# Patient Record
Sex: MALE | Race: WHITE | NOT HISPANIC OR LATINO
[De-identification: names, ages, dates, MRNs, and addresses within clinical notes are randomized per-mention and may not be internally consistent; named-entity substitution may affect disease eponyms.]

---

## 2017-06-13 ENCOUNTER — TRANSCRIPTION ENCOUNTER (OUTPATIENT)
Age: 62
End: 2017-06-13

## 2017-08-19 ENCOUNTER — TRANSCRIPTION ENCOUNTER (OUTPATIENT)
Age: 62
End: 2017-08-19

## 2017-12-27 ENCOUNTER — TRANSCRIPTION ENCOUNTER (OUTPATIENT)
Age: 62
End: 2017-12-27

## 2018-09-03 ENCOUNTER — TRANSCRIPTION ENCOUNTER (OUTPATIENT)
Age: 63
End: 2018-09-03

## 2018-10-21 ENCOUNTER — INPATIENT (INPATIENT)
Facility: HOSPITAL | Age: 63
LOS: 3 days | Discharge: HOME | End: 2018-10-25
Attending: FAMILY MEDICINE | Admitting: FAMILY MEDICINE
Payer: MEDICARE

## 2018-10-21 VITALS
HEIGHT: 66 IN | DIASTOLIC BLOOD PRESSURE: 78 MMHG | WEIGHT: 175.05 LBS | SYSTOLIC BLOOD PRESSURE: 160 MMHG | HEART RATE: 101 BPM | TEMPERATURE: 98 F

## 2018-10-21 DIAGNOSIS — Z90.49 ACQUIRED ABSENCE OF OTHER SPECIFIED PARTS OF DIGESTIVE TRACT: Chronic | ICD-10-CM

## 2018-10-21 DIAGNOSIS — J44.9 CHRONIC OBSTRUCTIVE PULMONARY DISEASE, UNSPECIFIED: ICD-10-CM

## 2018-10-21 DIAGNOSIS — N04.9 NEPHROTIC SYNDROME WITH UNSPECIFIED MORPHOLOGIC CHANGES: ICD-10-CM

## 2018-10-21 DIAGNOSIS — M89.9 DISORDER OF BONE, UNSPECIFIED: ICD-10-CM

## 2018-10-21 DIAGNOSIS — Z98.1 ARTHRODESIS STATUS: Chronic | ICD-10-CM

## 2018-10-21 LAB
ALBUMIN SERPL ELPH-MCNC: 1.5 G/DL — LOW (ref 3.3–5)
ALP SERPL-CCNC: 127 U/L — HIGH (ref 40–120)
ALT FLD-CCNC: 35 U/L — SIGNIFICANT CHANGE UP (ref 12–78)
ANION GAP SERPL CALC-SCNC: 8 MMOL/L — SIGNIFICANT CHANGE UP (ref 5–17)
APTT BLD: 24.7 SEC — LOW (ref 27.5–37.4)
AST SERPL-CCNC: 30 U/L — SIGNIFICANT CHANGE UP (ref 15–37)
BASOPHILS # BLD AUTO: 0.05 K/UL — SIGNIFICANT CHANGE UP (ref 0–0.2)
BASOPHILS NFR BLD AUTO: 0.2 % — SIGNIFICANT CHANGE UP (ref 0–2)
BILIRUB SERPL-MCNC: 0.3 MG/DL — SIGNIFICANT CHANGE UP (ref 0.2–1.2)
BUN SERPL-MCNC: 26 MG/DL — HIGH (ref 7–23)
CALCIUM SERPL-MCNC: 8 MG/DL — LOW (ref 8.5–10.1)
CHLORIDE SERPL-SCNC: 98 MMOL/L — SIGNIFICANT CHANGE UP (ref 96–108)
CO2 SERPL-SCNC: 33 MMOL/L — HIGH (ref 22–31)
CREAT SERPL-MCNC: 1.1 MG/DL — SIGNIFICANT CHANGE UP (ref 0.5–1.3)
EOSINOPHIL # BLD AUTO: 0.01 K/UL — SIGNIFICANT CHANGE UP (ref 0–0.5)
EOSINOPHIL NFR BLD AUTO: 0 % — SIGNIFICANT CHANGE UP (ref 0–6)
ERYTHROCYTE [SEDIMENTATION RATE] IN BLOOD: 14 MM/HR — SIGNIFICANT CHANGE UP (ref 0–20)
GLUCOSE SERPL-MCNC: 257 MG/DL — HIGH (ref 70–99)
HCT VFR BLD CALC: 42.9 % — SIGNIFICANT CHANGE UP (ref 39–50)
HGB BLD-MCNC: 14.1 G/DL — SIGNIFICANT CHANGE UP (ref 13–17)
IMM GRANULOCYTES NFR BLD AUTO: 2 % — HIGH (ref 0–1.5)
INR BLD: 0.8 RATIO — LOW (ref 0.88–1.16)
LYMPHOCYTES # BLD AUTO: 1.29 K/UL — SIGNIFICANT CHANGE UP (ref 1–3.3)
LYMPHOCYTES # BLD AUTO: 6.1 % — LOW (ref 13–44)
MCHC RBC-ENTMCNC: 29 PG — SIGNIFICANT CHANGE UP (ref 27–34)
MCHC RBC-ENTMCNC: 32.9 GM/DL — SIGNIFICANT CHANGE UP (ref 32–36)
MCV RBC AUTO: 88.1 FL — SIGNIFICANT CHANGE UP (ref 80–100)
MONOCYTES # BLD AUTO: 0.38 K/UL — SIGNIFICANT CHANGE UP (ref 0–0.9)
MONOCYTES NFR BLD AUTO: 1.8 % — LOW (ref 2–14)
NEUTROPHILS # BLD AUTO: 19 K/UL — HIGH (ref 1.8–7.4)
NEUTROPHILS NFR BLD AUTO: 89.9 % — HIGH (ref 43–77)
NRBC # BLD: 0 /100 WBCS — SIGNIFICANT CHANGE UP (ref 0–0)
PLATELET # BLD AUTO: 207 K/UL — SIGNIFICANT CHANGE UP (ref 150–400)
POTASSIUM SERPL-MCNC: 4.8 MMOL/L — SIGNIFICANT CHANGE UP (ref 3.5–5.3)
POTASSIUM SERPL-SCNC: 4.8 MMOL/L — SIGNIFICANT CHANGE UP (ref 3.5–5.3)
PROT SERPL-MCNC: 5.3 GM/DL — LOW (ref 6–8.3)
PROTHROM AB SERPL-ACNC: 8.7 SEC — LOW (ref 9.8–12.7)
RBC # BLD: 4.87 M/UL — SIGNIFICANT CHANGE UP (ref 4.2–5.8)
RBC # FLD: 16.4 % — HIGH (ref 10.3–14.5)
SODIUM SERPL-SCNC: 139 MMOL/L — SIGNIFICANT CHANGE UP (ref 135–145)
WBC # BLD: 21.15 K/UL — HIGH (ref 3.8–10.5)
WBC # FLD AUTO: 21.15 K/UL — HIGH (ref 3.8–10.5)

## 2018-10-21 PROCEDURE — 99223 1ST HOSP IP/OBS HIGH 75: CPT

## 2018-10-21 PROCEDURE — 93010 ELECTROCARDIOGRAM REPORT: CPT

## 2018-10-21 PROCEDURE — 74177 CT ABD & PELVIS W/CONTRAST: CPT | Mod: 26

## 2018-10-21 PROCEDURE — 71260 CT THORAX DX C+: CPT | Mod: 26

## 2018-10-21 PROCEDURE — 99283 EMERGENCY DEPT VISIT LOW MDM: CPT

## 2018-10-21 RX ORDER — GLUCAGON INJECTION, SOLUTION 0.5 MG/.1ML
1 INJECTION, SOLUTION SUBCUTANEOUS ONCE
Qty: 0 | Refills: 0 | Status: DISCONTINUED | OUTPATIENT
Start: 2018-10-21 | End: 2018-10-22

## 2018-10-21 RX ORDER — ATORVASTATIN CALCIUM 80 MG/1
40 TABLET, FILM COATED ORAL DAILY
Qty: 0 | Refills: 0 | Status: DISCONTINUED | OUTPATIENT
Start: 2018-10-21 | End: 2018-10-21

## 2018-10-21 RX ORDER — DEXTROSE 50 % IN WATER 50 %
12.5 SYRINGE (ML) INTRAVENOUS ONCE
Qty: 0 | Refills: 0 | Status: DISCONTINUED | OUTPATIENT
Start: 2018-10-21 | End: 2018-10-22

## 2018-10-21 RX ORDER — ZOLPIDEM TARTRATE 10 MG/1
10 TABLET ORAL AT BEDTIME
Qty: 0 | Refills: 0 | Status: DISCONTINUED | OUTPATIENT
Start: 2018-10-21 | End: 2018-10-24

## 2018-10-21 RX ORDER — DEXTROSE 50 % IN WATER 50 %
25 SYRINGE (ML) INTRAVENOUS ONCE
Qty: 0 | Refills: 0 | Status: DISCONTINUED | OUTPATIENT
Start: 2018-10-21 | End: 2018-10-22

## 2018-10-21 RX ORDER — ATORVASTATIN CALCIUM 80 MG/1
40 TABLET, FILM COATED ORAL DAILY
Qty: 0 | Refills: 0 | Status: DISCONTINUED | OUTPATIENT
Start: 2018-10-21 | End: 2018-10-25

## 2018-10-21 RX ORDER — IPRATROPIUM/ALBUTEROL SULFATE 18-103MCG
3 AEROSOL WITH ADAPTER (GRAM) INHALATION EVERY 6 HOURS
Qty: 0 | Refills: 0 | Status: DISCONTINUED | OUTPATIENT
Start: 2018-10-21 | End: 2018-10-25

## 2018-10-21 RX ORDER — LOSARTAN POTASSIUM 100 MG/1
25 TABLET, FILM COATED ORAL DAILY
Qty: 0 | Refills: 0 | Status: DISCONTINUED | OUTPATIENT
Start: 2018-10-21 | End: 2018-10-22

## 2018-10-21 RX ORDER — DEXTROSE 50 % IN WATER 50 %
15 SYRINGE (ML) INTRAVENOUS ONCE
Qty: 0 | Refills: 0 | Status: DISCONTINUED | OUTPATIENT
Start: 2018-10-21 | End: 2018-10-22

## 2018-10-21 RX ORDER — SODIUM CHLORIDE 9 MG/ML
1000 INJECTION, SOLUTION INTRAVENOUS
Qty: 0 | Refills: 0 | Status: DISCONTINUED | OUTPATIENT
Start: 2018-10-21 | End: 2018-10-22

## 2018-10-21 RX ORDER — INSULIN LISPRO 100/ML
VIAL (ML) SUBCUTANEOUS
Qty: 0 | Refills: 0 | Status: DISCONTINUED | OUTPATIENT
Start: 2018-10-21 | End: 2018-10-22

## 2018-10-21 RX ORDER — MAGNESIUM OXIDE 400 MG ORAL TABLET 241.3 MG
400 TABLET ORAL DAILY
Qty: 0 | Refills: 0 | Status: DISCONTINUED | OUTPATIENT
Start: 2018-10-21 | End: 2018-10-25

## 2018-10-21 RX ORDER — SODIUM CHLORIDE 9 MG/ML
1000 INJECTION INTRAMUSCULAR; INTRAVENOUS; SUBCUTANEOUS ONCE
Qty: 0 | Refills: 0 | Status: COMPLETED | OUTPATIENT
Start: 2018-10-21 | End: 2018-10-21

## 2018-10-21 RX ORDER — OXYCODONE HYDROCHLORIDE 5 MG/1
10 TABLET ORAL EVERY 6 HOURS
Qty: 0 | Refills: 0 | Status: DISCONTINUED | OUTPATIENT
Start: 2018-10-21 | End: 2018-10-22

## 2018-10-21 RX ORDER — TAMSULOSIN HYDROCHLORIDE 0.4 MG/1
0.4 CAPSULE ORAL AT BEDTIME
Qty: 0 | Refills: 0 | Status: DISCONTINUED | OUTPATIENT
Start: 2018-10-21 | End: 2018-10-25

## 2018-10-21 RX ORDER — MONTELUKAST 4 MG/1
10 TABLET, CHEWABLE ORAL DAILY
Qty: 0 | Refills: 0 | Status: DISCONTINUED | OUTPATIENT
Start: 2018-10-21 | End: 2018-10-25

## 2018-10-21 RX ORDER — LAMOTRIGINE 25 MG/1
100 TABLET, ORALLY DISINTEGRATING ORAL
Qty: 0 | Refills: 0 | Status: DISCONTINUED | OUTPATIENT
Start: 2018-10-21 | End: 2018-10-25

## 2018-10-21 RX ADMIN — SODIUM CHLORIDE 1000 MILLILITER(S): 9 INJECTION INTRAMUSCULAR; INTRAVENOUS; SUBCUTANEOUS at 18:51

## 2018-10-21 RX ADMIN — OXYCODONE HYDROCHLORIDE 10 MILLIGRAM(S): 5 TABLET ORAL at 18:59

## 2018-10-21 RX ADMIN — LAMOTRIGINE 100 MILLIGRAM(S): 25 TABLET, ORALLY DISINTEGRATING ORAL at 20:18

## 2018-10-21 RX ADMIN — TAMSULOSIN HYDROCHLORIDE 0.4 MILLIGRAM(S): 0.4 CAPSULE ORAL at 22:41

## 2018-10-21 RX ADMIN — OXYCODONE HYDROCHLORIDE 10 MILLIGRAM(S): 5 TABLET ORAL at 20:04

## 2018-10-21 RX ADMIN — ZOLPIDEM TARTRATE 10 MILLIGRAM(S): 10 TABLET ORAL at 23:34

## 2018-10-21 NOTE — ED ADULT NURSE NOTE - OBJECTIVE STATEMENT
patient received, was sent by pmd for a ct scan of his hips to r/o mass vs infection. patient stated had an mri done last week and his md stated unsure if it shows mass or infection to go to the er to do a ct scan with iv contrast, patient was diagnosed with nephrotic syndrome last month, wanted to get blood test done for renal function before ct scan, denies fever/chills, denies sob.

## 2018-10-21 NOTE — H&P ADULT - ASSESSMENT
63M h/o prediabetes, HTN, COPD, nephrotic syndrome on prednisone, a/w suspicious lower back lesion on MRI, r/o infectious process vs malignancy

## 2018-10-21 NOTE — H&P ADULT - PROBLEM SELECTOR PLAN 2
Nephrology consult called; d/w MD who recommended 1L NS prior to IV contrast  Continue home dose of prednisone  Monitor renal function  F/u further recs

## 2018-10-21 NOTE — CONSULT NOTE ADULT - SUBJECTIVE AND OBJECTIVE BOX
HPI:  Patient is a 63 year old male with history of prediabetes, HTN, COPD, nephrotic syndrome on prednisone, chronic back pain requiring spinal fusions x 2 who was sent in by Dr. Rajan to get CT w/ contrast to further workup a suspicious iliac lesion found on MRI. Patient reports, but states that he's had new, worsening right lower back pain that radiates down his right leg for several weeks. Takes oxy 10 at home with temporary relief of pain. Ambulates without difficulty.   Denies fever, chills, chest pain, shortness of breath, abdominal pain. (21 Oct 2018 17:04)      PAST MEDICAL & SURGICAL HISTORY:  COPD (chronic obstructive pulmonary disease)  Nephrotic syndrome  History of cholecystectomy  H/O spinal fusion      REVIEW OF SYSTEMS:    CONSTITUTIONAL: No fever, weight loss, or fatigue  EYES: No eye pain, visual disturbances, or discharge  ENMT:  No difficulty hearing, tinnitus, vertigo; No sinus or throat pain  NECK: No pain or stiffness  BREASTS: No pain, masses, or nipple discharge  RESPIRATORY: No cough, wheezing, chills or hemoptysis; No shortness of breath  CARDIOVASCULAR: No chest pain, palpitations, dizziness, or leg swelling  GASTROINTESTINAL: No abdominal or epigastric pain. No nausea, vomiting, or hematemesis; No diarrhea or constipation. No melena or hematochezia.  GENITOURINARY: No dysuria, frequency, hematuria, or incontinence  NEUROLOGICAL: No headaches, memory loss, loss of strength, numbness, or tremors  SKIN: No itching, burning, rashes, or lesions   LYMPH NODES: No enlarged glands  ENDOCRINE: No heat or cold intolerance; No hair loss  MUSCULOSKELETAL: No joint pain or swelling; No muscle, back, or extremity pain  PSYCHIATRIC: No depression, anxiety, mood swings, or difficulty sleeping  HEME/LYMPH: No easy bruising, or bleeding gums  ALLERGY AND IMMUNOLOGIC: No hives or eczema      MEDICATIONS  (STANDING):  atorvastatin 40 milliGRAM(s) Oral daily  lamoTRIgine 100 milliGRAM(s) Oral two times a day  losartan 25 milliGRAM(s) Oral daily  magnesium oxide 400 milliGRAM(s) Oral daily  montelukast 10 milliGRAM(s) Oral daily  tamsulosin 0.4 milliGRAM(s) Oral at bedtime  torsemide 20 milliGRAM(s) Oral daily    MEDICATIONS  (PRN):  oxyCODONE    IR 10 milliGRAM(s) Oral every 6 hours PRN Severe Pain (7 - 10)      Allergies    penicillin (Unknown)    Intolerances        SOCIAL HISTORY:    FAMILY HISTORY:  No pertinent family history in first degree relatives      Vital Signs Last 24 Hrs  T(C): 36.2 (21 Oct 2018 20:40), Max: 37.1 (21 Oct 2018 13:23)  T(F): 97.1 (21 Oct 2018 20:40), Max: 98.8 (21 Oct 2018 13:23)  HR: 93 (21 Oct 2018 20:40) (70 - 103)  BP: 146/85 (21 Oct 2018 20:40) (120/79 - 160/78)  BP(mean): --  RR: 18 (21 Oct 2018 20:40) (18 - 18)  SpO2: 96% (21 Oct 2018 20:40) (96% - 98%)    PHYSICAL EXAM:    GENERAL: NAD, well-groomed, well-developed  HEAD:  Atraumatic, Normocephalic  EYES: EOMI, PERRLA, conjunctiva and sclera clear  ENMT: No tonsillar erythema, exudates, or enlargement; Moist mucous membranes, Good dentition, No lesions  NECK: Supple, No JVD, Normal thyroid  NERVOUS SYSTEM:  Alert & Oriented X3, Good concentration; Motor Strength 5/5 B/L upper and lower extremities; DTRs 2+ intact and symmetric  CHEST/LUNG: Clear to percussion bilaterally; No rales, rhonchi, wheezing, or rubs  HEART: Regular rate and rhythm; No murmurs, rubs, or gallops  ABDOMEN: Soft, Nontender, Nondistended; Bowel sounds present  EXTREMITIES:  2+ Peripheral Pulses, No clubbing, cyanosis, or edema  LYMPH: No lymphadenopathy notedRECTAL: No masses, prostate normal size and smooth, Guiac negative   BREAST: No palpatble masses, skin no lesions no retractions, no discharages. adenexa no palpable masses noted   GYN: uterus normal size, adenexa no palpable masses, no CMT, no uterine discharge   SKIN: No rashes or lesions      LABS:                        14.1   21.15 )-----------( 207      ( 21 Oct 2018 14:43 )             42.9     10-21    139  |  98  |  26<H>  ----------------------------<  257<H>  4.8   |  33<H>  |  1.10    Ca    8.0<L>      21 Oct 2018 14:43    TPro  5.3<L>  /  Alb  1.5<L>  /  TBili  0.3  /  DBili  x   /  AST  30  /  ALT  35  /  AlkPhos  127<H>  10-21    PT/INR - ( 21 Oct 2018 14:42 )   PT: 8.7 sec;   INR: 0.80 ratio         PTT - ( 21 Oct 2018 14:42 )  PTT:24.7 sec      RADIOLOGY & ADDITIONAL STUDIES: HPI:  Patient is a 63 year old male with history of prediabetes, HTN, COPD, nephrotic syndrome on prednisone 60mg x 1 month, chronic back pain requiring spinal fusions x 2 who was sent in by Dr. Rajan to get CT w/ contrast to further workup a suspicious iliac lesion found on MRI. Patient reports that he's had severe back pain w/ numbness radiating down his right leg x 2months. A week ago he went to Dr. Rajan, who ordered the MRI. When he wanted to follow it up w/ a CT w/ contrast, the pt's nephrologist asked that it be done as inpt under the supervision of a nephrologist. Pt denies fever, chills, chest pain, shortness of breath, abdominal pain.       PAST MEDICAL & SURGICAL HISTORY:  COPD (chronic obstructive pulmonary disease)  Nephrotic syndrome  History of cholecystectomy  H/O spinal fusion      REVIEW OF SYSTEMS:    CONSTITUTIONAL: No fever, weight loss, or fatigue  EYES: No eye pain, visual disturbances, or discharge  ENMT:  No difficulty hearing, tinnitus, vertigo; No sinus or throat pain  NECK: No pain or stiffness  BREASTS: No pain, masses, or nipple discharge  RESPIRATORY: No cough, wheezing, chills or hemoptysis; No shortness of breath  CARDIOVASCULAR: No chest pain, palpitations, dizziness, or leg swelling  GASTROINTESTINAL: No abdominal or epigastric pain. No nausea, vomiting, or hematemesis; No diarrhea or constipation. No melena or hematochezia.  GENITOURINARY: No dysuria, frequency, hematuria, or incontinence  NEUROLOGICAL: No headaches, memory loss, loss of strength, numbness, or tremors  SKIN: No itching, burning, rashes, or lesions   LYMPH NODES: No enlarged glands  ENDOCRINE: No heat or cold intolerance; No hair loss  MUSCULOSKELETAL: lower back pain  PSYCHIATRIC: No depression, anxiety, mood swings, or difficulty sleeping  HEME/LYMPH: No easy bruising, or bleeding gums  ALLERGY AND IMMUNOLOGIC: No hives or eczema      MEDICATIONS  (STANDING):  atorvastatin 40 milliGRAM(s) Oral daily  lamoTRIgine 100 milliGRAM(s) Oral two times a day  losartan 25 milliGRAM(s) Oral daily  magnesium oxide 400 milliGRAM(s) Oral daily  montelukast 10 milliGRAM(s) Oral daily  tamsulosin 0.4 milliGRAM(s) Oral at bedtime  torsemide 20 milliGRAM(s) Oral daily    MEDICATIONS  (PRN):  oxyCODONE    IR 10 milliGRAM(s) Oral every 6 hours PRN Severe Pain (7 - 10)      Allergies    penicillin (Unknown)    Intolerances      SOCIAL HISTORY: smoked for 40 years, but quit    FAMILY HISTORY:  Father had prostate cancer, mother had breast cancer       Vital Signs Last 24 Hrs  T(C): 36.2 (21 Oct 2018 20:40), Max: 37.1 (21 Oct 2018 13:23)  T(F): 97.1 (21 Oct 2018 20:40), Max: 98.8 (21 Oct 2018 13:23)  HR: 93 (21 Oct 2018 20:40) (70 - 103)  BP: 146/85 (21 Oct 2018 20:40) (120/79 - 160/78)  BP(mean): --  RR: 18 (21 Oct 2018 20:40) (18 - 18)  SpO2: 96% (21 Oct 2018 20:40) (96% - 98%)    PHYSICAL EXAM:    GENERAL: NAD, well-groomed, obese  HEAD:  Atraumatic, Normocephalic  EYES: EOMI, PERRLA   NECK: Supple   NERVOUS SYSTEM:  Alert & Oriented X3, Good concentration   CHEST/LUNG: Clear to percussion bilaterally; No rales, rhonchi, wheezing, or rubs  HEART: Regular rate and rhythm; No murmurs, rubs, or gallops  ABDOMEN: Soft, Nontender, Nondistended; Bowel sounds present  EXTREMITIES: B/L LE edema       LABS:                        14.1   21.15 )-----------( 207      ( 21 Oct 2018 14:43 )             42.9     10-21    139  |  98  |  26<H>  ----------------------------<  257<H>  4.8   |  33<H>  |  1.10    Ca    8.0<L>      21 Oct 2018 14:43    TPro  5.3<L>  /  Alb  1.5<L>  /  TBili  0.3  /  DBili  x   /  AST  30  /  ALT  35  /  AlkPhos  127<H>  10-21    PT/INR - ( 21 Oct 2018 14:42 )   PT: 8.7 sec;   INR: 0.80 ratio         PTT - ( 21 Oct 2018 14:42 )  PTT:24.7 sec      RADIOLOGY & ADDITIONAL STUDIES:

## 2018-10-21 NOTE — ED PROVIDER NOTE - CONSTITUTIONAL, MLM
normal... Well appearing, well nourished, awake, alert, oriented to person, place, time/situation and in no apparent distress. Speaking in clear full sentences smiling pleasant appears very comfortable sitting up in the stretcher in a bright light room.

## 2018-10-21 NOTE — ED ADULT NURSE NOTE - NSIMPLEMENTINTERV_GEN_ALL_ED
Implemented All Universal Safety Interventions:  [place] to call system. Call bell, personal items and telephone within reach. Instruct patient to call for assistance. Room bathroom lighting operational. Non-slip footwear when patient is off stretcher. Physically safe environment: no spills, clutter or unnecessary equipment. Stretcher in lowest position, wheels locked, appropriate side rails in place.

## 2018-10-21 NOTE — ED ADULT NURSE REASSESSMENT NOTE - NS ED NURSE REASSESS COMMENT FT1
no change pt waiting for placement, aware due to high acuity placement is delayed Alexandra aware of delays

## 2018-10-21 NOTE — ED PROVIDER NOTE - PROGRESS NOTE DETAILS
Dr. Benitez 's associated called here and sts get ct iv chest/abd/pelvis and admit pt to Dr. Benitez service.

## 2018-10-21 NOTE — CONSULT NOTE ADULT - ASSESSMENT
63 year old male with history of prediabetes, HTN, COPD, nephrotic syndrome on prednisone 60mg x 1 month, chronic back pain requiring spinal fusions x 2 who presented for work up of an iliac lesion.   Iliac lesion  - CT showed a gastric antrum thickening reflect gastritis or gastric neoplasm w/ a lytic lesion of the left iliac wing that is a metastatic deposit  - recommend onc & GI consults to evaluate for gastric malignancy  - checking CRP, ESR & procalcitonin but infectious cause of lesion is unlikely   - recommend Lovenox prophylaxis for DVT in this patient w/ likely malignancy     Nephrotic syndrome  - patient was on Prednisone 60mg x 1 month, but now is adamant that he wants to be weaned off  - patient will need to be weaned off very slowly  - follow up w/ renal about starting new meds to replace prednisone  - c/w torsemide for LE edema that's likely due to Prednisone   - stop diuretics if patient develops ROSI     Prediabetes  - check Hgb A1C  - add sliding scale insulin    HLD  - c/w Atorvastatin    BPH  - c/w Flomax    HTN  - c/w losartan    COPD  - c/w Singulair  - add PRN DuoNeb    Insomnia  - c/w Ambien

## 2018-10-21 NOTE — H&P ADULT - PROBLEM SELECTOR PLAN 1
Admit  STAT CT c IV contrast  Medical consult called  ID consult called; awaiting abx recs  F/u labs; ESR, CRP, blood cultures  Pain management PRN  Ambulate as tolerated  DVT ppx c SCDs for now  Discussed with Dr. Rajan

## 2018-10-21 NOTE — ED PROVIDER NOTE - OBJECTIVE STATEMENT
63 years old male walked in with daughter sts he was sent here by Dr. Emmanuel avelar of ct of pelvic because suspicious of mass. Pt also sts he has a hx of copd 63 years old male walked in with daughter sts he was sent here by Dr. Emmanuel avelar of ct of pelvic because suspicious of mass. Pt also sts he has a hx of copd has been taking prednisone daily. Pt denies headache, recent trauma, dizziness, blurred visions, light sensitivities, focal/distal weakness or numbness, cough, sob, chest pain, nausea, vomiting, fever, chills, abd pain, back pain, dysuria or irregular bowel movements.

## 2018-10-21 NOTE — H&P ADULT - HISTORY OF PRESENT ILLNESS
Patient is a 63 year old male with PMH prediabetes, HTN, COPD, nephrotic syndrome on prednisone, who was sent in by Dr. Rajan to further workup a suspicious lesion found on MRI. Patient reports chronic back pain requiring spinal fusions x 2, but states that he's had worsening right lower back pain that radiates down his right leg for several weeks. Takes oxy 10 at home with temporary relief of pain. Ambulates without difficulty.   Denies fever, chills, chest pain, shortness of breath, abdominal pain. Patient is a 63 year old male with PMH prediabetes, HTN, COPD, nephrotic syndrome on prednisone, who was sent in by Dr. Rajan to further workup a suspicious iliac lesion found on MRI. Patient reports chronic back pain requiring spinal fusions x 2, but states that he's had new, worsening right lower back pain that radiates down his right leg for several weeks. Takes oxy 10 at home with temporary relief of pain. Ambulates without difficulty.   Denies fever, chills, chest pain, shortness of breath, abdominal pain. Patient is a 63 year old male with PMH prediabetes, HTN, COPD, nephrotic syndrome on prednisone, who was sent in by Dr. Rajan to further workup a suspicious iliac lesion found on MRI. Patient reports chronic back pain requiring spinal fusions x 2, but states that he's had new, worsening left lower back pain that radiates down his leg for several weeks. Takes oxy 10 at home with temporary relief of pain. Ambulates without difficulty.   Denies fever, chills, chest pain, shortness of breath, abdominal pain. Patient is a 63 year old male with PMH prediabetes, HTN, COPD, nephrotic syndrome on prednisone, who was sent in by Dr. Rajan to further workup a suspicious iliac lesion found on MRI. Patient reports chronic back pain requiring spinal fusions x 2, but states that he's had new, worsening right lower back pain that radiates down his leg for several weeks. Takes oxy 10 at home with temporary relief of pain. Ambulates without difficulty.   Denies fever, chills, chest pain, shortness of breath, abdominal pain.

## 2018-10-22 DIAGNOSIS — J43.2 CENTRILOBULAR EMPHYSEMA: ICD-10-CM

## 2018-10-22 DIAGNOSIS — M54.5 LOW BACK PAIN: ICD-10-CM

## 2018-10-22 DIAGNOSIS — N28.0 ISCHEMIA AND INFARCTION OF KIDNEY: ICD-10-CM

## 2018-10-22 DIAGNOSIS — K29.70 GASTRITIS, UNSPECIFIED, WITHOUT BLEEDING: ICD-10-CM

## 2018-10-22 DIAGNOSIS — D49.0 NEOPLASM OF UNSPECIFIED BEHAVIOR OF DIGESTIVE SYSTEM: ICD-10-CM

## 2018-10-22 DIAGNOSIS — I26.99 OTHER PULMONARY EMBOLISM WITHOUT ACUTE COR PULMONALE: ICD-10-CM

## 2018-10-22 LAB
ALBUMIN SERPL ELPH-MCNC: 1.2 G/DL — LOW (ref 3.3–5)
ALP SERPL-CCNC: 105 U/L — SIGNIFICANT CHANGE UP (ref 40–120)
ALT FLD-CCNC: 30 U/L — SIGNIFICANT CHANGE UP (ref 12–78)
ANION GAP SERPL CALC-SCNC: 8 MMOL/L — SIGNIFICANT CHANGE UP (ref 5–17)
APPEARANCE UR: CLEAR — SIGNIFICANT CHANGE UP
AST SERPL-CCNC: 28 U/L — SIGNIFICANT CHANGE UP (ref 15–37)
BACTERIA # UR AUTO: ABNORMAL
BILIRUB SERPL-MCNC: 0.2 MG/DL — SIGNIFICANT CHANGE UP (ref 0.2–1.2)
BILIRUB UR-MCNC: NEGATIVE — SIGNIFICANT CHANGE UP
BUN SERPL-MCNC: 29 MG/DL — HIGH (ref 7–23)
CALCIUM SERPL-MCNC: 8 MG/DL — LOW (ref 8.5–10.1)
CEA SERPL-MCNC: 398.1 NG/ML — HIGH (ref 0–3.8)
CHLORIDE SERPL-SCNC: 100 MMOL/L — SIGNIFICANT CHANGE UP (ref 96–108)
CO2 SERPL-SCNC: 31 MMOL/L — SIGNIFICANT CHANGE UP (ref 22–31)
COLOR SPEC: YELLOW — SIGNIFICANT CHANGE UP
CREAT SERPL-MCNC: 1.09 MG/DL — SIGNIFICANT CHANGE UP (ref 0.5–1.3)
CRP SERPL-MCNC: 1.33 MG/DL — HIGH (ref 0–0.4)
DIFF PNL FLD: ABNORMAL
EPI CELLS # UR: SIGNIFICANT CHANGE UP
GLUCOSE SERPL-MCNC: 148 MG/DL — HIGH (ref 70–99)
GLUCOSE UR QL: 250 MG/DL
HBA1C BLD-MCNC: 7.3 % — HIGH (ref 4–5.6)
HCT VFR BLD CALC: 39.6 % — SIGNIFICANT CHANGE UP (ref 39–50)
HGB BLD-MCNC: 12.8 G/DL — LOW (ref 13–17)
HYALINE CASTS # UR AUTO: ABNORMAL /LPF
KETONES UR-MCNC: NEGATIVE — SIGNIFICANT CHANGE UP
LEUKOCYTE ESTERASE UR-ACNC: NEGATIVE — SIGNIFICANT CHANGE UP
MAGNESIUM SERPL-MCNC: 2.3 MG/DL — SIGNIFICANT CHANGE UP (ref 1.6–2.6)
MCHC RBC-ENTMCNC: 28.5 PG — SIGNIFICANT CHANGE UP (ref 27–34)
MCHC RBC-ENTMCNC: 32.3 GM/DL — SIGNIFICANT CHANGE UP (ref 32–36)
MCV RBC AUTO: 88.2 FL — SIGNIFICANT CHANGE UP (ref 80–100)
NITRITE UR-MCNC: NEGATIVE — SIGNIFICANT CHANGE UP
NRBC # BLD: 0 /100 WBCS — SIGNIFICANT CHANGE UP (ref 0–0)
PH UR: 8 — SIGNIFICANT CHANGE UP (ref 5–8)
PHOSPHATE SERPL-MCNC: 3.3 MG/DL — SIGNIFICANT CHANGE UP (ref 2.5–4.5)
PLATELET # BLD AUTO: 172 K/UL — SIGNIFICANT CHANGE UP (ref 150–400)
POTASSIUM SERPL-MCNC: 4.2 MMOL/L — SIGNIFICANT CHANGE UP (ref 3.5–5.3)
POTASSIUM SERPL-SCNC: 4.2 MMOL/L — SIGNIFICANT CHANGE UP (ref 3.5–5.3)
PROCALCITONIN SERPL-MCNC: 0.12 NG/ML — HIGH (ref 0.02–0.1)
PROT SERPL-MCNC: 4.3 GM/DL — LOW (ref 6–8.3)
PROT UR-MCNC: 500 MG/DL
RBC # BLD: 4.49 M/UL — SIGNIFICANT CHANGE UP (ref 4.2–5.8)
RBC # FLD: 16.5 % — HIGH (ref 10.3–14.5)
RBC CASTS # UR COMP ASSIST: SIGNIFICANT CHANGE UP /HPF (ref 0–4)
SODIUM SERPL-SCNC: 139 MMOL/L — SIGNIFICANT CHANGE UP (ref 135–145)
SP GR SPEC: 1.01 — SIGNIFICANT CHANGE UP (ref 1.01–1.02)
UROBILINOGEN FLD QL: NEGATIVE MG/DL — SIGNIFICANT CHANGE UP
WBC # BLD: 15.84 K/UL — HIGH (ref 3.8–10.5)
WBC # FLD AUTO: 15.84 K/UL — HIGH (ref 3.8–10.5)
WBC UR QL: SIGNIFICANT CHANGE UP

## 2018-10-22 PROCEDURE — 93970 EXTREMITY STUDY: CPT | Mod: 26

## 2018-10-22 PROCEDURE — 99233 SBSQ HOSP IP/OBS HIGH 50: CPT

## 2018-10-22 RX ORDER — DEXTROSE 50 % IN WATER 50 %
15 SYRINGE (ML) INTRAVENOUS ONCE
Qty: 0 | Refills: 0 | Status: DISCONTINUED | OUTPATIENT
Start: 2018-10-22 | End: 2018-10-25

## 2018-10-22 RX ORDER — INSULIN LISPRO 100/ML
VIAL (ML) SUBCUTANEOUS AT BEDTIME
Qty: 0 | Refills: 0 | Status: DISCONTINUED | OUTPATIENT
Start: 2018-10-22 | End: 2018-10-25

## 2018-10-22 RX ORDER — ENOXAPARIN SODIUM 100 MG/ML
80 INJECTION SUBCUTANEOUS EVERY 24 HOURS
Qty: 0 | Refills: 0 | Status: DISCONTINUED | OUTPATIENT
Start: 2018-10-22 | End: 2018-10-22

## 2018-10-22 RX ORDER — INSULIN LISPRO 100/ML
VIAL (ML) SUBCUTANEOUS
Qty: 0 | Refills: 0 | Status: DISCONTINUED | OUTPATIENT
Start: 2018-10-22 | End: 2018-10-25

## 2018-10-22 RX ORDER — DEXTROSE 50 % IN WATER 50 %
25 SYRINGE (ML) INTRAVENOUS ONCE
Qty: 0 | Refills: 0 | Status: DISCONTINUED | OUTPATIENT
Start: 2018-10-22 | End: 2018-10-25

## 2018-10-22 RX ORDER — SODIUM CHLORIDE 9 MG/ML
1000 INJECTION, SOLUTION INTRAVENOUS
Qty: 0 | Refills: 0 | Status: DISCONTINUED | OUTPATIENT
Start: 2018-10-22 | End: 2018-10-25

## 2018-10-22 RX ORDER — HEPARIN SODIUM 5000 [USP'U]/ML
3000 INJECTION INTRAVENOUS; SUBCUTANEOUS EVERY 6 HOURS
Qty: 0 | Refills: 0 | Status: DISCONTINUED | OUTPATIENT
Start: 2018-10-22 | End: 2018-10-24

## 2018-10-22 RX ORDER — GLUCAGON INJECTION, SOLUTION 0.5 MG/.1ML
1 INJECTION, SOLUTION SUBCUTANEOUS ONCE
Qty: 0 | Refills: 0 | Status: DISCONTINUED | OUTPATIENT
Start: 2018-10-22 | End: 2018-10-25

## 2018-10-22 RX ORDER — HEPARIN SODIUM 5000 [USP'U]/ML
INJECTION INTRAVENOUS; SUBCUTANEOUS
Qty: 25000 | Refills: 0 | Status: DISCONTINUED | OUTPATIENT
Start: 2018-10-22 | End: 2018-10-24

## 2018-10-22 RX ORDER — DEXTROSE 50 % IN WATER 50 %
12.5 SYRINGE (ML) INTRAVENOUS ONCE
Qty: 0 | Refills: 0 | Status: DISCONTINUED | OUTPATIENT
Start: 2018-10-22 | End: 2018-10-25

## 2018-10-22 RX ORDER — DIAZEPAM 5 MG
5 TABLET ORAL EVERY 8 HOURS
Qty: 0 | Refills: 0 | Status: DISCONTINUED | OUTPATIENT
Start: 2018-10-22 | End: 2018-10-23

## 2018-10-22 RX ORDER — HEPARIN SODIUM 5000 [USP'U]/ML
6500 INJECTION INTRAVENOUS; SUBCUTANEOUS EVERY 6 HOURS
Qty: 0 | Refills: 0 | Status: DISCONTINUED | OUTPATIENT
Start: 2018-10-22 | End: 2018-10-24

## 2018-10-22 RX ORDER — OXYCODONE HYDROCHLORIDE 5 MG/1
10 TABLET ORAL EVERY 4 HOURS
Qty: 0 | Refills: 0 | Status: DISCONTINUED | OUTPATIENT
Start: 2018-10-22 | End: 2018-10-25

## 2018-10-22 RX ORDER — ENOXAPARIN SODIUM 100 MG/ML
80 INJECTION SUBCUTANEOUS EVERY 12 HOURS
Qty: 0 | Refills: 0 | Status: DISCONTINUED | OUTPATIENT
Start: 2018-10-22 | End: 2018-10-22

## 2018-10-22 RX ORDER — LOSARTAN POTASSIUM 100 MG/1
50 TABLET, FILM COATED ORAL DAILY
Qty: 0 | Refills: 0 | Status: DISCONTINUED | OUTPATIENT
Start: 2018-10-22 | End: 2018-10-25

## 2018-10-22 RX ADMIN — LAMOTRIGINE 100 MILLIGRAM(S): 25 TABLET, ORALLY DISINTEGRATING ORAL at 17:25

## 2018-10-22 RX ADMIN — TAMSULOSIN HYDROCHLORIDE 0.4 MILLIGRAM(S): 0.4 CAPSULE ORAL at 22:17

## 2018-10-22 RX ADMIN — ENOXAPARIN SODIUM 80 MILLIGRAM(S): 100 INJECTION SUBCUTANEOUS at 11:41

## 2018-10-22 RX ADMIN — LAMOTRIGINE 100 MILLIGRAM(S): 25 TABLET, ORALLY DISINTEGRATING ORAL at 06:15

## 2018-10-22 RX ADMIN — OXYCODONE HYDROCHLORIDE 10 MILLIGRAM(S): 5 TABLET ORAL at 08:01

## 2018-10-22 RX ADMIN — OXYCODONE HYDROCHLORIDE 10 MILLIGRAM(S): 5 TABLET ORAL at 07:05

## 2018-10-22 RX ADMIN — LOSARTAN POTASSIUM 25 MILLIGRAM(S): 100 TABLET, FILM COATED ORAL at 06:16

## 2018-10-22 RX ADMIN — MAGNESIUM OXIDE 400 MG ORAL TABLET 400 MILLIGRAM(S): 241.3 TABLET ORAL at 11:42

## 2018-10-22 RX ADMIN — OXYCODONE HYDROCHLORIDE 10 MILLIGRAM(S): 5 TABLET ORAL at 01:50

## 2018-10-22 RX ADMIN — Medication 5 MILLIGRAM(S): at 09:52

## 2018-10-22 RX ADMIN — HEPARIN SODIUM 1500 UNIT(S)/HR: 5000 INJECTION INTRAVENOUS; SUBCUTANEOUS at 21:17

## 2018-10-22 RX ADMIN — Medication 4: at 17:02

## 2018-10-22 RX ADMIN — OXYCODONE HYDROCHLORIDE 10 MILLIGRAM(S): 5 TABLET ORAL at 00:54

## 2018-10-22 RX ADMIN — OXYCODONE HYDROCHLORIDE 10 MILLIGRAM(S): 5 TABLET ORAL at 18:05

## 2018-10-22 RX ADMIN — OXYCODONE HYDROCHLORIDE 10 MILLIGRAM(S): 5 TABLET ORAL at 17:25

## 2018-10-22 RX ADMIN — Medication 20 MILLIGRAM(S): at 06:16

## 2018-10-22 RX ADMIN — OXYCODONE HYDROCHLORIDE 10 MILLIGRAM(S): 5 TABLET ORAL at 22:16

## 2018-10-22 RX ADMIN — Medication 2: at 11:41

## 2018-10-22 RX ADMIN — MONTELUKAST 10 MILLIGRAM(S): 4 TABLET, CHEWABLE ORAL at 11:42

## 2018-10-22 RX ADMIN — Medication 50 MILLIGRAM(S): at 06:16

## 2018-10-22 RX ADMIN — ATORVASTATIN CALCIUM 40 MILLIGRAM(S): 80 TABLET, FILM COATED ORAL at 22:17

## 2018-10-22 RX ADMIN — ZOLPIDEM TARTRATE 10 MILLIGRAM(S): 10 TABLET ORAL at 23:47

## 2018-10-22 RX ADMIN — OXYCODONE HYDROCHLORIDE 10 MILLIGRAM(S): 5 TABLET ORAL at 23:10

## 2018-10-22 NOTE — PROGRESS NOTE ADULT - SUBJECTIVE AND OBJECTIVE BOX
Pt complaining of back pain. Anxious. Spoke with dr rajan earlier this am and has many questions about diagnosis and prognosis.     Reviewed CT scan results and plan as follows:  Oncology consult pending Dr. Klaudia Tolbert. Office called for stat consult today for newly diagnosed metastatic disease.  GI consult: Dr. Bragg, already seen. Reviewed CT scan results. All findings on CT benign processes at present and no suspicion of GI involvement of disease at this time.  Nephrology: pt with nephrotic syndrome. Sees Dr. Marcelo Senior [phone]. This provider does not have privileges here and was called this am. This is her day off and she will be back in the office tomorrow. Called to Dr. Galvan for consult today to coordinate care with all the providers.  Pulmonary: CT scan with left pleural effusion. Per pt he has had pleural effusions in the past which were going to be drained. The procedure was never done because the fluid resolved on follow up testing. He sees Dr. Wiggins [phone] for COPD and pleural effusion. He was scheduled for lung cancer screening but had deferred testing. He had reported a 20lb weight loss but this was intentional through diet and exercise as per discussion today with Dr. Wiggins. Asked for consult here from Dr. Antione Romero as pt with hilar adenopathy, lytic lesion on right 9th rib and distal pulmonary emboli.    Medical follow up pending from hospitalist.   Pt with anxiety: Valium 5mg ordered  Incentive spirometer reviewed and encouraged  Dr. Rajan spoke with pt earlier via telephone and is closely involved in his care. Pt complaining of back pain. Anxious. Spoke with dr rajan earlier this am and has many questions about diagnosis and prognosis.     Reviewed CT scan results and plan as follows:  Oncology consult pending Dr. Klaudia Tolbert. Office called for stat consult today for newly diagnosed metastatic disease.  GI consult: Dr. Bragg, already seen. Reviewed CT scan results. All findings on CT benign processes at present and no suspicion of GI involvement of disease at this time.  Nephrology: pt with nephrotic syndrome. Sees Dr. Marcelo Senior [phone]. This provider does not have privileges here and was called this am. This is her day off and she will be back in the office tomorrow. Called to Dr. Galvan for consult today to coordinate care with all the providers.  Pulmonary: CT scan with left pleural effusion. Per pt he has had pleural effusions in the past which were going to be drained. The procedure was never done because the fluid resolved on follow up testing. He sees Dr. Wiggins [phone] for COPD and pleural effusion. He was scheduled for lung cancer screening but had deferred testing. He had reported a 20lb weight loss but this was intentional through diet and exercise as per discussion today with Dr. Wiggins. Asked for consult here from Dr. Antione Romero as pt with hilar adenopathy, lytic lesion on right 9th rib and distal pulmonary emboli.    Medical follow up pending from hospitalist.   Pt with anxiety: Valium 5mg ordered  Incentive spirometer reviewed and encouraged  Dr. Rajan spoke with pt earlier via telephone and is closely involved in his care.   Also spoke with pt brother-in-law per pt request Dr. Delacruz (Ob/GYn [phone]

## 2018-10-22 NOTE — PROGRESS NOTE ADULT - ASSESSMENT
Patient is a 63 year old male with PMH prediabetes, HTN, COPD, nephrotic syndrome on prednisone that is admitted for abnormal MRI

## 2018-10-22 NOTE — CONSULT NOTE ADULT - SUBJECTIVE AND OBJECTIVE BOX
Information from chart:  "Patient is a 63y old  Male who presents with a chief complaint of Suspicious pelvic lesion on outpatient MRI (22 Oct 2018 10:16)    HPI:  Patient is a 63 year old male with PMH prediabetes, HTN, COPD, nephrotic syndrome on prednisone, who was sent in by Dr. Rajan to further workup a suspicious iliac lesion found on MRI. Patient reports chronic back pain requiring spinal fusions x 2, but states that he's had new, worsening right lower back pain that radiates down his leg for several weeks. Takes oxy 10 at home with temporary relief of pain. Ambulates without difficulty.   Denies fever, chills, chest pain, shortness of breath, abdominal pain. (21 Oct 2018 17:04)   "  Hx FSGS dx via renal bx in 2018, Dr. Senior; initiated Prednisone 60 mg daily.  Now with suspicious lesion on iliac hip.  Patient with increasing edema and anasarca;       PAST MEDICAL & SURGICAL HISTORY:  COPD (chronic obstructive pulmonary disease)  Nephrotic syndrome  History of cholecystectomy  H/O spinal fusion    FAMILY HISTORY:  No pertinent family history in first degree relatives    Allergies    penicillin (Unknown)    Intolerances      Home Medications:  Ambien 10 mg oral tablet: 1 tab(s) orally once a day (at bedtime), As Needed for insomnia (21 Oct 2018 22:01)  Cozaar 25 mg oral tablet: 1 tab(s) orally once a day (21 Oct 2018 15:12)  Demadex 20 mg oral tablet: 1 tab(s) orally once a day (21 Oct 2018 15:12)  Flomax 0.4 mg oral capsule: 1 cap(s) orally once a day (21 Oct 2018 15:12)  LaMICtal 100 mg oral tablet: 1 tab(s) orally 2 times a day (21 Oct 2018 15:12)  Lipitor 40 mg oral tablet: 1 tab(s) orally once a day (21 Oct 2018 15:12)  magnesium oxide 400 mg (241.3 mg elemental magnesium) oral tablet: 1 tab(s) orally once a day (21 Oct 2018 15:12)  predniSONE: 60 milligram(s) orally once a day (21 Oct 2018 15:12)  Singulair 10 mg oral tablet: 1 tab(s) orally once a day (21 Oct 2018 15:12)    MEDICATIONS  (STANDING):  atorvastatin 40 milliGRAM(s) Oral daily  dextrose 5%. 1000 milliLiter(s) (50 mL/Hr) IV Continuous <Continuous>  dextrose 50% Injectable 12.5 Gram(s) IV Push once  dextrose 50% Injectable 25 Gram(s) IV Push once  dextrose 50% Injectable 25 Gram(s) IV Push once  enoxaparin Injectable 80 milliGRAM(s) SubCutaneous every 12 hours  insulin lispro (HumaLOG) corrective regimen sliding scale   SubCutaneous three times a day before meals  insulin lispro (HumaLOG) corrective regimen sliding scale   SubCutaneous at bedtime  lamoTRIgine 100 milliGRAM(s) Oral two times a day  losartan 25 milliGRAM(s) Oral daily  magnesium oxide 400 milliGRAM(s) Oral daily  montelukast 10 milliGRAM(s) Oral daily  predniSONE   Tablet 50 milliGRAM(s) Oral daily  tamsulosin 0.4 milliGRAM(s) Oral at bedtime  torsemide 20 milliGRAM(s) Oral daily    MEDICATIONS  (PRN):  ALBUTerol/ipratropium for Nebulization 3 milliLiter(s) Nebulizer every 6 hours PRN Shortness of Breath and/or Wheezing  dextrose 40% Gel 15 Gram(s) Oral once PRN Blood Glucose LESS THAN 70 milliGRAM(s)/deciliter  diazepam    Tablet 5 milliGRAM(s) Oral every 8 hours PRN muscle spasm/anxiety  glucagon  Injectable 1 milliGRAM(s) IntraMuscular once PRN Glucose LESS THAN 70 milligrams/deciliter  oxyCODONE    IR 10 milliGRAM(s) Oral every 4 hours PRN Severe Pain (7 - 10)  zolpidem 10 milliGRAM(s) Oral at bedtime PRN Insomnia    Vital Signs Last 24 Hrs  T(C): 36.8 (22 Oct 2018 11:23), Max: 36.8 (21 Oct 2018 19:06)  T(F): 98.2 (22 Oct 2018 11:23), Max: 98.3 (21 Oct 2018 19:06)  HR: 94 (22 Oct 2018 11:23) (89 - 103)  BP: 118/78 (22 Oct 2018 11:23) (118/78 - 146/85)  BP(mean): --  RR: 17 (22 Oct 2018 11:23) (17 - 18)  SpO2: 98% (22 Oct 2018 11:23) (96% - 98%)    Daily Height in cm: 167.64 (21 Oct 2018 20:40)    Daily Weight in k (22 Oct 2018 07:50)    10-22-18 @ 07:01  -  10-22-18 @ 15:57  --------------------------------------------------------  IN: 240 mL / OUT: 0 mL / NET: 240 mL      CAPILLARY BLOOD GLUCOSE      POCT Blood Glucose.: 239 mg/dL (22 Oct 2018 11:31)  POCT Blood Glucose.: 144 mg/dL (22 Oct 2018 07:44)  POCT Blood Glucose.: 250 mg/dL (21 Oct 2018 22:46)    PHYSICAL EXAM:      T(C): 36.8 (10-22-18 @ 11:23), Max: 36.8 (10-21-18 @ 19:06)  HR: 94 (10-22-18 @ 11:23) (89 - 103)  BP: 118/78 (10-22-18 @ 11:23) (118/78 - 146/85)  RR: 17 (10-22-18 @ 11:23) (17 - 18)  SpO2: 98% (10-22-18 @ 11:23) (96% - 98%)  Wt(kg): --  Respiratory: clear anteriorly, decreased BS at bases  Cardiovascular: S1 S2  Gastrointestinal: soft NT ND +BS  Extremities: 2-3  edema              10-22    139  |  100  |  29<H>  ----------------------------<  148<H>  4.2   |  31  |  1.09    Ca    8.0<L>      22 Oct 2018 06:21  Phos  3.3     10-22  Mg     2.3     10-22    TPro  4.3<L>  /  Alb  1.2<L>  /  TBili  0.2  /  DBili  x   /  AST  28  /  ALT  30  /  AlkPhos  105  10-22                          12.8   15.84 )-----------( 172      ( 22 Oct 2018 06:20 )             39.6       Assessment and Plan    Focal segmental nephrosclerosis, TIP variant, 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  Will follow.

## 2018-10-22 NOTE — CHART NOTE - NSCHARTNOTEFT_GEN_A_CORE
spoke to CO[place] downs  appears more tumour than infection   she is going to cancel the consult   please call if any further issues   thx

## 2018-10-22 NOTE — PROGRESS NOTE ADULT - PROBLEM/PLAN-7
May 9, 2018     Valentino Kessler, DO  [address] [place] [address]    Patient: Zaynab Hutchinson   YOB: 1957   Date of Visit: 5/9/2018       Dear Dr Edmundo Lopez: Thank you for referring Zaynab Hutchinson to me for evaluation  Below are my notes for this consultation  If you have questions, please do not hesitate to call me  I look forward to following your patient along with you           Sincerely,        Floyd Das MD        CC: No Recipients DISPLAY PLAN FREE TEXT

## 2018-10-22 NOTE — PROGRESS NOTE ADULT - SUBJECTIVE AND OBJECTIVE BOX
Patient is a 63y old  Male who presents with a chief complaint of Suspicious pelvic lesion on outpatient MRI (22 Oct 2018 16:25)        HPI:  Patient is a 63 year old male with PMH prediabetes, HTN, COPD, nephrotic syndrome on prednisone, who was sent in by Dr. Rjaan to further workup a suspicious iliac lesion found on MRI. Patient reports chronic back pain requiring spinal fusions x 2, but states that he's had new, worsening right lower back pain that radiates down his leg for several weeks. Takes oxy 10 at home with temporary relief of pain. Ambulates without difficulty.   Denies fever, chills, chest pain, shortness of breath, abdominal pain. (21 Oct 2018 17:04)      SUBJECTIVE & OBJECTIVE: Pt seen and examined at bedside. He has some mild complaint of lower back pain at the site of his previous spinal surgery.  No complaints of shortness of breath, cough, dizziness or other symptom.      PHYSICAL EXAM:  T(C): 36.8 (10-22-18 @ 11:23), Max: 36.8 (10-21-18 @ 19:06)  HR: 94 (10-22-18 @ 11:23) (89 - 103)  BP: 118/78 (10-22-18 @ 11:23) (118/78 - 146/85)  RR: 17 (10-22-18 @ 11:23) (17 - 18)  SpO2: 98% (10-22-18 @ 11:23) (96% - 98%)  Wt(kg): -- Height (cm): 167.64 (10-21 @ 20:40)  Weight (kg): 83.4 (10-21 @ 20:40)  BMI (kg/m2): 29.7 (10-21 @ 20:40)  BSA (m2): 1.93 (10-21 @ 20:40)  I&O's Detail    22 Oct 2018 07:01  -  22 Oct 2018 17:52  --------------------------------------------------------  IN:    Oral Fluid: 240 mL  Total IN: 240 mL    OUT:  Total OUT: 0 mL  Total NET: 240 mL  GENERAL: NAD, well-groomed, well-developed  HEAD:  Atraumatic, Normocephalic  EYES: EOMI, PERRLA, conjunctiva and sclera clear  ENMT: Moist mucous membranes  NECK: Supple, No JVD  NERVOUS SYSTEM:  Alert & Oriented X3, Motor Strength 5/5 B/L upper and lower extremities; DTRs 2+ intact and symmetric  CHEST/LUNG: Clear to auscultation bilaterally; No rales, rhonchi, wheezing, or rubs  HEART: Regular rate and rhythm; No murmurs, rubs, or gallops  ABDOMEN: Soft, Nontender, Nondistended; Bowel sounds present  EXTREMITIES:  +3 bilateral soft pitting edema to the bilateral knees.     MEDICATIONS  (STANDING):  atorvastatin 40 milliGRAM(s) Oral daily  dextrose 5%. 1000 milliLiter(s) (50 mL/Hr) IV Continuous <Continuous>  dextrose 50% Injectable 12.5 Gram(s) IV Push once  dextrose 50% Injectable 25 Gram(s) IV Push once  dextrose 50% Injectable 25 Gram(s) IV Push once  heparin  Infusion.  Unit(s)/Hr (15 mL/Hr) IV Continuous <Continuous>  insulin lispro (HumaLOG) corrective regimen sliding scale   SubCutaneous three times a day before meals  insulin lispro (HumaLOG) corrective regimen sliding scale   SubCutaneous at bedtime  lamoTRIgine 100 milliGRAM(s) Oral two times a day  losartan 25 milliGRAM(s) Oral daily  magnesium oxide 400 milliGRAM(s) Oral daily  montelukast 10 milliGRAM(s) Oral daily  predniSONE   Tablet 50 milliGRAM(s) Oral daily  tamsulosin 0.4 milliGRAM(s) Oral at bedtime    MEDICATIONS  (PRN):  ALBUTerol/ipratropium for Nebulization 3 milliLiter(s) Nebulizer every 6 hours PRN Shortness of Breath and/or Wheezing  dextrose 40% Gel 15 Gram(s) Oral once PRN Blood Glucose LESS THAN 70 milliGRAM(s)/deciliter  diazepam    Tablet 5 milliGRAM(s) Oral every 8 hours PRN muscle spasm/anxiety  glucagon  Injectable 1 milliGRAM(s) IntraMuscular once PRN Glucose LESS THAN 70 milligrams/deciliter  heparin  Injectable 6500 Unit(s) IV Push every 6 hours PRN For aPTT less than 40  heparin  Injectable 3000 Unit(s) IV Push every 6 hours PRN For aPTT between 40 - 57  oxyCODONE    IR 10 milliGRAM(s) Oral every 4 hours PRN Severe Pain (7 - 10)  zolpidem 10 milliGRAM(s) Oral at bedtime PRN Insomnia      LABS:                        12.8   15.84 )-----------( 172      ( 22 Oct 2018 06:20 )             39.6     10-22    139  |  100  |  29<H>  ----------------------------<  148<H>  4.2   |  31  |  1.09    Ca    8.0<L>      22 Oct 2018 06:21  Phos  3.3     10-22  Mg     2.3     10-22  TPro  4.3<L>  /  Alb  1.2<L>  /  TBili  0.2  /  DBili  x   /  AST  28  /  ALT  30  /  AlkPhos  105  10-22  PT/INR - ( 21 Oct 2018 14:42 )   PT: 8.7 sec;   INR: 0.80 ratio    PTT - ( 21 Oct 2018 14:42 )  PTT:24.7 sec    CAPILLARY BLOOD GLUCOSE  POCT Blood Glucose.: 224 mg/dL (22 Oct 2018 16:47)  POCT Blood Glucose.: 239 mg/dL (22 Oct 2018 11:31)  POCT Blood Glucose.: 144 mg/dL (22 Oct 2018 07:44)  POCT Blood Glucose.: 250 mg/dL (21 Oct 2018 22:46)    RADIOLOGY & ADDITIONAL TESTS:  < from: CT Abdomen and Pelvis w/ IV Cont (10.21.18 @ 18:52) >  1. Bilateral pulmonary emboli.  2. Small left pleural effusion.  3. Right ninth rib lytic lesion with suspected pathologic fracture.  4. Mediastinal and hilar adenopathy.  5. Gastric wall thickening, question gastritis.  6. Cystic pancreatic neck lesion (6 mm). A one-year follow-up MRI is   recommended to assess stability  7. Bilateral renal vein filling defects, suspicious for renal vein   thrombosis, or less likely mixing artifact. The right renal vein filling   defect partially extends into the inferior vena cava.  8. Left iliac wing lytic lesion, suspicious for metastasis.  9. Indeterminate nodular adrenal thickening.    < end of copied text >      DVT/GI ppx  Discussed with pt @ bedside

## 2018-10-22 NOTE — PROGRESS NOTE ADULT - PROBLEM SELECTOR PLAN 4
- planning to taper steriods slowly, will decrease prednisone by 5 mg every 3 days.    - Nephrology eval pending

## 2018-10-22 NOTE — CONSULT NOTE ADULT - SUBJECTIVE AND OBJECTIVE BOX
Chief Complaint:  Patient is a 63y old  Male who presents with a chief complaint of Suspicious pelvic lesion on outpatient MRI (21 Oct 2018 21:33)      HPI:  Patient is a 63 year old male with PMH prediabetes, HTN, COPD, nephrotic syndrome on prednisone, who was sent in by Dr. Rajan to further workup a suspicious iliac lesion found on MRI. Patient reports chronic back pain requiring spinal fusions x 2, but states that he's had new, worsening right lower back pain that radiates down his leg for several weeks. Takes oxy 10 at home with temporary relief of pain. Ambulates without difficulty.   Denies fever, chills, chest pain, shortness of breath, abdominal pain. (21 Oct 2018 17:04) We were asked to evaluate patient for abnormal CT scan      PMH/PSH:PAST MEDICAL & SURGICAL HISTORY:  COPD (chronic obstructive pulmonary disease)  Nephrotic syndrome  History of cholecystectomy  H/O spinal fusion  Colonoscopy  5 years ago with polyps in [facility]  GERD  Allergies:  penicillin (Unknown)      Medications:  ALBUTerol/ipratropium for Nebulization 3 milliLiter(s) Nebulizer every 6 hours PRN  atorvastatin 40 milliGRAM(s) Oral daily  dextrose 40% Gel 15 Gram(s) Oral once PRN  dextrose 5%. 1000 milliLiter(s) IV Continuous <Continuous>  dextrose 50% Injectable 12.5 Gram(s) IV Push once  dextrose 50% Injectable 25 Gram(s) IV Push once  dextrose 50% Injectable 25 Gram(s) IV Push once  diazepam    Tablet 5 milliGRAM(s) Oral every 8 hours PRN  glucagon  Injectable 1 milliGRAM(s) IntraMuscular once PRN  insulin lispro (HumaLOG) corrective regimen sliding scale   SubCutaneous three times a day before meals  lamoTRIgine 100 milliGRAM(s) Oral two times a day  losartan 25 milliGRAM(s) Oral daily  magnesium oxide 400 milliGRAM(s) Oral daily  montelukast 10 milliGRAM(s) Oral daily  oxyCODONE    IR 10 milliGRAM(s) Oral every 4 hours PRN  predniSONE   Tablet 50 milliGRAM(s) Oral daily  tamsulosin 0.4 milliGRAM(s) Oral at bedtime  torsemide 20 milliGRAM(s) Oral daily  zolpidem 10 milliGRAM(s) Oral at bedtime PRN      Review of Systems:    General:  No weight loss, fevers, chills, night sweats, fatigue,   Eyes:  Good vision, no reported pain  ENT:  No sore throat, pain, runny nose, dysphagia  CV:  No pain, palpitations, hypo/hypertension  Resp:  No dyspnea, cough, tachypnea, wheezing  GI:  No pain, No nausea, No vomiting, No diarrhea, No constipation, No pruritis, No rectal bleeding, No tarry stools, No dysphagia,  :  No pain, bleeding, incontinence, nocturia  Muscle:  No pain, weakness  Breast:  No pain, abscess, mass, discharge  Neuro:  No weakness, tingling, memory problems  Psych:  No fatigue, insomnia, mood problems, depression  Endocrine:  No polyuria, polydipsia, cold/heat intolerance  Heme:  No petechiae, ecchymosis, easy bruisability  Skin:  No rash, tattoos, scars, edema    Relevant Family History:   FAMILY HISTORY:  No pertinent family history in first degree relatives      Relevant Social History: Alcohol ( -) , Tobacco ( -) , Illicit drugs (- )     Physical Exam:    Vital Signs:  Vital Signs Last 24 Hrs  T(C): 36.3 (22 Oct 2018 05:20), Max: 37.1 (21 Oct 2018 13:23)  T(F): 97.4 (22 Oct 2018 05:20), Max: 98.8 (21 Oct 2018 13:23)  HR: 89 (22 Oct 2018 05:20) (70 - 103)  BP: 125/96 (22 Oct 2018 05:20) (120/79 - 160/78)  BP(mean): --  RR: 17 (22 Oct 2018 05:20) (17 - 18)  SpO2: 97% (22 Oct 2018 05:20) (96% - 98%)  Daily Height in cm: 167.64 (21 Oct 2018 20:40)    Daily Weight in k (22 Oct 2018 07:50)    General:  Appears stated age, well-groomed, well-nourished, no distress  HEENT:  NC/AT,  conjunctivae clear and pink, no thyromegaly, nodules, adenopathy, no JVD, anicteric sclera  Chest:  Full & symmetric excursion, no increased effort, breath sounds clear  Cardiovascular:  Regular rhythm, S1, S2, no murmur/rub/S3/S4, no abdominal bruit, no edema  Abdomen:  Soft, non tender, Obese non distended, normoactive bowel sounds,  no masses , no hepatosplenomegaly, no signs of chronic liver disease  Extremities:  no cyanosis, clubbing or edema  Skin:  No rash/erythema/ecchymoses/petechiae/wounds/abscess/warm/dry  Neuro/Psych:  Alert, oriented, no asterixis, no tremor, no encephalopathy    Laboratory:                          12.8   15.84 )-----------( 172      ( 22 Oct 2018 06:20 )             39.6     10-22    139  |  100  |  29<H>  ----------------------------<  148<H>  4.2   |  31  |  1.09    Ca    8.0<L>      22 Oct 2018 06:21  Phos  3.3     10-22  Mg     2.3     10-22    TPro  4.3<L>  /  Alb  1.2<L>  /  TBili  0.2  /  DBili  x   /  AST  28  /  ALT  30  /  AlkPhos  105  10-22    LIVER FUNCTIONS - ( 22 Oct 2018 06:21 )  Alb: 1.2 g/dL / Pro: 4.3 gm/dL / ALK PHOS: 105 U/L / ALT: 30 U/L / AST: 28 U/L / GGT: x           PT/INR - ( 21 Oct 2018 14:42 )   PT: 8.7 sec;   INR: 0.80 ratio         PTT - ( 21 Oct 2018 14:42 )  PTT:24.7 sec        Intake and Output    10-22-18 @ 07:01  -  10-22-18 @ 10:12  --------------------------------------------------------  IN: 120 mL / OUT: 0 mL / NET: 120 mL        Imaging:  EXAM:  CT ABDOMEN AND PELVIS IC                        EXAM:  CT CHEST IC                        PROCEDURE DATE:  10/21/2018    INTERPRETATION:  CLINICAL INFORMATION: Follow-up lytic spinal lesions    COMPARISON: None.    PROCEDURE:   CT of the Chest, Abdomen and Pelvis was performed with intravenous   contrast.   Intravenous contrast: 96 ml Omnipaque 350. 4 ml discarded.  Oral contrast: None.  Sagittal and coronal reformats were performed.    FINDINGS:    CHEST:     LUNGS AND LARGEAIRWAYS: Patent central airways. Dependent and basilar   atelectasis.  PLEURA: Small left pleural effusion.  VESSELS: Atherosclerotic changes of thoracic aorta and coronary arteries.   Distal pulmonary arterial filling defects within the right upper,middle,   and lower lobes and left upper and lower lobes.  HEART: Heart size is normal. No pericardial effusion.  MEDIASTINUM AND ALVARO: Mediastinal and hilar adenopathy measuring up to   2.9 cm.  CHEST WALL AND LOWER NECK: Within normal limits.    ABDOMEN AND PELVIS:    LIVER: Within normal limits.  BILE DUCTS: Normal caliber.  GALLBLADDER: Status post cholecystectomy.  SPLEEN: Within normal limits.  PANCREAS: 6 mm cystic pancreatic neck lesion, possibly chronic pseudocyst   or small intraductal papillary mucinous neoplasm.  ADRENALS: Nodular thickening.  KIDNEYS/URETERS: Filling defects in bilateral renal veins with partial   extension into the inferior vena cava from the right. Nonspecific   perinephric stranding. No hydronephrosis.    BLADDER: Mild wall thickening.  REPRODUCTIVE ORGANS: Central prostatic gland calcifications.    BOWEL: Gastric wall thickening. No bowel obstruction. Appendix within   normal limits. Colonic diverticulosis.  PERITONEUM: No ascites.  VESSELS:  Atherosclerotic changes.  RETROPERITONEUM: No lymphadenopathy.    ABDOMINAL WALL: Small fat-containing supraumbilical ventral abdominal   hernia. Subcutaneous soft tissue edema.  BONES: Spinal degenerative changes. Status post L3-L5 posterior fusion.   Left iliacwing lytic lesion. Right ninth rib lytic lesion with suspected   pathologic fracture.    IMPRESSION:     1. Bilateral pulmonary emboli.  2. Small left pleural effusion.  3. Right ninth rib lytic lesion with suspected pathologic fracture.  4. Mediastinal and hilar adenopathy.  5. Gastric wall thickening, question gastritis.  6. Cystic pancreatic neck lesion (6 mm). A one-year follow-up MRI is   recommended to assess stability  7. Bilateral renal vein filling defects, suspicious for renal vein   thrombosis, or less likely mixing artifact. The right renal vein filling   defect partially extends into the inferior vena cava.  8. Left iliac wing lytic lesion, suspicious for metastasis.  9. Indeterminate nodular adrenal thickening.    Preliminary report was provided by Dr. Nixon [facility]) on   10/21/2018 at 2024 hours.  Updated findings were discussed with COOPER Portillo by telephone on   10/22/2018 at 0835 hours.    TRISH OAKLEY M.D., ATTENDING RADIOLOGIST  This document hasbeen electronically signed. Oct 22 2018  8:41AM

## 2018-10-22 NOTE — CONSULT NOTE ADULT - ASSESSMENT
Juan Pickard [facility]  [id number] 1955   Dr Lanre Rajan   ALLERGY Pcn   CONTACT Rinku Meneses Z    REASON FOR VISIT   Initial evaluation/Pulmonary consultation requested 10/22/2018  by Dr Lanre Rajan    from Dr Romero   Patient examined chart reviewed  HOSPITAL ADMISSION [facility] 10/21/2018     PATIENT CAME  FROM (if information available)      VITALS/LABS                           10/22/2018 AFEB 94 118/78 17 98  10/22/2018 W 15.8 Hb 12.8 Plt 172 Na 139 K 4.2 CO2 31 Cr 1 G 1    10/22/2018 Pct .12   10/21/2018 CT ch   1) Dependent and basal atalectasis   2) Small l pl effsn   3) Distal pulm artery filling defects in r upper middleand lower lobes and l upper and lower lobes   4) Mediastinal and hilar lne upto 2.9 cm   5) 6 mm cystic pancreatic neck lesion possibly chr pseudocyst or small intraductal papillary mucinous neoplasm   6) Adrenal nodular thickening   7) Filling defects in bl renal veins with partial extension into ivc from the r  8) Gastric wall thickening   9) sp L3-5 post fusion   10) L iliac wing lytic lesion  11) R 9th rib lytic lesion     REVIEW OF SYMPTOMS    Able to give ROS  Yes     RELIABLE No   CONSTITUTIONAL Weakness Yes  Chills No Vision changes No  ENDOCRINE No unexplained hair loss No heat or cold intolerance    ALLERGY No hives  Sore throat No   RESP Coughing blood no  Shortness of breath YES   NEURO No Headache  Confusion Pain neck No   CARDIAC No Chest pain No Palpitations   GI No Pain abdomen NO   Vomiting NO     PHYSICAL EXAM    HEENT Unremarkable PERRLA atraumatic   RESP Fair air entry EXP prolonged    Harsh breath sound Resp distres mild   CARDIAC S1 S2 No S3     NO JVD    ABDOMEN SOFT BS PRESENT NOT DISTENDED No hepatosplenomegaly PEDAL EDEMA present No calf tenderness  NO rash   GENERAL Not TOXIC looking    PAST MEDICAL SURGICAL HISTORY  PAST MEDICAL & SURGICAL HISTORY:  COPD (chronic obstructive pulmonary disease)  Nephrotic syndrome  History of cholecystectomy  H/O spinal fusion      HOSPITAL COURSE 10/21/2018  ADMISSION [facility] VS   63 year old male with history of prediabetes, HTN, COPD, nephrotic syndrome on prednisone 60mg x 1 month, Dr Senior nephconstantine chronic back pain requiring spinal fusions x 2 who was sent in by Dr. Rajan to get CT w/ contrast to further workup a suspicious iliac lesion found on MRI. Patient reports that he's had severe back pain w/ numbness radiating down his right leg x 2months. A week ago he went to Dr. Rajan, who ordered the MRI. When he wanted to follow it up w/ a CT w/ contrast, the pt's nephrologist asked that it be done as inpt under the supervision of a nephrologist. Pt denies fever, chills, chest pain, shortness of breath, abdominal pain.   OCCUPN Retd   HABITS Quit smoking 4 y     PATIENT PROBLEMS 10/21/2018  ADMISSION [facility]   LEUKOCYTOSIS 10/21-10/22/2018 w 21.1-15.8   BASAL ATELECTASIS (CT CAP 10/21)   SMALL L PL EFFSN  (CT CAP 10/21)   BL PULM EMBOLI  (CT CAP 10/21)   HILOMEDIAST LNE 2.9 CM  (CT CAP 10/21)   6 MM INTRADUCTAL PAPILLARY MUCINOUS NEOPLASM PANCREAS NECK  (CT CAP 10/21)   INDETERMINATE ADRENAL NODULAR THICKENING  (CT CAP 10/21)   RENAL VEIN THROMBOSIS  (CT CAP 10/21)   GASTRIC WALL THICKENING  (CT CAP 10/21)   PAST L35 FUSION  (CT CAP 10/21)   L ILIAC WING LYTIC DEFECT  (CT CAP 10/21)   R 9TH RIB LYTIC DEFECT  (CT CAP 10/21)     PATIENT MANAGEMENT 10/21/2018  ADMISSION [facility] VS   LYTIC LESIONS ILIAC ADRENAL NODULE    10/22/2018 Called IR to see if they can do biopsy of one of the lesions     MEDIAST LNE   10/22/2018 Will dw CTS re mediastinoscopic biopsy    VENOUS THROMBOEMBOLISM  10/22/2018 Check ECHO   10/22/2018 Check V duplex legs  10/22/2018 Start IV UFH    TIME SPENT Over 55 minutes aggregate care time spent on encounter; activities included   direct patient care, counseling and/or coordinating care reviewing notes, lab data/ imaging , discussion with multidisciplinary team/ patient  /family. Risks, benefits, alternatives  discussed in detail.

## 2018-10-23 LAB
ANION GAP SERPL CALC-SCNC: 7 MMOL/L — SIGNIFICANT CHANGE UP (ref 5–17)
APTT BLD: 111.2 SEC — HIGH (ref 27.5–37.4)
APTT BLD: 134.8 SEC — CRITICAL HIGH (ref 27.5–37.4)
APTT BLD: 87.1 SEC — HIGH (ref 27.5–37.4)
BASOPHILS # BLD AUTO: 0.04 K/UL — SIGNIFICANT CHANGE UP (ref 0–0.2)
BASOPHILS NFR BLD AUTO: 0.3 % — SIGNIFICANT CHANGE UP (ref 0–2)
BUN SERPL-MCNC: 26 MG/DL — HIGH (ref 7–23)
CALCIUM SERPL-MCNC: 8 MG/DL — LOW (ref 8.5–10.1)
CANCER AG19-9 SERPL-ACNC: <1 U/ML — SIGNIFICANT CHANGE UP
CHLORIDE SERPL-SCNC: 99 MMOL/L — SIGNIFICANT CHANGE UP (ref 96–108)
CO2 SERPL-SCNC: 33 MMOL/L — HIGH (ref 22–31)
CREAT ?TM UR-MCNC: 96 MG/DL — SIGNIFICANT CHANGE UP
CREAT SERPL-MCNC: 1.05 MG/DL — SIGNIFICANT CHANGE UP (ref 0.5–1.3)
EOSINOPHIL # BLD AUTO: 0.46 K/UL — SIGNIFICANT CHANGE UP (ref 0–0.5)
EOSINOPHIL NFR BLD AUTO: 3.5 % — SIGNIFICANT CHANGE UP (ref 0–6)
FERRITIN SERPL-MCNC: 524 NG/ML — HIGH (ref 30–400)
FOLATE SERPL-MCNC: 19.6 NG/ML — SIGNIFICANT CHANGE UP
GLUCOSE SERPL-MCNC: 144 MG/DL — HIGH (ref 70–99)
HCT VFR BLD CALC: 37.4 % — LOW (ref 39–50)
HCT VFR BLD CALC: 39.3 % — SIGNIFICANT CHANGE UP (ref 39–50)
HGB BLD-MCNC: 12.4 G/DL — LOW (ref 13–17)
HGB BLD-MCNC: 13.2 G/DL — SIGNIFICANT CHANGE UP (ref 13–17)
IMM GRANULOCYTES NFR BLD AUTO: 1.9 % — HIGH (ref 0–1.5)
IRON SATN MFR SERPL: 136 UG/DL — SIGNIFICANT CHANGE UP (ref 45–165)
IRON SATN MFR SERPL: 78 % — HIGH (ref 16–55)
LYMPHOCYTES # BLD AUTO: 19.9 % — SIGNIFICANT CHANGE UP (ref 13–44)
LYMPHOCYTES # BLD AUTO: 2.61 K/UL — SIGNIFICANT CHANGE UP (ref 1–3.3)
MCHC RBC-ENTMCNC: 29 PG — SIGNIFICANT CHANGE UP (ref 27–34)
MCHC RBC-ENTMCNC: 29.1 PG — SIGNIFICANT CHANGE UP (ref 27–34)
MCHC RBC-ENTMCNC: 33.2 GM/DL — SIGNIFICANT CHANGE UP (ref 32–36)
MCHC RBC-ENTMCNC: 33.6 GM/DL — SIGNIFICANT CHANGE UP (ref 32–36)
MCV RBC AUTO: 86.8 FL — SIGNIFICANT CHANGE UP (ref 80–100)
MCV RBC AUTO: 87.4 FL — SIGNIFICANT CHANGE UP (ref 80–100)
MONOCYTES # BLD AUTO: 0.72 K/UL — SIGNIFICANT CHANGE UP (ref 0–0.9)
MONOCYTES NFR BLD AUTO: 5.5 % — SIGNIFICANT CHANGE UP (ref 2–14)
NEUTROPHILS # BLD AUTO: 9.01 K/UL — HIGH (ref 1.8–7.4)
NEUTROPHILS NFR BLD AUTO: 68.9 % — SIGNIFICANT CHANGE UP (ref 43–77)
NRBC # BLD: 0 /100 WBCS — SIGNIFICANT CHANGE UP (ref 0–0)
PLATELET # BLD AUTO: 164 K/UL — SIGNIFICANT CHANGE UP (ref 150–400)
PLATELET # BLD AUTO: 173 K/UL — SIGNIFICANT CHANGE UP (ref 150–400)
POTASSIUM SERPL-MCNC: 4 MMOL/L — SIGNIFICANT CHANGE UP (ref 3.5–5.3)
POTASSIUM SERPL-SCNC: 4 MMOL/L — SIGNIFICANT CHANGE UP (ref 3.5–5.3)
PROT ?TM UR-MCNC: 1236 MG/DL — HIGH (ref 0–12)
PROT/CREAT UR-RTO: 12.2 RATIO — HIGH (ref 0–0.2)
RBC # BLD: 4.28 M/UL — SIGNIFICANT CHANGE UP (ref 4.2–5.8)
RBC # BLD: 4.53 M/UL — SIGNIFICANT CHANGE UP (ref 4.2–5.8)
RBC # FLD: 16.2 % — HIGH (ref 10.3–14.5)
RBC # FLD: 16.5 % — HIGH (ref 10.3–14.5)
SODIUM SERPL-SCNC: 139 MMOL/L — SIGNIFICANT CHANGE UP (ref 135–145)
TIBC SERPL-MCNC: 174 UG/DL — LOW (ref 220–430)
UIBC SERPL-MCNC: 38 UG/DL — LOW (ref 110–370)
VIT B12 SERPL-MCNC: 330 PG/ML — SIGNIFICANT CHANGE UP (ref 232–1245)
WBC # BLD: 13.09 K/UL — HIGH (ref 3.8–10.5)
WBC # BLD: 15.19 K/UL — HIGH (ref 3.8–10.5)
WBC # FLD AUTO: 13.09 K/UL — HIGH (ref 3.8–10.5)
WBC # FLD AUTO: 15.19 K/UL — HIGH (ref 3.8–10.5)

## 2018-10-23 PROCEDURE — 99233 SBSQ HOSP IP/OBS HIGH 50: CPT

## 2018-10-23 PROCEDURE — 99221 1ST HOSP IP/OBS SF/LOW 40: CPT

## 2018-10-23 PROCEDURE — 93306 TTE W/DOPPLER COMPLETE: CPT | Mod: 26

## 2018-10-23 RX ORDER — INSULIN LISPRO 100/ML
5 VIAL (ML) SUBCUTANEOUS
Qty: 0 | Refills: 0 | Status: DISCONTINUED | OUTPATIENT
Start: 2018-10-23 | End: 2018-10-25

## 2018-10-23 RX ORDER — DIAZEPAM 5 MG
5 TABLET ORAL EVERY 8 HOURS
Qty: 0 | Refills: 0 | Status: DISCONTINUED | OUTPATIENT
Start: 2018-10-23 | End: 2018-10-25

## 2018-10-23 RX ADMIN — Medication 20 MILLIGRAM(S): at 17:58

## 2018-10-23 RX ADMIN — OXYCODONE HYDROCHLORIDE 10 MILLIGRAM(S): 5 TABLET ORAL at 11:00

## 2018-10-23 RX ADMIN — Medication 20 MILLIGRAM(S): at 05:11

## 2018-10-23 RX ADMIN — HEPARIN SODIUM 0 UNIT(S)/HR: 5000 INJECTION INTRAVENOUS; SUBCUTANEOUS at 04:30

## 2018-10-23 RX ADMIN — HEPARIN SODIUM 1000 UNIT(S)/HR: 5000 INJECTION INTRAVENOUS; SUBCUTANEOUS at 13:13

## 2018-10-23 RX ADMIN — TAMSULOSIN HYDROCHLORIDE 0.4 MILLIGRAM(S): 0.4 CAPSULE ORAL at 21:06

## 2018-10-23 RX ADMIN — MONTELUKAST 10 MILLIGRAM(S): 4 TABLET, CHEWABLE ORAL at 11:51

## 2018-10-23 RX ADMIN — OXYCODONE HYDROCHLORIDE 10 MILLIGRAM(S): 5 TABLET ORAL at 10:09

## 2018-10-23 RX ADMIN — Medication 5 MILLIGRAM(S): at 17:58

## 2018-10-23 RX ADMIN — LOSARTAN POTASSIUM 50 MILLIGRAM(S): 100 TABLET, FILM COATED ORAL at 05:54

## 2018-10-23 RX ADMIN — LAMOTRIGINE 100 MILLIGRAM(S): 25 TABLET, ORALLY DISINTEGRATING ORAL at 17:58

## 2018-10-23 RX ADMIN — Medication 5 UNIT(S): at 17:59

## 2018-10-23 RX ADMIN — Medication 8: at 11:48

## 2018-10-23 RX ADMIN — MAGNESIUM OXIDE 400 MG ORAL TABLET 400 MILLIGRAM(S): 241.3 TABLET ORAL at 13:12

## 2018-10-23 RX ADMIN — HEPARIN SODIUM 1200 UNIT(S)/HR: 5000 INJECTION INTRAVENOUS; SUBCUTANEOUS at 05:52

## 2018-10-23 RX ADMIN — Medication 2: at 08:14

## 2018-10-23 RX ADMIN — ZOLPIDEM TARTRATE 10 MILLIGRAM(S): 10 TABLET ORAL at 22:14

## 2018-10-23 RX ADMIN — LAMOTRIGINE 100 MILLIGRAM(S): 25 TABLET, ORALLY DISINTEGRATING ORAL at 05:54

## 2018-10-23 RX ADMIN — ATORVASTATIN CALCIUM 40 MILLIGRAM(S): 80 TABLET, FILM COATED ORAL at 21:06

## 2018-10-23 RX ADMIN — HEPARIN SODIUM 1000 UNIT(S)/HR: 5000 INJECTION INTRAVENOUS; SUBCUTANEOUS at 19:49

## 2018-10-23 RX ADMIN — Medication 50 MILLIGRAM(S): at 05:11

## 2018-10-23 NOTE — CONSULT NOTE ADULT - PROBLEM SELECTOR RECOMMENDATION 9
Mediastinal Lymphadenopathy.  Lytic Bone Lesions.  Nephrotic Syndrome.  Smoker.  Biopsy for Tissue Dx and Molecular Markers
-npo midnight tonight  -heparin drip to be held at 6:00 Am tomorrow
PO protonix .   upper gastrointestinal endoscopy in future after pulmonary work up

## 2018-10-23 NOTE — PROGRESS NOTE ADULT - SUBJECTIVE AND OBJECTIVE BOX
Juan Pickard [facility]  [id number] 1955   Dr Lanre Rajan   ALLERGY Pcn   CONTACT Sp Gideon Z      VITALS/LABS                           10/23/2018 afeb 90 118/70 18 97%   10/23/2018 W 13 Hb 13.2 Plt 173 Na 139 K 4 CO2 33 Cr 1   10/22/2018 AFEB 94 118/78 17 98  10/22/2018 W 15.8 Hb 12.8 Plt 172 Na 139 K 4.2 CO2 31 Cr 1 G 1    10/22/2018 Pct .12   10/21/2018 CT ch   1) Dependent and basal atalectasis   2) Small l pl effsn   3) Distal pulm artery filling defects in r upper middleand lower lobes and l upper and lower lobes   4) Mediastinal and hilar lne upto 2.9 cm   5) 6 mm cystic pancreatic neck lesion possibly chr pseudocyst or small intraductal papillary mucinous neoplasm   6) Adrenal nodular thickening   7) Filling defects in bl renal veins with partial extension into ivc from the r  8) Gastric wall thickening   9) sp L3-5 post fusion   10) L iliac wing lytic lesion  11) R 9th rib lytic lesion     REVIEW OF SYMPTOMS    Able to give ROS  Yes     RELIABLE No   CONSTITUTIONAL Weakness Yes  Chills No Vision changes No  ENDOCRINE No unexplained hair loss No heat or cold intolerance    ALLERGY No hives  Sore throat No   RESP Coughing blood no  Shortness of breath YES   NEURO No Headache  Confusion Pain neck No   CARDIAC No Chest pain No Palpitations   GI No Pain abdomen NO   Vomiting NO     PHYSICAL EXAM    HEENT Unremarkable PERRLA atraumatic   RESP Fair air entry EXP prolonged    Harsh breath sound Resp distres mild   CARDIAC S1 S2 No S3     NO JVD    ABDOMEN SOFT BS PRESENT NOT DISTENDED No hepatosplenomegaly PEDAL EDEMA present No calf tenderness  NO rash   GENERAL Not TOXIC looking    GLOBAL ISSUE/BEST PRACTICE:      PROBLEM: HOB elevation:   y            PROBLEM: Stress ulcer proph:    na                      PROBLEM: VTE prophylaxis:      IV ufh (10/22)   PROBLEM: Glycemic control:    na  PROBLEM: Nutrition:    reg (10/21)   PROBLEM: Advanced directive: na     PROBLEM: Allergies:  pcn    HOSPITAL COURSE 10/21/2018  ADMISSION [facility]   63 year old male with history of prediabetes, HTN, COPD, nephrotic syndrome on prednisone 60mg x 1 month, Dr Senior nephconstantine chronic back pain requiring spinal fusions x 2 who was sent in by Dr. Rajan to get CT w/ contrast to further workup a suspicious iliac lesion found on MRI. Patient reports that he's had severe back pain w/ numbness radiating down his right leg x 2months. A week ago he went to Dr. Rajan, who ordered the MRI. When he wanted to follow it up w/ a CT w/ contrast, the pt's nephrologist asked that it be done as inpt under the supervision of a nephrologist. Pt denies fever, chills, chest pain, shortness of breath, abdominal pain.   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 Risks, benefits, alternatives  discussed in detail.

## 2018-10-23 NOTE — CONSULT NOTE ADULT - PROBLEM SELECTOR PROBLEM 1
Bone lesion
Bone lesion
Gastritis without bleeding, unspecified chronicity, unspecified gastritis type

## 2018-10-23 NOTE — PROGRESS NOTE ADULT - SUBJECTIVE AND OBJECTIVE BOX
Pt still with back pain. Scheduled for IR aspiration tomorrow. On heparin drip for PEs. No SOB/CP/N/V.     PE:   HEENT: Edematous bags under eyes. EOMI. NC/AT  B/L UE: Skin intact. +ROM shoulder/elbow/wrist/fingers. +ok/thumbsup/fingercross signs.  strength: 5/5.  RP2+ NVI.   Spine: TTP Right lumbar.  RLE: Skin intact. +ROM hip/knee/ankle/toes. Ankle Dorsi/plantarflexion: 5/5. Calf: soft, compressible and nontender. DP/PT 2+ Decreased sensation RLE until ankle.  Left LE: Skin intact. +ROM hip/knee/ankle/toes. Ankle Dorsi/plantarflexion: 5/5. Calf: soft, compressible and nontender. DP/PT 2+ NVI.                         13.2   13.09 )-----------( 173      ( 23 Oct 2018 07:54 )             39.3       10-23    139  |  99  |  26<H>  ----------------------------<  144<H>  4.0   |  33<H>  |  1.05    Ca    8.0<L>      23 Oct 2018 07:54  Phos  3.3     10-22  Mg     2.3     10-22    TPro  4.3<L>  /  Alb  1.2<L>  /  TBili  0.2  /  DBili  x   /  AST  28  /  ALT  30  /  AlkPhos  105  10-22        A/P: 63yMale with suspicious pelvic lesion likely metastatic process  Pain controlled: pain medication PRN/valium PRN  Oncology consultation reviewed: Pt scheduled for IR aspiration lytic lesion iliac   B/L Pulmonary emboli: On Hggt drip  Pulmonary consult: Echo pending, and doppler done-negative.   DVT ppx: Hggt drip  Renal consult: continue prednisone, diuresis, follow up bx results  Isometric exercises, incentive spirometry   Pt seen with dr. estrella in am. Plan developing as there is no definite dx yet.  All the above discussed and understood by pt

## 2018-10-23 NOTE — PROGRESS NOTE ADULT - SUBJECTIVE AND OBJECTIVE BOX
Patient is a 63y old  Male who presents with a chief complaint of Suspicious pelvic lesion on outpatient MRI (23 Oct 2018 11:43)        HPI:  Patient is a 63 year old male with PMH prediabetes, HTN, COPD, nephrotic syndrome on prednisone, who was sent in by Dr. Rajan to further workup a suspicious iliac lesion found on MRI. Patient reports chronic back pain requiring spinal fusions x 2, but states that he's had new, worsening right lower back pain that radiates down his leg for several weeks. Takes oxy 10 at home with temporary relief of pain. Ambulates without difficulty.   Denies fever, chills, chest pain, shortness of breath, abdominal pain. (21 Oct 2018 17:04)      SUBJECTIVE & OBJECTIVE: Pt seen and examined at bedside.     PHYSICAL EXAM:  T(C): 36.3 (10-23-18 @ 05:02), Max: 37.3 (10-22-18 @ 23:59)  HR: 87 (10-23-18 @ 05:02) (87 - 101)  BP: 128/98 (10-23-18 @ 05:02) (128/98 - 146/90)  RR: 18 (10-23-18 @ 05:02) (17 - 18)  SpO2: 96% (10-23-18 @ 05:02) (95% - 97%)  Wt(kg): --   I&O's Detail    22 Oct 2018 07:01  -  23 Oct 2018 07:00  --------------------------------------------------------  IN:    heparin  Infusion.: 250 mL    Oral Fluid: 240 mL  Total IN: 490 mL    OUT:  Total OUT: 0 mL    Total NET: 490 mL        GENERAL: NAD, well-groomed, well-developed, moonlike facies.   HEAD:  Atraumatic, Normocephalic  EYES: EOMI, PERRLA, conjunctiva and sclera clear  ENMT: Moist mucous membranes  NECK: Supple, No JVD  NERVOUS SYSTEM:  Alert & Oriented X3, Motor Strength 5/5 B/L upper and lower extremities; DTRs 2+ intact and symmetric  CHEST/LUNG: Clear to auscultation bilaterally; No rales, rhonchi, wheezing, or rubs  HEART: Regular rate and rhythm; No murmurs, rubs, or gallops  ABDOMEN: Soft, Nontender, Nondistended; Bowel sounds present  EXTREMITIES:  2+ Peripheral Pulses, No clubbing, cyanosis, or edema    MEDICATIONS  (STANDING):  atorvastatin 40 milliGRAM(s) Oral daily  dextrose 5%. 1000 milliLiter(s) (50 mL/Hr) IV Continuous <Continuous>  dextrose 50% Injectable 12.5 Gram(s) IV Push once  dextrose 50% Injectable 25 Gram(s) IV Push once  dextrose 50% Injectable 25 Gram(s) IV Push once  heparin  Infusion.  Unit(s)/Hr (15 mL/Hr) IV Continuous <Continuous>  insulin lispro (HumaLOG) corrective regimen sliding scale   SubCutaneous three times a day before meals  insulin lispro (HumaLOG) corrective regimen sliding scale   SubCutaneous at bedtime  lamoTRIgine 100 milliGRAM(s) Oral two times a day  losartan 50 milliGRAM(s) Oral daily  magnesium oxide 400 milliGRAM(s) Oral daily  montelukast 10 milliGRAM(s) Oral daily  predniSONE   Tablet 50 milliGRAM(s) Oral daily  tamsulosin 0.4 milliGRAM(s) Oral at bedtime  torsemide 20 milliGRAM(s) Oral every 12 hours    MEDICATIONS  (PRN):  ALBUTerol/ipratropium for Nebulization 3 milliLiter(s) Nebulizer every 6 hours PRN Shortness of Breath and/or Wheezing  dextrose 40% Gel 15 Gram(s) Oral once PRN Blood Glucose LESS THAN 70 milliGRAM(s)/deciliter  diazepam    Tablet 5 milliGRAM(s) Oral every 8 hours PRN muscle spasm/anxiety  glucagon  Injectable 1 milliGRAM(s) IntraMuscular once PRN Glucose LESS THAN 70 milligrams/deciliter  heparin  Injectable 6500 Unit(s) IV Push every 6 hours PRN For aPTT less than 40  heparin  Injectable 3000 Unit(s) IV Push every 6 hours PRN For aPTT between 40 - 57  oxyCODONE    IR 10 milliGRAM(s) Oral every 4 hours PRN Severe Pain (7 - 10)  zolpidem 10 milliGRAM(s) Oral at bedtime PRN Insomnia      LABS:                        13.2   13.09 )-----------( 173      ( 23 Oct 2018 07:54 )             39.3     10-23    139  |  99  |  26<H>  ----------------------------<  144<H>  4.0   |  33<H>  |  1.05    Ca    8.0<L>      23 Oct 2018 07:54  Phos  3.3     10-22  Mg     2.3     10-22    TPro  4.3<L>  /  Alb  1.2<L>  /  TBili  0.2  /  DBili  x   /  AST  28  /  ALT  30  /  AlkPhos  105  10-  PT/INR - ( 21 Oct 2018 14:42 )   PT: 8.7 sec;   INR: 0.80 ratio    PTT - ( 23 Oct 2018 03:21 )  PTT:134.8 sec  Urinalysis Basic - ( 22 Oct 2018 21:18 )    Color: Yellow / Appearance: Clear / S.015 / pH: x  Gluc: x / Ketone: Negative  / Bili: Negative / Urobili: Negative mg/dL   Blood: x / Protein: 500 mg/dL / Nitrite: Negative   Leuk Esterase: Negative / RBC: 0-2 /HPF / WBC 0-2   Sq Epi: x / Non Sq Epi: Occasional / Bacteria: Few      CAPILLARY BLOOD GLUCOSE  POCT Blood Glucose.: 313 mg/dL (23 Oct 2018 11:46)  POCT Blood Glucose.: 181 mg/dL (23 Oct 2018 08:10)  POCT Blood Glucose.: 116 mg/dL (22 Oct 2018 22:11)  POCT Blood Glucose.: 224 mg/dL (22 Oct 2018 16:47)    RECENT CULTURES:  Urine culture:  10-21 @ 20:57 --   No growth to date.      RADIOLOGY & ADDITIONAL TESTS:  < from: CT Abdomen and Pelvis w/ IV Cont (10.21.18 @ 18:52) >  IMPRESSION:     1. Bilateral pulmonary emboli.  2. Small left pleural effusion.  3. Right ninth rib lytic lesion with suspected pathologic fracture.  4. Mediastinal and hilar adenopathy.  5. Gastric wall thickening, question gastritis.  6. Cystic pancreatic neck lesion (6 mm). A one-year follow-up MRI is   recommended to assess stability  7. Bilateral renal vein filling defects, suspicious for renal vein   thrombosis, or less likely mixing artifact. The right renal vein filling   defect partially extends into the inferior vena cava.  8. Left iliac wing lytic lesion, suspicious for metastasis.  9. Indeterminate nodular adrenal thickening.    < end of copied text >      DVT/GI ppx  Discussed with pt @ bedside

## 2018-10-23 NOTE — CONSULT NOTE ADULT - SUBJECTIVE AND OBJECTIVE BOX
Patient is a 63y old  Male who presents with a chief complaint of Suspicious pelvic lesion on outpatient MRI (22 Oct 2018 17:52)      HPI:  63 year old male with history of prediabetes, HTN, COPD, nephrotic syndrome on prednisone 60mg x 1 month, Dr Senior nephro chronic back pain requiring spinal fusions x 2 who was sent in by Dr. Rajan to get CT w/ contrast to further workup a suspicious iliac lesion found on MRI. Patient reports that he's had severe back pain w/ numbness radiating down his right leg x 2months. A week ago he went to Dr. Rajan, who ordered the MRI. When he wanted to follow it up w/ a CT w/ contrast, the pt's nephrologist asked that it be done as inpt under the supervision of a nephrologist. Pt denies fever, chills, chest pain, shortness of breath, abdominal pain.   OCCUPN Retd   HABITS Quit smoking 4 y     1) Dependent and basal atalectasis   2) Small l pl effsn   3) Distal pulm artery filling defects in r upper middleand lower lobes and l upper and lower lobes   4) Mediastinal and hilar lne upto 2.9 cm   5) 6 mm cystic pancreatic neck lesion possibly chr pseudocyst or small intraductal papillary mucinous neoplasm   6) Adrenal nodular thickening   7) Filling defects in bl renal veins with partial extension into ivc from the r  8) Gastric wall thickening   9) sp L3-5 post fusion   10) L iliac wing lytic lesion  11) R 9th rib lytic lesion   12) Focal segmental nephrosclerosis, TIP variant, 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 1000 milliLiter(s) (50 mL/Hr) IV Continuous <Continuous>  dextrose 50% Injectable 12.5 Gram(s) IV Push once  dextrose 50% Injectable 25 Gram(s) IV Push once  dextrose 50% Injectable 25 Gram(s) IV Push once  heparin  Infusion.  Unit(s)/Hr (15 mL/Hr) IV Continuous <Continuous>  insulin lispro (HumaLOG) corrective regimen sliding scale   SubCutaneous three times a day before meals  insulin lispro (HumaLOG) corrective regimen sliding scale   SubCutaneous at bedtime  lamoTRIgine 100 milliGRAM(s) Oral two times a day  losartan 50 milliGRAM(s) Oral daily  magnesium oxide 400 milliGRAM(s) Oral daily  montelukast 10 milliGRAM(s) Oral daily  predniSONE   Tablet 50 milliGRAM(s) Oral daily  tamsulosin 0.4 milliGRAM(s) Oral at bedtime  torsemide 20 milliGRAM(s) Oral every 12 hours    MEDICATIONS  (PRN):  ALBUTerol/ipratropium for Nebulization 3 milliLiter(s) Nebulizer every 6 hours PRN Shortness of Breath and/or Wheezing  dextrose 40% Gel 15 Gram(s) Oral once PRN Blood Glucose LESS THAN 70 milliGRAM(s)/deciliter  diazepam    Tablet 5 milliGRAM(s) Oral every 8 hours PRN muscle spasm/anxiety  glucagon  Injectable 1 milliGRAM(s) IntraMuscular once PRN Glucose LESS THAN 70 milligrams/deciliter  heparin  Injectable 6500 Unit(s) IV Push every 6 hours PRN For aPTT less than 40  heparin  Injectable 3000 Unit(s) IV Push every 6 hours PRN For aPTT between 40 - 57  oxyCODONE    IR 10 milliGRAM(s) Oral every 4 hours PRN Severe Pain (7 - 10)  zolpidem 10 milliGRAM(s) Oral at bedtime PRN Insomnia        SOCIAL HISTORY:    FAMILY HISTORY:  No pertinent family history in first degree relatives        PHYSICAL EXAM:    Vital Signs Last 24 Hrs  T(C): 36.3 (23 Oct 2018 05:02), Max: 37.3 (22 Oct 2018 23:59)  T(F): 97.4 (23 Oct 2018 05:02), Max: 99.2 (22 Oct 2018 23:59)  HR: 87 (23 Oct 2018 05:02) (87 - 101)  BP: 128/98 (23 Oct 2018 05:02) (118/78 - 146/90)  BP(mean): --  RR: 18 (23 Oct 2018 05:02) (17 - 18)  SpO2: 96% (23 Oct 2018 05:02) (95% - 98%)    General:  Appears stated age, well-groomed, well-nourished, no distress  Lungs:  CTAB  Cardiovascular:  good S1, S2,   Abdomen:  Soft, non-tender, non-distended,   Extremities:  no calf tenderness/swelling b/l  Musculoskeletal:  Full ROM in all joints w/o swelling/tenderness/effusion  Neuro/Psych:  A &O x 3    LABS:                        13.2   13.09 )-----------( 173      ( 23 Oct 2018 07:54 )             39.3     10    139  |  99  |  26<H>  ----------------------------<  144<H>  4.0   |  33<H>  |  1.05    Ca    8.0<L>      23 Oct 2018 07:54  Phos  3.3     10-  Mg     2.3     10-22    TPro  4.3<L>  /  Alb  1.2<L>  /  TBili  0.2  /  DBili  x   /  AST  28  /  ALT  30  /  AlkPhos  105  10-    PT/INR - ( 21 Oct 2018 14:42 )   PT: 8.7 sec;   INR: 0.80 ratio         PTT - ( 23 Oct 2018 03:21 )  PTT:134.8 sec  Urinalysis Basic - ( 22 Oct 2018 21:18 )    Color: Yellow / Appearance: Clear / S.015 / pH: x  Gluc: x / Ketone: Negative  / Bili: Negative / Urobili: Negative mg/dL   Blood: x / Protein: 500 mg/dL / Nitrite: Negative   Leuk Esterase: Negative / RBC: 0-2 /HPF / WBC 0-2   Sq Epi: x / Non Sq Epi: Occasional / Bacteria: Few        RADIOLOGY & ADDITIONAL STUDIES: Patient is a 63y old  Male who presents with a chief complaint of Suspicious pelvic lesion on outpatient MRI (22 Oct 2018 17:52)      HPI:  63 year old male with history of prediabetes, HTN, COPD, nephrotic syndrome on prednisone 60mg x 1 month, Dr Senior nephro chronic back pain requiring spinal fusions x 2 who was sent in by Dr. Rajan to get CT w/ contrast to further workup a suspicious iliac lesion found on MRI. Patient reports that he's had severe back pain w/ numbness radiating down his right leg x 2months. A week ago he went to Dr. Rajan, who ordered the MRI. When he wanted to follow it up w/ a CT w/ contrast, the pt's nephrologist asked that it be done as inpt under the supervision of a nephrologist. Pt denies fever, chills, chest pain, shortness of breath, abdominal pain.   OCCUPN Retd   HABITS Quit smoking 4 y     Pt also presents with:  1) Dependent and basal atalectasis   2) Small l pl effsn   3) Distal pulm artery filling defects in r upper middleand lower lobes and l upper and lower lobes   4) Mediastinal and hilar lne upto 2.9 cm   5) 6 mm cystic pancreatic neck lesion possibly chr pseudocyst or small intraductal papillary mucinous neoplasm   6) Adrenal nodular thickening   7) Filling defects in bl renal veins with partial extension into ivc from the r  8) Gastric wall thickening   9) sp L3-5 post fusion   10) L iliac wing lytic lesion  11) R 9th rib lytic lesion   12) Focal segmental nephrosclerosis, TIP variant, possibly paraneoplastic;   13) Elevated CEA    PAST MEDICAL & SURGICAL HISTORY:  COPD (chronic obstructive pulmonary disease)  Nephrotic syndrome  History of cholecystectomy  H/O spinal fusion      Allergies    penicillin (Unknown)    Intolerances        MEDICATIONS  (STANDING):  atorvastatin 40 milliGRAM(s) Oral daily  dextrose 5%. 1000 milliLiter(s) (50 mL/Hr) IV Continuous <Continuous>  dextrose 50% Injectable 12.5 Gram(s) IV Push once  dextrose 50% Injectable 25 Gram(s) IV Push once  dextrose 50% Injectable 25 Gram(s) IV Push once  heparin  Infusion.  Unit(s)/Hr (15 mL/Hr) IV Continuous <Continuous>  insulin lispro (HumaLOG) corrective regimen sliding scale   SubCutaneous three times a day before meals  insulin lispro (HumaLOG) corrective regimen sliding scale   SubCutaneous at bedtime  lamoTRIgine 100 milliGRAM(s) Oral two times a day  losartan 50 milliGRAM(s) Oral daily  magnesium oxide 400 milliGRAM(s) Oral daily  montelukast 10 milliGRAM(s) Oral daily  predniSONE   Tablet 50 milliGRAM(s) Oral daily  tamsulosin 0.4 milliGRAM(s) Oral at bedtime  torsemide 20 milliGRAM(s) Oral every 12 hours    MEDICATIONS  (PRN):  ALBUTerol/ipratropium for Nebulization 3 milliLiter(s) Nebulizer every 6 hours PRN Shortness of Breath and/or Wheezing  dextrose 40% Gel 15 Gram(s) Oral once PRN Blood Glucose LESS THAN 70 milliGRAM(s)/deciliter  diazepam    Tablet 5 milliGRAM(s) Oral every 8 hours PRN muscle spasm/anxiety  glucagon  Injectable 1 milliGRAM(s) IntraMuscular once PRN Glucose LESS THAN 70 milligrams/deciliter  heparin  Injectable 6500 Unit(s) IV Push every 6 hours PRN For aPTT less than 40  heparin  Injectable 3000 Unit(s) IV Push every 6 hours PRN For aPTT between 40 - 57  oxyCODONE    IR 10 milliGRAM(s) Oral every 4 hours PRN Severe Pain (7 - 10)  zolpidem 10 milliGRAM(s) Oral at bedtime PRN Insomnia        SOCIAL HISTORY:    FAMILY HISTORY:  No pertinent family history in first degree relatives        PHYSICAL EXAM:    Vital Signs Last 24 Hrs  T(C): 36.3 (23 Oct 2018 05:02), Max: 37.3 (22 Oct 2018 23:59)  T(F): 97.4 (23 Oct 2018 05:02), Max: 99.2 (22 Oct 2018 23:59)  HR: 87 (23 Oct 2018 05:02) (87 - 101)  BP: 128/98 (23 Oct 2018 05:02) (118/78 - 146/90)  BP(mean): --  RR: 18 (23 Oct 2018 05:02) (17 - 18)  SpO2: 96% (23 Oct 2018 05:02) (95% - 98%)    General:  Appears stated age, well-groomed, well-nourished, no distress  Lungs:  CTAB  Cardiovascular:  good S1, S2,   Abdomen:  Soft, non-tender, non-distended,   Extremities:  no calf tenderness/swelling b/l  Musculoskeletal:  Full ROM in all joints w/o swelling/tenderness/effusion  Neuro/Psych:  A &O x 3    LABS:                        13.2   13.09 )-----------( 173      ( 23 Oct 2018 07:54 )             39.3     10-23    139  |  99  |  26<H>  ----------------------------<  144<H>  4.0   |  33<H>  |  1.05    Ca    8.0<L>      23 Oct 2018 07:54  Phos  3.3     10-22  Mg     2.3     10-22    TPro  4.3<L>  /  Alb  1.2<L>  /  TBili  0.2  /  DBili  x   /  AST  28  /  ALT  30  /  AlkPhos  105  10    PT/INR - ( 21 Oct 2018 14:42 )   PT: 8.7 sec;   INR: 0.80 ratio         PTT - ( 23 Oct 2018 03:21 )  PTT:134.8 sec  Urinalysis Basic - ( 22 Oct 2018 21:18 )    Color: Yellow / Appearance: Clear / S.015 / pH: x  Gluc: x / Ketone: Negative  / Bili: Negative / Urobili: Negative mg/dL   Blood: x / Protein: 500 mg/dL / Nitrite: Negative   Leuk Esterase: Negative / RBC: 0-2 /HPF / WBC 0-2   Sq Epi: x / Non Sq Epi: Occasional / Bacteria: Few        RADIOLOGY & ADDITIONAL STUDIES:    CT abdomen/pelvis as above     < from: US Duplex Venous Lower Ext Complete, Bilateral (10.22.18 @ 18:22) >  IMPRESSION:     No evidence of bilateral lower extremity deep venous thrombosis.    < end of copied text >

## 2018-10-23 NOTE — PROGRESS NOTE ADULT - ASSESSMENT
Patient is a 63 year old male with PMH prediabetes, HTN, COPD, nephrotic syndrome on prednisone that is admitted for abnormal MRI    Problem/Plan - 1:  ·  Problem: Other acute pulmonary embolism without acute cor pulmonale.  Plan: - would switch to heparin and plan for NOAC on d/c  - follow TTE today  - follow platelets and cbc daily.     Problem/Plan - 2:  ·  Problem: Renal artery embolism.  Plan: -  plan for Heparin gtt as above.     Problem/Plan - 3:  ·  Problem: R/O Metastatic disease.  Plan: - for US guided biopsy tomorrow     Problem/Plan - 4:  ·  Problem: Nephrotic syndrome.  Plan: - planning to taper steriods slowly, will decrease prednisone by 5 mg every 3 days.    - Nephrology eval pending.     Problem/Plan - 5:  ·  Problem: Chronic bilateral low back pain without sciatica.  Plan: - transfer off Ortho service to Medicine  - prn follow up by Ortho, reccs appreciated.     Problem/Plan - 6:  Problem: Centrilobular emphysema. Plan: - c/w Spiriva daily  - pulm reccs appreciated.    Problem/Plan - 7:  ·  Problem: Gastritis without bleeding, unspecified chronicity, unspecified gastritis type.  Plan: - Eventual endoscopic evaluation  - may use protonix while receiving heparin gtt.     Problem/Plan - 8:  ·  Problem: R/O Anemia in neoplastic disease.  Plan: - follow iron panel, vitamin b12, folate.     Problem/Plan - 9:  ·  Problem: IPMN (intraductal papillary mucinous neoplasm).  Plan: - CEA elevated Carcinoembryonic Antigen: 398.1:  - pending CA-19-9  - follow heme/onc reccs.

## 2018-10-23 NOTE — CONSULT NOTE ADULT - ASSESSMENT
64 yo male with  L iliac wing lytic lesion.  IR consulted for biopsy with anesthesia.  Pt on heparin drip  Also presents with:  1) Dependent and basal atalectasis   2) Small l pl effsn   3) Distal pulm artery filling defects in r upper middle and lower lobes and l upper and lower lobes   4) Mediastinal and hilar lne upto 2.9 cm   5) 6 mm cystic pancreatic neck lesion possibly chr pseudocyst or small intraductal papillary mucinous neoplasm   6) Adrenal nodular thickening   7) Filling defects in bl renal veins with partial extension into ivc from the r  8) Gastric wall thickening   9) sp L3-5 post fusion   10) R 9th rib lytic lesion   11) Focal segmental nephrosclerosis, TIP variant, possibly paraneoplastic; 62 yo male with  L iliac wing lytic lesion.  IR consulted for biopsy with anesthesia.  Pt on heparin drip  Also presents with:  1) Dependent and basal atalectasis   2) Small l pl effsn   3) Distal pulm artery filling defects in r upper middle and lower lobes and l upper and lower lobes   4) Mediastinal and hilar lne upto 2.9 cm   5) 6 mm cystic pancreatic neck lesion possibly chr pseudocyst or small intraductal papillary mucinous neoplasm   6) Adrenal nodular thickening   7) Filling defects in bl renal veins with partial extension into ivc from the r  8) Gastric wall thickening   9) sp L3-5 post fusion   10) R 9th rib lytic lesion   11) Focal segmental nephrosclerosis, TIP variant, possibly paraneoplastic;   12) elevated CEA

## 2018-10-23 NOTE — CONSULT NOTE ADULT - REASON FOR ADMISSION
Suspicious pelvic lesion on outpatient MRI

## 2018-10-23 NOTE — PROGRESS NOTE ADULT - SUBJECTIVE AND OBJECTIVE BOX
Gastroenterology  Patient seen and examined bedside resting comfortably.  No complaints offered.   No abdominal pain  Denies nausea and vomiting. Tolerating diet.  Normal flatus/BM. SEEN with wife and family at bedside     T(F): 98.9 (10-23-18 @ 12:56), Max: 99.2 (10-22-18 @ 23:59)  HR: 90 (10-23-18 @ 12:56) (87 - 101)  BP: 118/73 (10-23-18 @ 12:56) (118/73 - 146/90)  RR: 18 (10-23-18 @ 12:56) (17 - 18)  SpO2: 97% (10-23-18 @ 12:56) (95% - 97%)  Wt(kg): --  CAPILLARY BLOOD GLUCOSE      POCT Blood Glucose.: 313 mg/dL (23 Oct 2018 11:46)  POCT Blood Glucose.: 181 mg/dL (23 Oct 2018 08:10)  POCT Blood Glucose.: 116 mg/dL (22 Oct 2018 22:11)      PHYSICAL EXAM:  General: NAD, WDWN.   Neuro:  Alert & oriented x 3  HEENT: NCAT, EOMI, conjunctiva clear  CV: +S1+S2 regular rate and rhythm  Lung: clear to ausculation bilaterally, respirations nonlabored, good inspiratory effort  Abdomen: soft, Non Tender,Obese  No distention Normal active BS  Extremities: no cyanosis, clubbing or edema    LABS:                        13.2   13.09 )-----------( 173      ( 23 Oct 2018 07:54 )             39.3     10-23    139  |  99  |  26<H>  ----------------------------<  144<H>  4.0   |  33<H>  |  1.05    Ca    8.0<L>      23 Oct 2018 07:54  Phos  3.3     10-22  Mg     2.3     10-22    TPro  4.3<L>  /  Alb  1.2<L>  /  TBili  0.2  /  DBili  x   /  AST  28  /  ALT  30  /  AlkPhos  105  10-22    LIVER FUNCTIONS - ( 22 Oct 2018 06:21 )  Alb: 1.2 g/dL / Pro: 4.3 gm/dL / ALK PHOS: 105 U/L / ALT: 30 U/L / AST: 28 U/L / GGT: x           PTT - ( 23 Oct 2018 12:34 )  PTT:111.2 sec  I&O's Detail    22 Oct 2018 07:01  -  23 Oct 2018 07:00  --------------------------------------------------------  IN:    heparin  Infusion.: 250 mL    Oral Fluid: 240 mL  Total IN: 490 mL    OUT:  Total OUT: 0 mL    Total NET: 490 mL      23 Oct 2018 07:01  -  23 Oct 2018 16:55  --------------------------------------------------------  IN:    Oral Fluid: 540 mL  Total IN: 540 mL    OUT:  Total OUT: 0 mL    Total NET: 540 mL        10-23 @ 07:54    139 | 99 | 26  /8.0 | -- | --  _______________________/  4.0 | 33 | 1.05                           \par   Iron Total, Serum: 136 ug/dL (10-23 @ 10:03)

## 2018-10-23 NOTE — CONSULT NOTE ADULT - SUBJECTIVE AND OBJECTIVE BOX
REASON FOR CONSULTATION:  Bone Metastases.    INTERVAL HISTORY:  Cough.  Dyspnea.  Lytic bone lesions.      Allergies    penicillin (Unknown)    Intolerances        MEDICATIONS  (STANDING):  atorvastatin 40 milliGRAM(s) Oral daily  dextrose 5%. 1000 milliLiter(s) (50 mL/Hr) IV Continuous <Continuous>  dextrose 50% Injectable 12.5 Gram(s) IV Push once  dextrose 50% Injectable 25 Gram(s) IV Push once  dextrose 50% Injectable 25 Gram(s) IV Push once  heparin  Infusion.  Unit(s)/Hr (15 mL/Hr) IV Continuous <Continuous>  insulin lispro (HumaLOG) corrective regimen sliding scale   SubCutaneous three times a day before meals  insulin lispro (HumaLOG) corrective regimen sliding scale   SubCutaneous at bedtime  lamoTRIgine 100 milliGRAM(s) Oral two times a day  losartan 50 milliGRAM(s) Oral daily  magnesium oxide 400 milliGRAM(s) Oral daily  montelukast 10 milliGRAM(s) Oral daily  predniSONE   Tablet 50 milliGRAM(s) Oral daily  tamsulosin 0.4 milliGRAM(s) Oral at bedtime  torsemide 20 milliGRAM(s) Oral every 12 hours    MEDICATIONS  (PRN):  ALBUTerol/ipratropium for Nebulization 3 milliLiter(s) Nebulizer every 6 hours PRN Shortness of Breath and/or Wheezing  dextrose 40% Gel 15 Gram(s) Oral once PRN Blood Glucose LESS THAN 70 milliGRAM(s)/deciliter  diazepam    Tablet 5 milliGRAM(s) Oral every 8 hours PRN muscle spasm/anxiety  glucagon  Injectable 1 milliGRAM(s) IntraMuscular once PRN Glucose LESS THAN 70 milligrams/deciliter  heparin  Injectable 6500 Unit(s) IV Push every 6 hours PRN For aPTT less than 40  heparin  Injectable 3000 Unit(s) IV Push every 6 hours PRN For aPTT between 40 - 57  oxyCODONE    IR 10 milliGRAM(s) Oral every 4 hours PRN Severe Pain (7 - 10)  zolpidem 10 milliGRAM(s) Oral at bedtime PRN Insomnia      Vital Signs Last 24 Hrs  T(C): 36.3 (23 Oct 2018 05:02), Max: 37.3 (22 Oct 2018 23:59)  T(F): 97.4 (23 Oct 2018 05:02), Max: 99.2 (22 Oct 2018 23:59)  HR: 87 (23 Oct 2018 05:02) (87 - 101)  BP: 128/98 (23 Oct 2018 05:02) (118/78 - 146/90)  BP(mean): --  RR: 18 (23 Oct 2018 05:02) (17 - 18)  SpO2: 96% (23 Oct 2018 05:02) (95% - 98%)    PHYSICAL EXAM:    EYES: EOMI, PERRLA, conjunctiva and sclera clear  CHEST/LUNG: Clear to percussion bilaterally;   HEART: Regular rate and rhythm;   ABDOMEN: Soft, Nontender, Nondistended;   LYMPH: No lymphadenopathy noted.    Edema:- +++    LABS:                        13.2   13.09 )-----------( 173      ( 23 Oct 2018 07:54 )             39.3     10-    139  |  99  |  26<H>  ----------------------------<  144<H>  4.0   |  33<H>  |  1.05    Ca    8.0<L>      23 Oct 2018 07:54  Phos  3.3     10-  Mg     2.3     10-    TPro  4.3<L>  /  Alb  1.2<L>  /  TBili  0.2  /  DBili  x   /  AST  28  /  ALT  30  /  AlkPhos  105  10-22    PT/INR - ( 21 Oct 2018 14:42 )   PT: 8.7 sec;   INR: 0.80 ratio         PTT - ( 23 Oct 2018 03:21 )  PTT:134.8 sec  Urinalysis Basic - ( 22 Oct 2018 21:18 )    Color: Yellow / Appearance: Clear / S.015 / pH: x  Gluc: x / Ketone: Negative  / Bili: Negative / Urobili: Negative mg/dL   Blood: x / Protein: 500 mg/dL / Nitrite: Negative   Leuk Esterase: Negative / RBC: 0-2 /HPF / WBC 0-2   Sq Epi: x / Non Sq Epi: Occasional / Bacteria: Few          RADIOLOGY & ADDITIONAL STUDIES:    < from: CT Chest w/ IV Cont (10.21.18 @ 18:52) >                 PROCEDURE DATE:  10/21/2018          INTERPRETATION:  CLINICAL INFORMATION: Follow-up lytic spinal lesions    COMPARISON: None.    PROCEDURE:   CT of the Chest, Abdomen and Pelvis was performed with intravenous   contrast.   Intravenous contrast: 96 ml Omnipaque 350. 4 ml discarded.  Oral contrast: None.  Sagittal and coronal reformats were performed.    FINDINGS:    CHEST:     LUNGS AND LARGEAIRWAYS: Patent central airways. Dependent and basilar   atelectasis.  PLEURA: Small left pleural effusion.  VESSELS: Atherosclerotic changes of thoracic aorta and coronary arteries.   Distal pulmonary arterial filling defects within the right upper,middle,   and lower lobes and left upper and lower lobes.  HEART: Heart size is normal. No pericardial effusion.  MEDIASTINUM AND ALVARO: Mediastinal and hilar adenopathy measuring up to   2.9 cm.  CHEST WALL AND LOWER NECK: Within normal limits.    ABDOMEN AND PELVIS:    LIVER: Within normal limits.  BILE DUCTS: Normal caliber.  GALLBLADDER: Status post cholecystectomy.  SPLEEN: Within normal limits.  PANCREAS: 6 mm cystic pancreatic neck lesion, possibly chronic pseudocyst   or small intraductal papillary mucinous neoplasm.  ADRENALS: Nodular thickening.  KIDNEYS/URETERS: Filling defects in bilateral renal veins with partial   extension into the inferior vena cava from the right. Nonspecific   perinephric stranding. No hydronephrosis.    BLADDER: Mild wall thickening.  REPRODUCTIVE ORGANS: Central prostatic gland calcifications.    BOWEL: Gastric wall thickening. No bowel obstruction. Appendix within   normal limits. Colonic diverticulosis.  PERITONEUM: No ascites.  VESSELS:  Atherosclerotic changes.  RETROPERITONEUM: No lymphadenopathy.    ABDOMINAL WALL: Small fat-containing supraumbilical ventral abdominal   hernia. Subcutaneous soft tissue edema.  BONES: Spinal degenerative changes. Status post L3    < end of copied text >    PATHOLOGY:

## 2018-10-24 ENCOUNTER — RESULT REVIEW (OUTPATIENT)
Age: 63
End: 2018-10-24

## 2018-10-24 LAB
ALBUMIN SERPL ELPH-MCNC: 1.3 G/DL — LOW (ref 3.3–5)
ALP SERPL-CCNC: 96 U/L — SIGNIFICANT CHANGE UP (ref 40–120)
ALT FLD-CCNC: 30 U/L — SIGNIFICANT CHANGE UP (ref 12–78)
ANION GAP SERPL CALC-SCNC: 9 MMOL/L — SIGNIFICANT CHANGE UP (ref 5–17)
APTT BLD: 82.6 SEC — HIGH (ref 27.5–37.4)
AST SERPL-CCNC: 29 U/L — SIGNIFICANT CHANGE UP (ref 15–37)
BILIRUB SERPL-MCNC: 0.4 MG/DL — SIGNIFICANT CHANGE UP (ref 0.2–1.2)
BUN SERPL-MCNC: 36 MG/DL — HIGH (ref 7–23)
CALCIUM SERPL-MCNC: 7.5 MG/DL — LOW (ref 8.5–10.1)
CHLORIDE SERPL-SCNC: 100 MMOL/L — SIGNIFICANT CHANGE UP (ref 96–108)
CO2 SERPL-SCNC: 32 MMOL/L — HIGH (ref 22–31)
CREAT SERPL-MCNC: 1.39 MG/DL — HIGH (ref 0.5–1.3)
GLUCOSE SERPL-MCNC: 122 MG/DL — HIGH (ref 70–99)
HCT VFR BLD CALC: 37.6 % — LOW (ref 39–50)
HGB BLD-MCNC: 12.1 G/DL — LOW (ref 13–17)
INR BLD: 0.9 RATIO — SIGNIFICANT CHANGE UP (ref 0.88–1.16)
MAGNESIUM SERPL-MCNC: 2.3 MG/DL — SIGNIFICANT CHANGE UP (ref 1.6–2.6)
MCHC RBC-ENTMCNC: 28.3 PG — SIGNIFICANT CHANGE UP (ref 27–34)
MCHC RBC-ENTMCNC: 32.2 GM/DL — SIGNIFICANT CHANGE UP (ref 32–36)
MCV RBC AUTO: 88.1 FL — SIGNIFICANT CHANGE UP (ref 80–100)
NRBC # BLD: 0 /100 WBCS — SIGNIFICANT CHANGE UP (ref 0–0)
PHOSPHATE SERPL-MCNC: 4.4 MG/DL — SIGNIFICANT CHANGE UP (ref 2.5–4.5)
PLATELET # BLD AUTO: 205 K/UL — SIGNIFICANT CHANGE UP (ref 150–400)
POTASSIUM SERPL-MCNC: 4 MMOL/L — SIGNIFICANT CHANGE UP (ref 3.5–5.3)
POTASSIUM SERPL-SCNC: 4 MMOL/L — SIGNIFICANT CHANGE UP (ref 3.5–5.3)
PROT SERPL-MCNC: 4.8 GM/DL — LOW (ref 6–8.3)
PROTHROM AB SERPL-ACNC: 9.8 SEC — SIGNIFICANT CHANGE UP (ref 9.8–12.7)
RBC # BLD: 4.27 M/UL — SIGNIFICANT CHANGE UP (ref 4.2–5.8)
RBC # FLD: 16.5 % — HIGH (ref 10.3–14.5)
SODIUM SERPL-SCNC: 141 MMOL/L — SIGNIFICANT CHANGE UP (ref 135–145)
WBC # BLD: 12.9 K/UL — HIGH (ref 3.8–10.5)
WBC # FLD AUTO: 12.9 K/UL — HIGH (ref 3.8–10.5)

## 2018-10-24 PROCEDURE — 88311 DECALCIFY TISSUE: CPT | Mod: 26

## 2018-10-24 PROCEDURE — 88305 TISSUE EXAM BY PATHOLOGIST: CPT | Mod: 26

## 2018-10-24 PROCEDURE — 77012 CT SCAN FOR NEEDLE BIOPSY: CPT | Mod: 26

## 2018-10-24 PROCEDURE — 99233 SBSQ HOSP IP/OBS HIGH 50: CPT

## 2018-10-24 PROCEDURE — 20225 BONE BIOPSY TROCAR/NDL DEEP: CPT

## 2018-10-24 RX ORDER — HEPARIN SODIUM 5000 [USP'U]/ML
3000 INJECTION INTRAVENOUS; SUBCUTANEOUS EVERY 6 HOURS
Qty: 0 | Refills: 0 | Status: DISCONTINUED | OUTPATIENT
Start: 2018-10-24 | End: 2018-10-25

## 2018-10-24 RX ORDER — HEPARIN SODIUM 5000 [USP'U]/ML
INJECTION INTRAVENOUS; SUBCUTANEOUS
Qty: 25000 | Refills: 0 | Status: DISCONTINUED | OUTPATIENT
Start: 2018-10-24 | End: 2018-10-25

## 2018-10-24 RX ORDER — HEPARIN SODIUM 5000 [USP'U]/ML
6500 INJECTION INTRAVENOUS; SUBCUTANEOUS EVERY 6 HOURS
Qty: 0 | Refills: 0 | Status: DISCONTINUED | OUTPATIENT
Start: 2018-10-24 | End: 2018-10-25

## 2018-10-24 RX ADMIN — LAMOTRIGINE 100 MILLIGRAM(S): 25 TABLET, ORALLY DISINTEGRATING ORAL at 17:26

## 2018-10-24 RX ADMIN — Medication 5 UNIT(S): at 11:52

## 2018-10-24 RX ADMIN — ZOLPIDEM TARTRATE 10 MILLIGRAM(S): 10 TABLET ORAL at 22:43

## 2018-10-24 RX ADMIN — OXYCODONE HYDROCHLORIDE 10 MILLIGRAM(S): 5 TABLET ORAL at 13:15

## 2018-10-24 RX ADMIN — OXYCODONE HYDROCHLORIDE 10 MILLIGRAM(S): 5 TABLET ORAL at 12:18

## 2018-10-24 RX ADMIN — Medication 4: at 11:52

## 2018-10-24 RX ADMIN — MONTELUKAST 10 MILLIGRAM(S): 4 TABLET, CHEWABLE ORAL at 11:53

## 2018-10-24 RX ADMIN — LAMOTRIGINE 100 MILLIGRAM(S): 25 TABLET, ORALLY DISINTEGRATING ORAL at 05:53

## 2018-10-24 RX ADMIN — HEPARIN SODIUM 1000 UNIT(S)/HR: 5000 INJECTION INTRAVENOUS; SUBCUTANEOUS at 04:14

## 2018-10-24 RX ADMIN — ATORVASTATIN CALCIUM 40 MILLIGRAM(S): 80 TABLET, FILM COATED ORAL at 22:43

## 2018-10-24 RX ADMIN — Medication 20 MILLIGRAM(S): at 05:53

## 2018-10-24 RX ADMIN — MAGNESIUM OXIDE 400 MG ORAL TABLET 400 MILLIGRAM(S): 241.3 TABLET ORAL at 11:53

## 2018-10-24 RX ADMIN — Medication 5 UNIT(S): at 16:26

## 2018-10-24 RX ADMIN — LOSARTAN POTASSIUM 50 MILLIGRAM(S): 100 TABLET, FILM COATED ORAL at 05:53

## 2018-10-24 RX ADMIN — TAMSULOSIN HYDROCHLORIDE 0.4 MILLIGRAM(S): 0.4 CAPSULE ORAL at 22:43

## 2018-10-24 RX ADMIN — Medication 5 MILLIGRAM(S): at 15:18

## 2018-10-24 RX ADMIN — Medication 45 MILLIGRAM(S): at 05:53

## 2018-10-24 NOTE — PROGRESS NOTE ADULT - SUBJECTIVE AND OBJECTIVE BOX
INTERVAL History:  Dyspnea.  Orthopnea  Allergies    penicillin (Unknown)    Intolerances        MEDICATIONS  (STANDING):  atorvastatin 40 milliGRAM(s) Oral daily  dextrose 5%. 1000 milliLiter(s) (50 mL/Hr) IV Continuous <Continuous>  dextrose 50% Injectable 12.5 Gram(s) IV Push once  dextrose 50% Injectable 25 Gram(s) IV Push once  dextrose 50% Injectable 25 Gram(s) IV Push once  insulin lispro (HumaLOG) corrective regimen sliding scale   SubCutaneous three times a day before meals  insulin lispro (HumaLOG) corrective regimen sliding scale   SubCutaneous at bedtime  insulin lispro Injectable (HumaLOG) 5 Unit(s) SubCutaneous three times a day before meals  lamoTRIgine 100 milliGRAM(s) Oral two times a day  losartan 50 milliGRAM(s) Oral daily  magnesium oxide 400 milliGRAM(s) Oral daily  montelukast 10 milliGRAM(s) Oral daily  predniSONE   Tablet 45 milliGRAM(s) Oral daily  tamsulosin 0.4 milliGRAM(s) Oral at bedtime    MEDICATIONS  (PRN):  ALBUTerol/ipratropium for Nebulization 3 milliLiter(s) Nebulizer every 6 hours PRN Shortness of Breath and/or Wheezing  dextrose 40% Gel 15 Gram(s) Oral once PRN Blood Glucose LESS THAN 70 milliGRAM(s)/deciliter  diazepam    Tablet 5 milliGRAM(s) Oral every 8 hours PRN Anxiety or Muscle Spasm  glucagon  Injectable 1 milliGRAM(s) IntraMuscular once PRN Glucose LESS THAN 70 milligrams/deciliter  oxyCODONE    IR 10 milliGRAM(s) Oral every 4 hours PRN Severe Pain (7 - 10)  zolpidem 10 milliGRAM(s) Oral at bedtime PRN Insomnia      Vital Signs Last 24 Hrs  T(C): 36.3 (24 Oct 2018 05:31), Max: 37.2 (23 Oct 2018 12:56)  T(F): 97.3 (24 Oct 2018 05:31), Max: 98.9 (23 Oct 2018 12:56)  HR: 99 (24 Oct 2018 05:31) (90 - 102)  BP: 120/90 (24 Oct 2018 05:31) (118/73 - 127/86)  BP(mean): --  RR: 18 (24 Oct 2018 05:31) (18 - 18)  SpO2: 95% (24 Oct 2018 05:31) (95% - 98%)    PHYSICAL EXAM:      EYES: EOMI, PERRLA, conjunctiva and sclera clear  NECK: Supple, No JVD, Normal thyroid  CHEST/LUNG:  rales, rhonchi,   HEART: Regular rate and rhythm;   ABDOMEN: Soft, Nontender.  Edema:- ++        LABS:                        12.1   12.90 )-----------( 205      ( 24 Oct 2018 06:37 )             37.6     10-24    141  |  100  |  36<H>  ----------------------------<  122<H>  4.0   |  32<H>  |  1.39<H>    Ca    7.5<L>      24 Oct 2018 06:37  Phos  4.4     10-  Mg     2.3     10-24    TPro  4.8<L>  /  Alb  1.3<L>  /  TBili  0.4  /  DBili  x   /  AST  29  /  ALT  30  /  AlkPhos  96  10-24    PT/INR - ( 24 Oct 2018 06:37 )   PT: 9.8 sec;   INR: 0.90 ratio         PTT - ( 24 Oct 2018 02:47 )  PTT:82.6 sec  Urinalysis Basic - ( 22 Oct 2018 21:18 )    Color: Yellow / Appearance: Clear / S.015 / pH: x  Gluc: x / Ketone: Negative  / Bili: Negative / Urobili: Negative mg/dL   Blood: x / Protein: 500 mg/dL / Nitrite: Negative   Leuk Esterase: Negative / RBC: 0-2 /HPF / WBC 0-2   Sq Epi: x / Non Sq Epi: Occasional / Bacteria: Few          RADIOLOGY & ADDITIONAL STUDIES:    PATHOLOGY:

## 2018-10-24 NOTE — PROGRESS NOTE ADULT - SUBJECTIVE AND OBJECTIVE BOX
Vascular & Interventional Radiology Post-Procedure Note    Pre-Procedure Diagnosis: Pelvic lytic bone mass  Post-Procedure Diagnosis: Same as pre.  Indications for Procedure: malignancy workup    : Hari  Assistant(s): none    Procedure Details/Findings: CT guided left iliac win ,lytic bone mass needle biopsy    Complications: none  Estimated Blood Loss: Minimal  Specimen: 4 cores in formalin  Contrast: none  Sedation: Anesthesia  Patient Condition/Disposition: Stable, recovery, then floor    Plan: F/u labs Vascular & Interventional Radiology Post-Procedure Note    Pre-Procedure Diagnosis: Pelvic lytic bone mass  Post-Procedure Diagnosis: Same as pre.  Indications for Procedure: malignancy workup    : Hari  Assistant(s): none    Procedure Details/Findings: CT guided left iliac win ,lytic bone mass needle biopsy    Complications: none  Estimated Blood Loss: Minimal  Specimen: 4 cores in formalin  Contrast: none  Sedation: Anesthesia  Patient Condition/Disposition: Stable, recovery, then floor    Plan:   -OK to resume heparin drip at 10pm [facility] 10/24/18  -F/u pathology

## 2018-10-24 NOTE — PROGRESS NOTE ADULT - SUBJECTIVE AND OBJECTIVE BOX
10/24/2018 8a Awaits needle bx iliac Detailed note to follow below 10/24/2018 8a Awaits needle bx iliac Detailed note to follow below           Juan TA  [id number]   Dr Lanre Rajan   [facility]   CONTACT Sp Gideon [facility]      VITALS/LABS                           10/24/2018 afeeb 95 114/63 16 96%   10/24/2018 W 12.9 Hb 12.1 Plt 205 Na 141 K 4 CO2 32 Cr 1.3     REVIEW OF SYMPTOMS    Able to give ROS  Yes     RELIABLE No   CONSTITUTIONAL Weakness Yes  Chills No Vision changes No  ENDOCRINE No unexplained hair loss No heat or cold intolerance    ALLERGY No hives  Sore throat No   RESP Coughing blood no  Shortness of breath YES   NEURO No Headache  Confusion Pain neck No   CARDIAC No Chest pain No Palpitations   GI No Pain abdomen NO   Vomiting NO     PHYSICAL EXAM    HEENT Unremarkable PERRLA atraumatic   RESP Fair air entry EXP prolonged    Harsh breath sound Resp distres mild   CARDIAC S1 S2 No S3     NO JVD    ABDOMEN SOFT BS PRESENT NOT DISTENDED No hepatosplenomegaly PEDAL EDEMA present No calf tenderness  NO rash   GENERAL Not TOXIC looking    GLOBAL ISSUE/BEST PRACTICE:      PROBLEM: HOB elevation:   y            PROBLEM: Stress ulcer proph:    na                      PROBLEM: VTE prophylaxis:      IV ufh (10/22)   PROBLEM: Glycemic control:    na  PROBLEM: Nutrition:    reg (10/21)   PROBLEM: Advanced directive: na     PROBLEM: Allergies:  pcn  PROCEDURE needle bx ilaic 10/24/2018    HOSPITAL COURSE 10/21/2018  ADMISSION [facility]   63 year old male with history of prediabetes, HTN, COPD, nephrotic syndrome on prednisone 60mg x 1 month, Dr Parrish bright chronic back pain requiring spinal fusions x 2 who was sent in by Dr. Rajan to get CT w/ contrast to further workup a suspicious iliac lesion found on MRI. Patient reports that he's had severe back pain w/ numbness radiating down his right leg x 2months. A week ago he went to Dr. Rajan, who ordered the MRI. When he wanted to follow it up w/ a CT w/ contrast, the pt's nephrologist asked that it be done as inpt under the supervision of a nephrologist. Pt denies fever, chills, chest pain, shortness of breath, abdominal pain.   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 Risks, benefits, alternatives  discussed in detail.

## 2018-10-24 NOTE — PROGRESS NOTE ADULT - SUBJECTIVE AND OBJECTIVE BOX
Patient is a 63y old  Male who presents with a chief complaint of Suspicious pelvic lesion on outpatient MRI (24 Oct 2018 12:58)      INTERVAL HPI/OVERNIGHT EVENTS: no acute events.  Patient seen and examined at bed side in [facility], denies any complaints.  Family at bed side.     MEDICATIONS  (STANDING):  atorvastatin 40 milliGRAM(s) Oral daily  dextrose 5%. 1000 milliLiter(s) (50 mL/Hr) IV Continuous <Continuous>  dextrose 50% Injectable 12.5 Gram(s) IV Push once  dextrose 50% Injectable 25 Gram(s) IV Push once  dextrose 50% Injectable 25 Gram(s) IV Push once  insulin lispro (HumaLOG) corrective regimen sliding scale   SubCutaneous three times a day before meals  insulin lispro (HumaLOG) corrective regimen sliding scale   SubCutaneous at bedtime  insulin lispro Injectable (HumaLOG) 5 Unit(s) SubCutaneous three times a day before meals  lamoTRIgine 100 milliGRAM(s) Oral two times a day  losartan 50 milliGRAM(s) Oral daily  magnesium oxide 400 milliGRAM(s) Oral daily  montelukast 10 milliGRAM(s) Oral daily  predniSONE   Tablet 45 milliGRAM(s) Oral daily  tamsulosin 0.4 milliGRAM(s) Oral at bedtime    MEDICATIONS  (PRN):  ALBUTerol/ipratropium for Nebulization 3 milliLiter(s) Nebulizer every 6 hours PRN Shortness of Breath and/or Wheezing  dextrose 40% Gel 15 Gram(s) Oral once PRN Blood Glucose LESS THAN 70 milliGRAM(s)/deciliter  diazepam    Tablet 5 milliGRAM(s) Oral every 8 hours PRN Anxiety or Muscle Spasm  glucagon  Injectable 1 milliGRAM(s) IntraMuscular once PRN Glucose LESS THAN 70 milligrams/deciliter  oxyCODONE    IR 10 milliGRAM(s) Oral every 4 hours PRN Severe Pain (7 - 10)  zolpidem 10 milliGRAM(s) Oral at bedtime PRN Insomnia      Allergies    penicillin (Unknown)    Intolerances      Vital Signs Last 24 Hrs  T(C): 37.4 (24 Oct 2018 12:05), Max: 37.4 (24 Oct 2018 12:05)  T(F): 99.3 (24 Oct 2018 12:05), Max: 99.3 (24 Oct 2018 12:05)  HR: 106 (24 Oct 2018 12:05) (99 - 106)  BP: 177/87 (24 Oct 2018 12:05) (120/90 - 177/87)  BP(mean): --  RR: 18 (24 Oct 2018 12:05) (18 - 19)  SpO2: 98% (24 Oct 2018 12:05) (95% - 98%)    PHYSICAL EXAM:  GENERAL: NAD, well-groomed, well-developed  HEAD:  Atraumatic, Normocephalic  EYES: EOMI, PERRLA, conjunctiva and sclera clear, periorbital puffiness  ENMT: Moist mucous membranes, Good dentition, No lesions  NECK: Supple, No JVD, Normal thyroid  NERVOUS SYSTEM:  Alert & Oriented X3, non focal  CHEST/LUNG: Clear to percussion bilaterally; No rales, rhonchi, wheezing, or rubs  HEART: Regular rate and rhythm; No murmurs, rubs, or gallops  ABDOMEN: Soft, Nontender, Nondistended; Bowel sounds present  EXTREMITIES:  2+ edema lower extremity    LABS:                        12.1   12.90 )-----------( 205      ( 24 Oct 2018 06:37 )             37.6     10-24    141  |  100  |  36<H>  ----------------------------<  122<H>  4.0   |  32<H>  |  1.39<H>    Ca    7.5<L>      24 Oct 2018 06:37  Phos  4.4     10-24  Mg     2.3     10-24    TPro  4.8<L>  /  Alb  1.3<L>  /  TBili  0.4  /  DBili  x   /  AST  29  /  ALT  30  /  AlkPhos  96  10-24    PT/INR - ( 24 Oct 2018 06:37 )   PT: 9.8 sec;   INR: 0.90 ratio         PTT - ( 24 Oct 2018 02:47 )  PTT:82.6 sec  Urinalysis Basic - ( 22 Oct 2018 21:18 )    Color: Yellow / Appearance: Clear / S.015 / pH: x  Gluc: x / Ketone: Negative  / Bili: Negative / Urobili: Negative mg/dL   Blood: x / Protein: 500 mg/dL / Nitrite: Negative   Leuk Esterase: Negative / RBC: 0-2 /HPF / WBC 0-2   Sq Epi: x / Non Sq Epi: Occasional / Bacteria: Few      CAPILLARY BLOOD GLUCOSE      POCT Blood Glucose.: 234 mg/dL (24 Oct 2018 11:51)  POCT Blood Glucose.: 157 mg/dL (24 Oct 2018 07:39)  POCT Blood Glucose.: 171 mg/dL (23 Oct 2018 21:02)  POCT Blood Glucose.: 117 mg/dL (23 Oct 2018 17:49)      Culture - Blood (collected 21 Oct 2018 20:57)  Source: .Blood Blood  Preliminary Report (22 Oct 2018 21:01):    No growth to date.    Culture - Blood (collected 21 Oct 2018 20:57)  Source: .Blood Blood  Preliminary Report (22 Oct 2018 21:01):    No growth to date.      RADIOLOGY & ADDITIONAL TESTS:    Imaging Personally Reviewed:  [ ] YES  [ ] NO    Consultant(s) Notes Reviewed:  [ ] YES  [ ] NO    Care Discussed with Consultants/Other Providers [ ] YES  [ ] NO

## 2018-10-24 NOTE — PROGRESS NOTE ADULT - SUBJECTIVE AND OBJECTIVE BOX
Gastroenterology  Patient seen and examined bedside resting comfortably.  No complaints offered.   No abdominal pain  Denies nausea and vomiting. Tolerating diet.  Normal flatus/BM. SEEN with wife and family at bedside     T(F): 99.3 (10-24-18 @ 12:05), Max: 99.3 (10-24-18 @ 12:05)  HR: 106 (10-24-18 @ 12:05) (99 - 106)  BP: 177/87 (10-24-18 @ 12:05) (120/90 - 177/87)  RR: 18 (10-24-18 @ 12:05) (18 - 19)  SpO2: 98% (10-24-18 @ 12:05) (95% - 98%)  Wt(kg): --  CAPILLARY BLOOD GLUCOSE      POCT Blood Glucose.: 234 mg/dL (24 Oct 2018 11:51)  POCT Blood Glucose.: 157 mg/dL (24 Oct 2018 07:39)  POCT Blood Glucose.: 171 mg/dL (23 Oct 2018 21:02)  POCT Blood Glucose.: 117 mg/dL (23 Oct 2018 17:49)      PHYSICAL EXAM:  General: NAD, WDWN.   Neuro:  Alert & oriented x 3  HEENT: NCAT, EOMI, conjunctiva clear  CV: +S1+S2 regular rate and rhythm  Lung: clear to ausculation bilaterally, respirations nonlabored, good inspiratory effort  Abdomen: soft, Non Tender, Obese  No distention Normal active BS  Extremities: no cyanosis, clubbing or edema    LABS:                        12.1   12.90 )-----------( 205      ( 24 Oct 2018 06:37 )             37.6     10-24    141  |  100  |  36<H>  ----------------------------<  122<H>  4.0   |  32<H>  |  1.39<H>    Ca    7.5<L>      24 Oct 2018 06:37  Phos  4.4     10-24  Mg     2.3     10-24    TPro  4.8<L>  /  Alb  1.3<L>  /  TBili  0.4  /  DBili  x   /  AST  29  /  ALT  30  /  AlkPhos  96  10-24    LIVER FUNCTIONS - ( 24 Oct 2018 06:37 )  Alb: 1.3 g/dL / Pro: 4.8 gm/dL / ALK PHOS: 96 U/L / ALT: 30 U/L / AST: 29 U/L / GGT: x           PT/INR - ( 24 Oct 2018 06:37 )   PT: 9.8 sec;   INR: 0.90 ratio         PTT - ( 24 Oct 2018 02:47 )  PTT:82.6 sec  I&O's Detail    23 Oct 2018 07:01  -  24 Oct 2018 07:00  --------------------------------------------------------  IN:    heparin  Infusion.: 120 mL    Oral Fluid: 540 mL  Total IN: 660 mL    OUT:  Total OUT: 0 mL    Total NET: 660 mL

## 2018-10-24 NOTE — PROGRESS NOTE ADULT - ASSESSMENT
Patient is a 63 year old male with PMH prediabetes, HTN, COPD, nephrotic syndrome on prednisone that is admitted for abnormal MRI    Acute pulmonary embolism without acute cor pulmonale  -patient was started on heparin drip which now hold for biopsy  -s/p iliac bone biopsy today  -as per IR note, resume heparin drip tonight at 10 PM(10/24/2018)  -discussed with type of anticoagulations, patient prefers Apixaban  -TTE done which showed LVEF 60-65%, Grade I diastolic dysfunction). Normal right ventricular size and function.   Trivial pericardial effusion, Trace TR    Pelvic mass  -R/O Metastatic disease. s/p biopsy today  -follow the pathology    Nephrotic syndrome  - planning to taper steroids slowly, will decrease prednisone by 5 mg every 3 days.    - Nephrology consult appreciated/noted  -plan to Increase diuretic regimen; increase ARB for antiproteinuric effect;   -renal follow up       Chronic bilateral low back pain without sciatica.  Plan: - transfer off Ortho service to Medicine  - prn follow up by Ortho, reccs appreciated.     Centrilobular emphysema  -c/w Spiriva daily  - pulm reccs appreciated.    Gastritis without bleeding, unspecified chronicity, unspecified gastritis type  -GI consult appreciated, continue PPI  -GI recommended EGD after pulmonary work up.     IPMN (intraductal papillary mucinous neoplasm).  Plan: CA 19-9 normal -Bone lesion BX pending for primary path.   Eventual endoscopic evaluation Patient is a 63 year old male with PMH prediabetes, HTN, COPD, nephrotic syndrome on prednisone that is admitted for abnormal MRI    Acute pulmonary embolism without acute cor pulmonale  -patient was started on heparin drip which now hold for biopsy  -s/p iliac bone biopsy today  -as per IR note, resume heparin drip tonight at 10 PM(10/24/2018)  -discussed with type of anticoagulations, patient prefers Apixaban, will d/c on Apixaban   -TTE done which showed LVEF 60-65%, Grade I diastolic dysfunction). Normal right ventricular size and function.   Trivial pericardial effusion, Trace TR    Pelvic mass  -R/O Metastatic disease. s/p biopsy today  -follow the pathology    Nephrotic syndrome  - planning to taper steroids slowly, will decrease prednisone by 5 mg every 3 days.    - Nephrology consult appreciated/noted  -plan to Increase diuretic regimen; increase ARB for antiproteinuric effect;   -renal follow up appreciated, as per renal hold diuretics for now as creatinine is rising  -recommended outpatient f/u      Chronic bilateral low back pain without sciatica.  Plan: - transfer off Ortho service to Medicine  - prn follow up by Ortho, reccs appreciated.     Centrilobular emphysema  -c/w Spiriva daily  - pulm reccs appreciated.    Gastritis without bleeding, unspecified chronicity, unspecified gastritis type  -GI consult appreciated, continue PPI  -GI recommended EGD after pulmonary work up.     IPMN (intraductal papillary mucinous neoplasm).  Plan: CA 19-9 normal  --Bone lesion BX pending for primary path.   -Eventual endoscopic evaluation per GI

## 2018-10-24 NOTE — PROGRESS NOTE ADULT - SUBJECTIVE AND OBJECTIVE BOX
Patient feels well no complaints today. less edema    MEDICATIONS  (STANDING):  atorvastatin 40 milliGRAM(s) Oral daily  dextrose 5%. 1000 milliLiter(s) (50 mL/Hr) IV Continuous <Continuous>  dextrose 50% Injectable 12.5 Gram(s) IV Push once  dextrose 50% Injectable 25 Gram(s) IV Push once  dextrose 50% Injectable 25 Gram(s) IV Push once  insulin lispro (HumaLOG) corrective regimen sliding scale   SubCutaneous three times a day before meals  insulin lispro (HumaLOG) corrective regimen sliding scale   SubCutaneous at bedtime  insulin lispro Injectable (HumaLOG) 5 Unit(s) SubCutaneous three times a day before meals  lamoTRIgine 100 milliGRAM(s) Oral two times a day  losartan 50 milliGRAM(s) Oral daily  magnesium oxide 400 milliGRAM(s) Oral daily  montelukast 10 milliGRAM(s) Oral daily  predniSONE   Tablet 45 milliGRAM(s) Oral daily  tamsulosin 0.4 milliGRAM(s) Oral at bedtime    MEDICATIONS  (PRN):  ALBUTerol/ipratropium for Nebulization 3 milliLiter(s) Nebulizer every 6 hours PRN Shortness of Breath and/or Wheezing  dextrose 40% Gel 15 Gram(s) Oral once PRN Blood Glucose LESS THAN 70 milliGRAM(s)/deciliter  diazepam    Tablet 5 milliGRAM(s) Oral every 8 hours PRN Anxiety or Muscle Spasm  glucagon  Injectable 1 milliGRAM(s) IntraMuscular once PRN Glucose LESS THAN 70 milligrams/deciliter  oxyCODONE    IR 10 milliGRAM(s) Oral every 4 hours PRN Severe Pain (7 - 10)  zolpidem 10 milliGRAM(s) Oral at bedtime PRN Insomnia      10-23-18 @ 07:01  -  10-24-18 @ 07:00  --------------------------------------------------------  IN: 660 mL / OUT: 0 mL / NET: 660 mL    10-24-18 @ 07:01  -  10-24-18 @ 14:40  --------------------------------------------------------  IN: 240 mL / OUT: 0 mL / NET: 240 mL      PHYSICAL EXAM:      T(C): 37.4 (10-24-18 @ 12:05), Max: 37.4 (10-24-18 @ 12:05)  HR: 106 (10-24-18 @ 12:05) (99 - 106)  BP: 177/87 (10-24-18 @ 12:05) (120/90 - 177/87)  RR: 18 (10-24-18 @ 12:05) (18 - 19)  SpO2: 98% (10-24-18 @ 12:05) (95% - 98%)  Wt(kg): --  Respiratory: clear anteriorly, decreased BS at bases  Cardiovascular: S1 S2  Gastrointestinal: soft NT ND +BS  Extremities:   1-2  edema                                    12.1   12.90 )-----------( 205      ( 24 Oct 2018 06:37 )             37.6     10-24    141  |  100  |  36<H>  ----------------------------<  122<H>  4.0   |  32<H>  |  1.39<H>    Ca    7.5<L>      24 Oct 2018 06:37  Phos  4.4     10-24  Mg     2.3     10-24    TPro  4.8<L>  /  Alb  1.3<L>  /  TBili  0.4  /  DBili  x   /  AST  29  /  ALT  30  /  AlkPhos  96  10-24      LIVER FUNCTIONS - ( 24 Oct 2018 06:37 )  Alb: 1.3 g/dL / Pro: 4.8 gm/dL / ALK PHOS: 96 U/L / ALT: 30 U/L / AST: 29 U/L / GGT: x             Assessment and Plan:    FSGS, TIP variant,  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  Will hold diuretic regimen for now as creatinine increased trend;   No objection to discharge provided he follows with his nephrologist 1-2 days.

## 2018-10-25 ENCOUNTER — TRANSCRIPTION ENCOUNTER (OUTPATIENT)
Age: 63
End: 2018-10-25

## 2018-10-25 VITALS
TEMPERATURE: 98 F | DIASTOLIC BLOOD PRESSURE: 93 MMHG | WEIGHT: 171.3 LBS | RESPIRATION RATE: 18 BRPM | SYSTOLIC BLOOD PRESSURE: 136 MMHG | OXYGEN SATURATION: 97 % | HEART RATE: 99 BPM

## 2018-10-25 LAB
ANION GAP SERPL CALC-SCNC: 7 MMOL/L — SIGNIFICANT CHANGE UP (ref 5–17)
APTT BLD: 134.3 SEC — CRITICAL HIGH (ref 27.5–37.4)
BUN SERPL-MCNC: 30 MG/DL — HIGH (ref 7–23)
CALCIUM SERPL-MCNC: 7.7 MG/DL — LOW (ref 8.5–10.1)
CHLORIDE SERPL-SCNC: 103 MMOL/L — SIGNIFICANT CHANGE UP (ref 96–108)
CO2 SERPL-SCNC: 31 MMOL/L — SIGNIFICANT CHANGE UP (ref 22–31)
CREAT SERPL-MCNC: 1.28 MG/DL — SIGNIFICANT CHANGE UP (ref 0.5–1.3)
GLUCOSE SERPL-MCNC: 142 MG/DL — HIGH (ref 70–99)
HCT VFR BLD CALC: 38.5 % — LOW (ref 39–50)
HGB BLD-MCNC: 12.5 G/DL — LOW (ref 13–17)
MCHC RBC-ENTMCNC: 28.7 PG — SIGNIFICANT CHANGE UP (ref 27–34)
MCHC RBC-ENTMCNC: 32.5 GM/DL — SIGNIFICANT CHANGE UP (ref 32–36)
MCV RBC AUTO: 88.3 FL — SIGNIFICANT CHANGE UP (ref 80–100)
NRBC # BLD: 0 /100 WBCS — SIGNIFICANT CHANGE UP (ref 0–0)
PLATELET # BLD AUTO: 175 K/UL — SIGNIFICANT CHANGE UP (ref 150–400)
POTASSIUM SERPL-MCNC: 4.2 MMOL/L — SIGNIFICANT CHANGE UP (ref 3.5–5.3)
POTASSIUM SERPL-SCNC: 4.2 MMOL/L — SIGNIFICANT CHANGE UP (ref 3.5–5.3)
RBC # BLD: 4.36 M/UL — SIGNIFICANT CHANGE UP (ref 4.2–5.8)
RBC # FLD: 16.4 % — HIGH (ref 10.3–14.5)
SODIUM SERPL-SCNC: 141 MMOL/L — SIGNIFICANT CHANGE UP (ref 135–145)
WBC # BLD: 12.99 K/UL — HIGH (ref 3.8–10.5)
WBC # FLD AUTO: 12.99 K/UL — HIGH (ref 3.8–10.5)

## 2018-10-25 PROCEDURE — 99239 HOSP IP/OBS DSCHRG MGMT >30: CPT

## 2018-10-25 RX ORDER — APIXABAN 2.5 MG/1
10 TABLET, FILM COATED ORAL
Qty: 0 | Refills: 0 | Status: DISCONTINUED | OUTPATIENT
Start: 2018-10-25 | End: 2018-10-25

## 2018-10-25 RX ORDER — APIXABAN 2.5 MG/1
1 TABLET, FILM COATED ORAL
Qty: 0 | Refills: 0 | COMMUNITY
Start: 2018-10-25 | End: 2018-11-07

## 2018-10-25 RX ORDER — METFORMIN HYDROCHLORIDE 850 MG/1
1 TABLET ORAL
Qty: 60 | Refills: 0 | OUTPATIENT
Start: 2018-10-25 | End: 2018-11-23

## 2018-10-25 RX ORDER — OXYCODONE HYDROCHLORIDE 5 MG/1
1 TABLET ORAL
Qty: 12 | Refills: 0 | OUTPATIENT
Start: 2018-10-25 | End: 2018-10-27

## 2018-10-25 RX ORDER — APIXABAN 2.5 MG/1
10 TABLET, FILM COATED ORAL EVERY 12 HOURS
Qty: 0 | Refills: 0 | Status: DISCONTINUED | OUTPATIENT
Start: 2018-10-25 | End: 2018-10-25

## 2018-10-25 RX ORDER — APIXABAN 2.5 MG/1
2 TABLET, FILM COATED ORAL
Qty: 56 | Refills: 0 | OUTPATIENT
Start: 2018-10-25 | End: 2018-11-07

## 2018-10-25 RX ORDER — DIAZEPAM 5 MG
1 TABLET ORAL
Qty: 9 | Refills: 0 | OUTPATIENT
Start: 2018-10-25 | End: 2018-10-27

## 2018-10-25 RX ADMIN — MAGNESIUM OXIDE 400 MG ORAL TABLET 400 MILLIGRAM(S): 241.3 TABLET ORAL at 11:20

## 2018-10-25 RX ADMIN — MONTELUKAST 10 MILLIGRAM(S): 4 TABLET, CHEWABLE ORAL at 11:19

## 2018-10-25 RX ADMIN — OXYCODONE HYDROCHLORIDE 10 MILLIGRAM(S): 5 TABLET ORAL at 09:03

## 2018-10-25 RX ADMIN — OXYCODONE HYDROCHLORIDE 10 MILLIGRAM(S): 5 TABLET ORAL at 08:52

## 2018-10-25 RX ADMIN — LAMOTRIGINE 100 MILLIGRAM(S): 25 TABLET, ORALLY DISINTEGRATING ORAL at 05:50

## 2018-10-25 RX ADMIN — HEPARIN SODIUM 1500 UNIT(S)/HR: 5000 INJECTION INTRAVENOUS; SUBCUTANEOUS at 00:22

## 2018-10-25 RX ADMIN — Medication 5 UNIT(S): at 11:20

## 2018-10-25 RX ADMIN — LOSARTAN POTASSIUM 50 MILLIGRAM(S): 100 TABLET, FILM COATED ORAL at 05:50

## 2018-10-25 RX ADMIN — HEPARIN SODIUM 1200 UNIT(S)/HR: 5000 INJECTION INTRAVENOUS; SUBCUTANEOUS at 09:22

## 2018-10-25 RX ADMIN — Medication 5 MILLIGRAM(S): at 11:45

## 2018-10-25 RX ADMIN — HEPARIN SODIUM 0 UNIT(S)/HR: 5000 INJECTION INTRAVENOUS; SUBCUTANEOUS at 08:20

## 2018-10-25 RX ADMIN — Medication 5 UNIT(S): at 08:21

## 2018-10-25 RX ADMIN — APIXABAN 10 MILLIGRAM(S): 2.5 TABLET, FILM COATED ORAL at 11:59

## 2018-10-25 RX ADMIN — Medication 2: at 08:21

## 2018-10-25 RX ADMIN — Medication 45 MILLIGRAM(S): at 05:50

## 2018-10-25 NOTE — PROGRESS NOTE ADULT - PROBLEM SELECTOR PROBLEM 1
R/O Metastatic disease
R/O Metastatic disease
Gastritis without bleeding, unspecified chronicity, unspecified gastritis type
Gastritis without bleeding, unspecified chronicity, unspecified gastritis type
Other acute pulmonary embolism without acute cor pulmonale

## 2018-10-25 NOTE — DISCHARGE NOTE ADULT - SECONDARY DIAGNOSIS.
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

## 2018-10-25 NOTE — PROGRESS NOTE ADULT - PROBLEM SELECTOR PROBLEM 2
COPD (chronic obstructive pulmonary disease)
IPMN (intraductal papillary mucinous neoplasm)
IPMN (intraductal papillary mucinous neoplasm)
Renal artery embolism

## 2018-10-25 NOTE — PROGRESS NOTE ADULT - SUBJECTIVE AND OBJECTIVE BOX
Subjective: unset about Dx of advanced malignancy.       MEDICATIONS  (STANDING):  apixaban 10 milliGRAM(s) Oral <User Schedule>  atorvastatin 40 milliGRAM(s) Oral daily  dextrose 5%. 1000 milliLiter(s) (50 mL/Hr) IV Continuous <Continuous>  dextrose 50% Injectable 12.5 Gram(s) IV Push once  dextrose 50% Injectable 25 Gram(s) IV Push once  dextrose 50% Injectable 25 Gram(s) IV Push once  insulin lispro (HumaLOG) corrective regimen sliding scale   SubCutaneous three times a day before meals  insulin lispro (HumaLOG) corrective regimen sliding scale   SubCutaneous at bedtime  insulin lispro Injectable (HumaLOG) 5 Unit(s) SubCutaneous three times a day before meals  lamoTRIgine 100 milliGRAM(s) Oral two times a day  losartan 50 milliGRAM(s) Oral daily  magnesium oxide 400 milliGRAM(s) Oral daily  montelukast 10 milliGRAM(s) Oral daily  predniSONE   Tablet 45 milliGRAM(s) Oral daily  tamsulosin 0.4 milliGRAM(s) Oral at bedtime    MEDICATIONS  (PRN):  ALBUTerol/ipratropium for Nebulization 3 milliLiter(s) Nebulizer every 6 hours PRN Shortness of Breath and/or Wheezing  dextrose 40% Gel 15 Gram(s) Oral once PRN Blood Glucose LESS THAN 70 milliGRAM(s)/deciliter  diazepam    Tablet 5 milliGRAM(s) Oral every 8 hours PRN Anxiety or Muscle Spasm  glucagon  Injectable 1 milliGRAM(s) IntraMuscular once PRN Glucose LESS THAN 70 milligrams/deciliter  oxyCODONE    IR 10 milliGRAM(s) Oral every 4 hours PRN Severe Pain (7 - 10)          T(C): 36.8 (10-25-18 @ 06:20), Max: 36.8 (10-25-18 @ 06:20)  HR: 99 (10-25-18 @ 06:20) (95 - 99)  BP: 136/93 (10-25-18 @ 06:20) (114/63 - 144/85)  RR: 18 (10-25-18 @ 06:20) (16 - 18)  SpO2: 97% (10-25-18 @ 06:20) (96% - 97%)  Wt(kg): --        I&O's Detail    24 Oct 2018 07:01  -  25 Oct 2018 07:00  --------------------------------------------------------  IN:    Oral Fluid: 480 mL  Total IN: 480 mL    OUT:  Total OUT: 0 mL    Total NET: 480 mL               PHYSICAL EXAM:    GENERAL:   EYES: periorbital edema.  NECK: Supple, no inc in JVP  CHEST/LUNG: Clear  HEART: S1S2  ABDOMEN: Soft, Nontender, Nondistended; Bowel sounds present  EXTREMITIES:  1+ edema  NEURO: no asterixis      LABS:  CBC Full  -  ( 25 Oct 2018 06:25 )  WBC Count : 12.99 K/uL  Hemoglobin : 12.5 g/dL  Hematocrit : 38.5 %  Platelet Count - Automated : 175 K/uL  Mean Cell Volume : 88.3 fl  Mean Cell Hemoglobin : 28.7 pg  Mean Cell Hemoglobin Concentration : 32.5 gm/dL  Auto Neutrophil # : x  Auto Lymphocyte # : x  Auto Monocyte # : x  Auto Eosinophil # : x  Auto Basophil # : x  Auto Neutrophil % : x  Auto Lymphocyte % : x  Auto Monocyte % : x  Auto Eosinophil % : x  Auto Basophil % : x    10-25    141  |  103  |  30<H>  ----------------------------<  142<H>  4.2   |  31  |  1.28    Ca    7.7<L>      25 Oct 2018 06:27  Phos  4.4     10-24  Mg     2.3     10-24    TPro  4.8<L>  /  Alb  1.3<L>  /  TBili  0.4  /  DBili  x   /  AST  29  /  ALT  30  /  AlkPhos  96  10-24    PT/INR - ( 24 Oct 2018 06:37 )   PT: 9.8 sec;   INR: 0.90 ratio         PTT - ( 25 Oct 2018 06:25 )  PTT:134.3 sec        Impression:  * Nephrosis. FSGS, tip variant  * Met dz, unknown primary  * B/L PE, mediastinal adenopathy. On Eliquis  * ? RVT. On Eliquis  * S/p CT guided Bx of L iliac lesion  Recommendations:   Resume ARB, maintenance diuretic  Follow Cr while remains hospitalized  Outpt f/u with primary nephrologist

## 2018-10-25 NOTE — PROGRESS NOTE ADULT - PROBLEM SELECTOR PLAN 3
Anticoagulation.
- patient will need adrenal bx, as well as bx of the iliac crest  - will follow with Pulm and CTS for these diagnostic tests  - GI eval appreciated, recommends Endoscopic evaluation in the future  - Hematology/Oncology eval in am

## 2018-10-25 NOTE — DISCHARGE NOTE ADULT - CARE PLAN
Principal Discharge DX:	Metastatic disease  Goal:	Follow up with Oncology  Assessment and plan of treatment:	1.  Follow up with Oncology in 1 week for final pathology report  Secondary Diagnosis:	Other acute pulmonary embolism without acute cor pulmonale  Goal:	follow up with Pulmonology  Assessment and plan of treatment:	1.  Start on Eliquis  Secondary Diagnosis:	Renal artery embolism  Assessment and plan of treatment:	1. Continue with oral anticoagulation  2. Follow up with Nephrology as o/p  Secondary Diagnosis:	COPD (chronic obstructive pulmonary disease)  Assessment and plan of treatment:	1.  Take all medications as prescribed.  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  Taper steroids by 5 mg every 4 days.  2.  Outpatient follow up with Nephrology  Secondary Diagnosis:	Gastritis without bleeding, unspecified chronicity, unspecified gastritis type  Assessment and plan of treatment:	1.  Outpatient follow up with Gastroenterology

## 2018-10-25 NOTE — DISCHARGE NOTE ADULT - PROVIDER TOKENS
FREE:[LAST:[ankit],FIRST:[Rhona],PHONE:[phone]],FAX:[(   )    -],ADDRESS:[[facility]]],FREE:[LAST:[jr],PHONE:[phone]],FAX:[(   )    -]]

## 2018-10-25 NOTE — DISCHARGE NOTE ADULT - PLAN OF CARE
Follow up with Oncology 1.  Follow up with Oncology in 1 week for final pathology report follow up with Pulmonology 1.  Start on Eliquis 1. Continue with oral anticoagulation  2. Follow up with Nephrology as o/p 1.  Take all medications as prescribed. 1.  Taper steroids by 5 mg every 4 days.  2.  Outpatient follow up with Nephrology 1.  Outpatient follow up with Gastroenterology

## 2018-10-25 NOTE — PROGRESS NOTE ADULT - SUBJECTIVE AND OBJECTIVE BOX
INTERVAL History:  Cough.  Edema  Bone Pain.  Allergies    penicillin (Unknown)    Intolerances        MEDICATIONS  (STANDING):  apixaban 10 milliGRAM(s) Oral <User Schedule>  atorvastatin 40 milliGRAM(s) Oral daily  dextrose 5%. 1000 milliLiter(s) (50 mL/Hr) IV Continuous <Continuous>  dextrose 50% Injectable 12.5 Gram(s) IV Push once  dextrose 50% Injectable 25 Gram(s) IV Push once  dextrose 50% Injectable 25 Gram(s) IV Push once  insulin lispro (HumaLOG) corrective regimen sliding scale   SubCutaneous three times a day before meals  insulin lispro (HumaLOG) corrective regimen sliding scale   SubCutaneous at bedtime  insulin lispro Injectable (HumaLOG) 5 Unit(s) SubCutaneous three times a day before meals  lamoTRIgine 100 milliGRAM(s) Oral two times a day  losartan 50 milliGRAM(s) Oral daily  magnesium oxide 400 milliGRAM(s) Oral daily  montelukast 10 milliGRAM(s) Oral daily  predniSONE   Tablet 45 milliGRAM(s) Oral daily  tamsulosin 0.4 milliGRAM(s) Oral at bedtime    MEDICATIONS  (PRN):  ALBUTerol/ipratropium for Nebulization 3 milliLiter(s) Nebulizer every 6 hours PRN Shortness of Breath and/or Wheezing  dextrose 40% Gel 15 Gram(s) Oral once PRN Blood Glucose LESS THAN 70 milliGRAM(s)/deciliter  diazepam    Tablet 5 milliGRAM(s) Oral every 8 hours PRN Anxiety or Muscle Spasm  glucagon  Injectable 1 milliGRAM(s) IntraMuscular once PRN Glucose LESS THAN 70 milligrams/deciliter  oxyCODONE    IR 10 milliGRAM(s) Oral every 4 hours PRN Severe Pain (7 - 10)      Vital Signs Last 24 Hrs  T(C): 36.8 (25 Oct 2018 06:20), Max: 37.4 (24 Oct 2018 12:05)  T(F): 98.2 (25 Oct 2018 06:20), Max: 99.3 (24 Oct 2018 12:05)  HR: 99 (25 Oct 2018 06:20) (95 - 106)  BP: 136/93 (25 Oct 2018 06:20) (114/63 - 177/87)  BP(mean): --  RR: 18 (25 Oct 2018 06:20) (16 - 19)  SpO2: 97% (25 Oct 2018 06:20) (96% - 98%)    PHYSICAL EXAM:      EYES: EOMI, PERRLA, conjunctiva and sclera clear  NECK: Supple, No JVD, Normal thyroid  CHEST/LUNG:  rales, rhonchi,   HEART: Regular rate and rhythm;   ABDOMEN: Soft, Nontender.  LYMPH: No lymphadenopathy noted  Edema:- ++        LABS:                        12.5   12.99 )-----------( 175      ( 25 Oct 2018 06:25 )             38.5     10-25    141  |  103  |  30<H>  ----------------------------<  142<H>  4.2   |  31  |  1.28    Ca    7.7<L>      25 Oct 2018 06:27  Phos  4.4     10-24  Mg     2.3     10-24    TPro  4.8<L>  /  Alb  1.3<L>  /  TBili  0.4  /  DBili  x   /  AST  29  /  ALT  30  /  AlkPhos  96  10-24    PT/INR - ( 24 Oct 2018 06:37 )   PT: 9.8 sec;   INR: 0.90 ratio         PTT - ( 25 Oct 2018 06:25 )  PTT:134.3 sec        RADIOLOGY & ADDITIONAL STUDIES:    PATHOLOGY:

## 2018-10-25 NOTE — DISCHARGE NOTE ADULT - CARE PROVIDER_API CALL
Rhona pham  [facility]  Phone: [phone]  Fax: (   )    -    jr,   Phone: [phone]  Fax: (   )    -

## 2018-10-25 NOTE — DISCHARGE NOTE ADULT - MEDICATION SUMMARY - MEDICATIONS TO STOP TAKING
I will STOP taking the medications listed below when I get home from the hospital:    Demadex 20 mg oral tablet  -- 1 tab(s) by mouth once a day

## 2018-10-25 NOTE — DISCHARGE NOTE ADULT - PATIENT PORTAL LINK FT
You can access the [facility] Patient Portal, offered by [facility], by registering with the following website: [email or web address]

## 2018-10-25 NOTE — PROGRESS NOTE ADULT - REASON FOR ADMISSION
Suspicious pelvic lesion on outpatient MRI

## 2018-10-25 NOTE — DISCHARGE NOTE ADULT - HOSPITAL COURSE
Patient is a 63y old  Male who presents with a chief complaint of Suspicious pelvic lesion on outpatient MRI (25 Oct 2018 12:46)        HPI:  Patient is a 63 year old male with PMH prediabetes, HTN, COPD, nephrotic syndrome on prednisone, who was sent in by Dr. Rajan to further workup a suspicious iliac lesion found on MRI. Patient reports chronic back pain requiring spinal fusions x 2, but states that he's had new, worsening right lower back pain that radiates down his leg for several weeks. Takes oxy 10 at home with temporary relief of pain. Ambulates without difficulty.   Denies fever, chills, chest pain, shortness of breath, abdominal pain. (21 Oct 2018 17:04)  SUBJECTIVE & OBJECTIVE: Pt seen and examined at bedside.     PHYSICAL EXAM:  T(C): 36.8 (10-25-18 @ 06:20), Max: 36.8 (10-25-18 @ 06:20)  HR: 99 (10-25-18 @ 06:20) (95 - 99)  BP: 136/93 (10-25-18 @ 06:20) (114/63 - 144/85)  RR: 18 (10-25-18 @ 06:20) (16 - 18)  SpO2: 97% (10-25-18 @ 06:20) (96% - 97%)  GENERAL: NAD, well-groomed, well-developed  HEAD:  Atraumatic, Normocephalic  EYES: EOMI, PERRLA, conjunctiva and sclera clear  ENMT: Moist mucous membranes  NECK: Supple, No JVD  NERVOUS SYSTEM:  Alert & Oriented X3, Motor Strength 5/5 B/L upper and lower extremities; DTRs 2+ intact and symmetric  CHEST/LUNG: Clear to auscultation bilaterally; No rales, rhonchi, wheezing, or rubs  HEART: Regular rate and rhythm; No murmurs, rubs, or gallops  ABDOMEN: Soft, Nontender, Nondistended; Bowel sounds present  EXTREMITIES:  2+ Peripheral Pulses, No clubbing, cyanosis, or edema    MEDICATIONS  (STANDING):  apixaban 10 milliGRAM(s) Oral <User Schedule>  atorvastatin 40 milliGRAM(s) Oral daily  lamoTRIgine 100 milliGRAM(s) Oral two times a day  losartan 50 milliGRAM(s) Oral daily  magnesium oxide 400 milliGRAM(s) Oral daily  montelukast 10 milliGRAM(s) Oral daily  predniSONE   Tablet 45 milliGRAM(s) Oral daily  tamsulosin 0.4 milliGRAM(s) Oral at bedtime    LABS:                        12.5   12.99 )-----------( 175      ( 25 Oct 2018 06:25 )             38.5     10-25    141  |  103  |  30<H>  ----------------------------<  142<H>  4.2   |  31  |  1.28    Ca    7.7<L>      25 Oct 2018 06:27  Phos  4.4     10-24  Mg     2.3     10-24  TPro  4.8<L>  /  Alb  1.3<L>  /  TBili  0.4  /  DBili  x   /  AST  29  /  ALT  30  /  AlkPhos  96  10-24  PT/INR - ( 24 Oct 2018 06:37 )   PT: 9.8 sec;   INR: 0.90 ratio    PTT - ( 25 Oct 2018 06:25 )  PTT:134.3 sec  CAPILLARY BLOOD GLUCOSE  POCT Blood Glucose.: 139 mg/dL (25 Oct 2018 11:00)  POCT Blood Glucose.: 155 mg/dL (25 Oct 2018 07:51)  POCT Blood Glucose.: 128 mg/dL (24 Oct 2018 23:09)  POCT Blood Glucose.: 148 mg/dL (24 Oct 2018 16:25)  RECENT CULTURES:  Urine culture:  10-21 @ 20:57 --   No growth to date.  RADIOLOGY & ADDITIONAL TESTS:  < from: CT Abdomen and Pelvis w/ IV Cont (10.21.18 @ 18:52) >  IMPRESSION:  1. Bilateral pulmonary emboli.  2. Small left pleural effusion.  3. Right ninth rib lytic lesion with suspected pathologic fracture.  4. Mediastinal and hilar adenopathy.  5. Gastric wall thickening, question gastritis.  6. Cystic pancreatic neck lesion (6 mm). A one-year follow-up MRI is   recommended to assess stability  7. Bilateral renal vein filling defects, suspicious for renal vein   thrombosis, or less likely mixing artifact. The right renal vein filling   defect partially extends into the inferior vena cava.  8. Left iliac wing lytic lesion, suspicious for metastasis.  9. Indeterminate nodular adrenal thickening.    < end of copied text >  DVT/GI ppx  Discussed with pt @ bedside Patient is a 63y old  Male who presents with a chief complaint of Suspicious pelvic lesion on outpatient MRI (25 Oct 2018 12:46)  HPI:Patient is a 63 year old male with PMH prediabetes, HTN, COPD, nephrotic syndrome on prednisone, who was sent in by Dr. Rajan to further workup a suspicious iliac lesion found on MRI. Patient reports chronic back pain requiring spinal fusions x 2, but states that he's had new, worsening right lower back pain that radiates down his leg for several weeks. Takes oxy 10 at home with temporary relief of pain. Ambulates without difficulty.   Denies fever, chills, chest pain, shortness of breath, abdominal pain. (21 Oct 2018 17:04)  HOSPITAL COURSE: Patient had IV contrasted CT scan of chest, abdomen, and pelvis which demonstrated mediastinal adenopathy, and pathologic lesion/fracture of the 9th rib and left iliac crest.  Patient was also found to have adrenal nodularity.  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  Patient was therapeutically anticoagulated with heparin and consults from hematology, pulmonology, and nephrology were obtained.  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  Bone biopsy of the left iliac crest was obtained on day 3 of his hospital stay.  Preliminary results of the biopsy are positive for metastatic (neoplastic) cells.  Patient was screened for IPMN with Ca19-9, the results of which were negative.  CEA was elevated however and gastric thickening was apparent on CT scan.  He was evaluated by GI with recommendations for outpatient follow up.  During his hospital stay, the patient was treated with oral diuretics with resultant increasing in serum creatinine.  Diuretics were discontinued and patient will be tapered off steroid therapy gradually.  Patient was informed of all results and is doing well at time of discharge.       PHYSICAL EXAM:  T(C): 36.8 (10-25-18 @ 06:20), Max: 36.8 (10-25-18 @ 06:20)  HR: 99 (10-25-18 @ 06:20) (95 - 99)  BP: 136/93 (10-25-18 @ 06:20) (114/63 - 144/85)  RR: 18 (10-25-18 @ 06:20) (16 - 18)  SpO2: 97% (10-25-18 @ 06:20) (96% - 97%)  GENERAL: NAD, well-groomed, well-developed  HEAD:  Atraumatic, Normocephalic  EYES: EOMI, PERRLA, conjunctiva and sclera clear  ENMT: Moist mucous membranes  NECK: Supple, No JVD  NERVOUS SYSTEM:  Alert & Oriented X3, Motor Strength 5/5 B/L upper and lower extremities; DTRs 2+ intact and symmetric  CHEST/LUNG: Clear to auscultation bilaterally; No rales, rhonchi, wheezing, or rubs  HEART: Regular rate and rhythm; No murmurs, rubs, or gallops  ABDOMEN: Soft, Nontender, Nondistended; Bowel sounds present  EXTREMITIES:  2+ Peripheral Pulses, No clubbing, cyanosis, or edema    MEDICATIONS  (STANDING):  apixaban 10 milliGRAM(s) Oral <User Schedule>  atorvastatin 40 milliGRAM(s) Oral daily  lamoTRIgine 100 milliGRAM(s) Oral two times a day  losartan 50 milliGRAM(s) Oral daily  magnesium oxide 400 milliGRAM(s) Oral daily  montelukast 10 milliGRAM(s) Oral daily  predniSONE   Tablet 45 milliGRAM(s) Oral daily  tamsulosin 0.4 milliGRAM(s) Oral at bedtime    LABS:                        12.5   12.99 )-----------( 175      ( 25 Oct 2018 06:25 )             38.5     10-25    141  |  103  |  30<H>  ----------------------------<  142<H>  4.2   |  31  |  1.28    Ca    7.7<L>      25 Oct 2018 06:27  Phos  4.4     10-24  Mg     2.3     10-24  TPro  4.8<L>  /  Alb  1.3<L>  /  TBili  0.4  /  DBili  x   /  AST  29  /  ALT  30  /  AlkPhos  96  10-24  PT/INR - ( 24 Oct 2018 06:37 )   PT: 9.8 sec;   INR: 0.90 ratio    PTT - ( 25 Oct 2018 06:25 )  PTT:134.3 sec  CAPILLARY BLOOD GLUCOSE  POCT Blood Glucose.: 139 mg/dL (25 Oct 2018 11:00)  POCT Blood Glucose.: 155 mg/dL (25 Oct 2018 07:51)  POCT Blood Glucose.: 128 mg/dL (24 Oct 2018 23:09)  POCT Blood Glucose.: 148 mg/dL (24 Oct 2018 16:25)  RECENT CULTURES:  Urine culture:  10-21 @ 20:57 --   No growth to date.  RADIOLOGY & ADDITIONAL TESTS:  < from: CT Abdomen and Pelvis w/ IV Cont (10.21.18 @ 18:52) >  IMPRESSION:  1. Bilateral pulmonary emboli.  2. Small left pleural effusion.  3. Right ninth rib lytic lesion with suspected pathologic fracture.  4. Mediastinal and hilar adenopathy.  5. Gastric wall thickening, question gastritis.  6. Cystic pancreatic neck lesion (6 mm). A one-year follow-up MRI is   recommended to assess stability  7. Bilateral renal vein filling defects, suspicious for renal vein   thrombosis, or less likely mixing artifact. The right renal vein filling   defect partially extends into the inferior vena cava.  8. Left iliac wing lytic lesion, suspicious for metastasis.  9. Indeterminate nodular adrenal thickening.    < end of copied text >  DVT/GI ppx  Discussed with pt @ bedside

## 2018-10-25 NOTE — PROGRESS NOTE ADULT - PROBLEM SELECTOR PLAN 2
Steroids.
-  plan for Heparin gtt as above
CA 19-9 normal   Bone lesion BX pending for primary path
CA 19-9 normal   Bone lesion BX pending for primary path.

## 2018-10-25 NOTE — PROGRESS NOTE ADULT - SUBJECTIVE AND OBJECTIVE BOX
Juan Pickard LIOLI  [id number]   Dr Lanre Rajan   [facility]   CONTACT Sp Gideon [facility]      VITALS/LABS                           10/25/2018 afeb 99 136/93 97%   10/25/2018 W 12.9 Hb 12.5 Plt 175 Na 141 K 4.2 CO2 31 Cr 1.2     REVIEW OF SYMPTOMS    Able to give ROS  Yes     RELIABLE No   CONSTITUTIONAL Weakness Yes  Chills No Vision changes No  ENDOCRINE No unexplained hair loss No heat or cold intolerance    ALLERGY No hives  Sore throat No   RESP Coughing blood no  Shortness of breath YES   NEURO No Headache  Confusion Pain neck No   CARDIAC No Chest pain No Palpitations   GI No Pain abdomen NO   Vomiting NO     PHYSICAL EXAM    HEENT Unremarkable PERRLA atraumatic   RESP Fair air entry EXP prolonged    Harsh breath sound Resp distres mild   CARDIAC S1 S2 No S3     NO JVD    ABDOMEN SOFT BS PRESENT NOT DISTENDED No hepatosplenomegaly PEDAL EDEMA present No calf tenderness  NO rash   GENERAL Not TOXIC looking    GLOBAL ISSUE/BEST PRACTICE:      PROBLEM: HOB elevation:   y            PROBLEM: Stress ulcer proph:    na                      PROBLEM: VTE prophylaxis:      IV ufh (10/22) held 10/24 for procedure   PROBLEM: Glycemic control:    na  PROBLEM: Nutrition:    reg (10/21)   PROBLEM: Advanced directive: na     PROBLEM: Allergies:  pcn  PROCEDURE needle bx ilaic 10/24/2018    HOSPITAL COURSE 10/21/2018  ADMISSION [facility]   63 year old male with history of prediabetes, HTN, COPD, nephrotic syndrome on prednisone 60mg x 1 month, Dr Senior nephro chronic back pain requiring spinal fusions x 2 who was sent in by Dr. Rajan to get CT w/ contrast to further workup a suspicious iliac lesion found on MRI. Patient reports that he's had severe back pain w/ numbness radiating down his right leg x 2months. A week ago he went to Dr. Rajan, who ordered the MRI. When he wanted to follow it up w/ a CT w/ contrast, the pt's nephrologist asked that it be done as inpt under the supervision of a nephrologist. Pt denies fever, chills, chest pain, shortness of breath, abdominal pain.   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 Risks, benefits, alternatives  discussed in detail.

## 2018-10-25 NOTE — DISCHARGE NOTE ADULT - MEDICATION SUMMARY - MEDICATIONS TO TAKE
I will START or STAY ON the medications listed below when I get home from the hospital:    predniSONE 20 mg oral tablet  -- 2 tab(s) by mouth once a day   -- It is very important that you take or use this exactly as directed.  Do not skip doses or discontinue unless directed by your doctor.  Obtain medical advice before taking any non-prescription drugs as some may affect the action of this medication.  Take with food or milk.    -- Indication: For Focal Segmental Glomerulosclerosis    predniSONE 5 mg oral tablet  -- 1 tab(s) by mouth once a day  Take with (2) 20 mg tab to complete 45 mg daily dose  -- Indication: For Focal Segmental Glomerulosclersosis    oxyCODONE 10 mg oral tablet  -- 1 tab(s) by mouth every 6 hours, As Needed -Severe Pain (7 - 10) MDD:40 mg  -- Indication: For Pain    Cozaar 25 mg oral tablet  -- 1 tab(s) by mouth once a day  -- Indication: For Essential Hypertension    Flomax 0.4 mg oral capsule  -- 1 cap(s) by mouth once a day  -- Indication: For BPH    Eliquis Starter Pack 5 mg oral tablet  -- 2 tab(s) by mouth every 12 hours  -- Indication: For Pulmonary Embolism    LaMICtal 100 mg oral tablet  -- 1 tab(s) by mouth 2 times a day  -- Indication: For Anxiety    Valium 5 mg oral tablet  -- 1 tab(s) by mouth every 8 hours, As needed, Anxiety or Muscle Spasm MDD:15 mg  -- Indication: For Anxiety    metFORMIN 500 mg oral tablet  -- 1 tab(s) by mouth 2 times a day (before meals)   -- Check with your doctor before becoming pregnant.  Do not drink alcoholic beverages when taking this medication.  It is very important that you take or use this exactly as directed.  Do not skip doses or discontinue unless directed by your doctor.  Obtain medical advice before taking any non-prescription drugs as some may affect the action of this medication.  Take with food or milk.    -- Indication: For Steroid Induced Diabetes    Lipitor 40 mg oral tablet  -- 1 tab(s) by mouth once a day  -- Indication: For Hyperlipidemia    Ambien 10 mg oral tablet  -- 1 tab(s) by mouth once a day (at bedtime), As Needed for insomnia  -- Indication: For Insomnia    Singulair 10 mg oral tablet  -- 1 tab(s) by mouth once a day  -- Indication: For COPD (chronic obstructive pulmonary disease)    magnesium oxide 400 mg (241.3 mg elemental magnesium) oral tablet  -- 1 tab(s) by mouth once a day  -- Indication: For Supplement

## 2018-10-25 NOTE — PROGRESS NOTE ADULT - PROBLEM SELECTOR PLAN 1
Bone Metastases.  Mediastinal Lymphadenopathy.  Smoker.  Biopsy of bone for tissue Diagnosis.
Metastases Cancer
- would switch to heparin and plan for NOAC on d/c  - follow TTE today  - follow platelets and cbc daily
PO Protonix .     Upper gastrointestinal endoscopy in future after pulmonary work up.
PO protonix .   upper gastrointestinal endoscopy in future after pulmonary work up.

## 2018-10-26 LAB
CULTURE RESULTS: SIGNIFICANT CHANGE UP
CULTURE RESULTS: SIGNIFICANT CHANGE UP
SPECIMEN SOURCE: SIGNIFICANT CHANGE UP
SPECIMEN SOURCE: SIGNIFICANT CHANGE UP

## 2018-10-30 DIAGNOSIS — N04.1 NEPHROTIC SYNDROME WITH FOCAL AND SEGMENTAL GLOMERULAR LESIONS: ICD-10-CM

## 2018-10-30 DIAGNOSIS — E78.5 HYPERLIPIDEMIA, UNSPECIFIED: ICD-10-CM

## 2018-10-30 DIAGNOSIS — Z88.0 ALLERGY STATUS TO PENICILLIN: ICD-10-CM

## 2018-10-30 DIAGNOSIS — K29.70 GASTRITIS, UNSPECIFIED, WITHOUT BLEEDING: ICD-10-CM

## 2018-10-30 DIAGNOSIS — N40.0 BENIGN PROSTATIC HYPERPLASIA WITHOUT LOWER URINARY TRACT SYMPTOMS: ICD-10-CM

## 2018-10-30 DIAGNOSIS — E66.9 OBESITY, UNSPECIFIED: ICD-10-CM

## 2018-10-30 DIAGNOSIS — F41.9 ANXIETY DISORDER, UNSPECIFIED: ICD-10-CM

## 2018-10-30 DIAGNOSIS — I26.99 OTHER PULMONARY EMBOLISM WITHOUT ACUTE COR PULMONALE: ICD-10-CM

## 2018-10-30 DIAGNOSIS — R73.03 PREDIABETES: ICD-10-CM

## 2018-10-30 DIAGNOSIS — Z90.49 ACQUIRED ABSENCE OF OTHER SPECIFIED PARTS OF DIGESTIVE TRACT: ICD-10-CM

## 2018-10-30 DIAGNOSIS — D13.6 BENIGN NEOPLASM OF PANCREAS: ICD-10-CM

## 2018-10-30 DIAGNOSIS — J43.2 CENTRILOBULAR EMPHYSEMA: ICD-10-CM

## 2018-10-30 DIAGNOSIS — J98.11 ATELECTASIS: ICD-10-CM

## 2018-10-30 DIAGNOSIS — Z87.891 PERSONAL HISTORY OF NICOTINE DEPENDENCE: ICD-10-CM

## 2018-10-30 DIAGNOSIS — M54.9 DORSALGIA, UNSPECIFIED: ICD-10-CM

## 2018-10-30 DIAGNOSIS — N28.0 ISCHEMIA AND INFARCTION OF KIDNEY: ICD-10-CM

## 2018-10-30 DIAGNOSIS — K21.9 GASTRO-ESOPHAGEAL REFLUX DISEASE WITHOUT ESOPHAGITIS: ICD-10-CM

## 2018-10-30 DIAGNOSIS — G47.00 INSOMNIA, UNSPECIFIED: ICD-10-CM

## 2018-10-30 DIAGNOSIS — C79.51 SECONDARY MALIGNANT NEOPLASM OF BONE: ICD-10-CM

## 2018-10-30 PROBLEM — N04.9 NEPHROTIC SYNDROME WITH UNSPECIFIED MORPHOLOGIC CHANGES: Chronic | Status: ACTIVE | Noted: 2018-10-21

## 2018-10-30 PROBLEM — J44.9 CHRONIC OBSTRUCTIVE PULMONARY DISEASE, UNSPECIFIED: Chronic | Status: ACTIVE | Noted: 2018-10-21

## 2018-11-02 ENCOUNTER — APPOINTMENT (OUTPATIENT)
Dept: NUCLEAR MEDICINE | Facility: CLINIC | Age: 63
End: 2018-11-02
Payer: MEDICARE

## 2018-11-02 ENCOUNTER — OUTPATIENT (OUTPATIENT)
Dept: OUTPATIENT SERVICES | Facility: HOSPITAL | Age: 63
LOS: 1 days | End: 2018-11-02

## 2018-11-02 DIAGNOSIS — Z90.49 ACQUIRED ABSENCE OF OTHER SPECIFIED PARTS OF DIGESTIVE TRACT: Chronic | ICD-10-CM

## 2018-11-02 DIAGNOSIS — Z98.1 ARTHRODESIS STATUS: Chronic | ICD-10-CM

## 2018-11-02 DIAGNOSIS — C79.51 SECONDARY MALIGNANT NEOPLASM OF BONE: ICD-10-CM

## 2018-11-02 DIAGNOSIS — C34.91 MALIGNANT NEOPLASM OF UNSPECIFIED PART OF RIGHT BRONCHUS OR LUNG: ICD-10-CM

## 2018-11-02 PROCEDURE — 78815 PET IMAGE W/CT SKULL-THIGH: CPT | Mod: 26,PI

## 2018-11-09 ENCOUNTER — OUTPATIENT (OUTPATIENT)
Dept: OUTPATIENT SERVICES | Facility: HOSPITAL | Age: 63
LOS: 1 days | End: 2018-11-09

## 2018-11-09 ENCOUNTER — APPOINTMENT (OUTPATIENT)
Dept: MRI IMAGING | Facility: CLINIC | Age: 63
End: 2018-11-09
Payer: MEDICARE

## 2018-11-09 DIAGNOSIS — Z98.1 ARTHRODESIS STATUS: Chronic | ICD-10-CM

## 2018-11-09 DIAGNOSIS — Z90.49 ACQUIRED ABSENCE OF OTHER SPECIFIED PARTS OF DIGESTIVE TRACT: Chronic | ICD-10-CM

## 2018-11-09 PROCEDURE — 70553 MRI BRAIN STEM W/O & W/DYE: CPT | Mod: 26

## 2018-11-20 ENCOUNTER — INPATIENT (INPATIENT)
Facility: HOSPITAL | Age: 63
LOS: 2 days | Discharge: HOME | End: 2018-11-23
Attending: INTERNAL MEDICINE | Admitting: INTERNAL MEDICINE
Payer: MEDICARE

## 2018-11-20 VITALS
OXYGEN SATURATION: 87 % | WEIGHT: 160.06 LBS | DIASTOLIC BLOOD PRESSURE: 67 MMHG | TEMPERATURE: 102 F | SYSTOLIC BLOOD PRESSURE: 109 MMHG | HEART RATE: 129 BPM | HEIGHT: 66 IN | RESPIRATION RATE: 20 BRPM

## 2018-11-20 DIAGNOSIS — Z90.49 ACQUIRED ABSENCE OF OTHER SPECIFIED PARTS OF DIGESTIVE TRACT: Chronic | ICD-10-CM

## 2018-11-20 DIAGNOSIS — I26.99 OTHER PULMONARY EMBOLISM WITHOUT ACUTE COR PULMONALE: ICD-10-CM

## 2018-11-20 DIAGNOSIS — Z98.1 ARTHRODESIS STATUS: Chronic | ICD-10-CM

## 2018-11-20 LAB
ALBUMIN SERPL ELPH-MCNC: 1.3 G/DL — LOW (ref 3.3–5)
ALP SERPL-CCNC: 106 U/L — SIGNIFICANT CHANGE UP (ref 40–120)
ALT FLD-CCNC: 54 U/L — SIGNIFICANT CHANGE UP (ref 12–78)
ANION GAP SERPL CALC-SCNC: 8 MMOL/L — SIGNIFICANT CHANGE UP (ref 5–17)
APTT BLD: 26.6 SEC — LOW (ref 27.5–36.3)
AST SERPL-CCNC: 58 U/L — HIGH (ref 15–37)
BASOPHILS # BLD AUTO: 0.01 K/UL — SIGNIFICANT CHANGE UP (ref 0–0.2)
BASOPHILS NFR BLD AUTO: 0.1 % — SIGNIFICANT CHANGE UP (ref 0–2)
BILIRUB SERPL-MCNC: 0.3 MG/DL — SIGNIFICANT CHANGE UP (ref 0.2–1.2)
BUN SERPL-MCNC: 19 MG/DL — SIGNIFICANT CHANGE UP (ref 7–23)
CALCIUM SERPL-MCNC: 7.9 MG/DL — LOW (ref 8.5–10.1)
CHLORIDE SERPL-SCNC: 99 MMOL/L — SIGNIFICANT CHANGE UP (ref 96–108)
CO2 SERPL-SCNC: 29 MMOL/L — SIGNIFICANT CHANGE UP (ref 22–31)
CREAT SERPL-MCNC: 1.14 MG/DL — SIGNIFICANT CHANGE UP (ref 0.5–1.3)
EOSINOPHIL # BLD AUTO: 0 K/UL — SIGNIFICANT CHANGE UP (ref 0–0.5)
EOSINOPHIL NFR BLD AUTO: 0 % — SIGNIFICANT CHANGE UP (ref 0–6)
GLUCOSE SERPL-MCNC: 114 MG/DL — HIGH (ref 70–99)
HCT VFR BLD CALC: 34.7 % — LOW (ref 39–50)
HGB BLD-MCNC: 11.6 G/DL — LOW (ref 13–17)
IMM GRANULOCYTES NFR BLD AUTO: 0.7 % — SIGNIFICANT CHANGE UP (ref 0–1.5)
INR BLD: 1.01 RATIO — SIGNIFICANT CHANGE UP (ref 0.88–1.16)
LACTATE SERPL-SCNC: 1.2 MMOL/L — SIGNIFICANT CHANGE UP (ref 0.7–2)
LYMPHOCYTES # BLD AUTO: 0.8 K/UL — LOW (ref 1–3.3)
LYMPHOCYTES # BLD AUTO: 6.7 % — LOW (ref 13–44)
MCHC RBC-ENTMCNC: 29.7 PG — SIGNIFICANT CHANGE UP (ref 27–34)
MCHC RBC-ENTMCNC: 33.4 GM/DL — SIGNIFICANT CHANGE UP (ref 32–36)
MCV RBC AUTO: 88.7 FL — SIGNIFICANT CHANGE UP (ref 80–100)
MONOCYTES # BLD AUTO: 0.06 K/UL — SIGNIFICANT CHANGE UP (ref 0–0.9)
MONOCYTES NFR BLD AUTO: 0.5 % — LOW (ref 2–14)
NEUTROPHILS # BLD AUTO: 10.93 K/UL — HIGH (ref 1.8–7.4)
NEUTROPHILS NFR BLD AUTO: 92 % — HIGH (ref 43–77)
NRBC # BLD: 0 /100 WBCS — SIGNIFICANT CHANGE UP (ref 0–0)
PLATELET # BLD AUTO: 144 K/UL — LOW (ref 150–400)
POTASSIUM SERPL-MCNC: 4.3 MMOL/L — SIGNIFICANT CHANGE UP (ref 3.5–5.3)
POTASSIUM SERPL-SCNC: 4.3 MMOL/L — SIGNIFICANT CHANGE UP (ref 3.5–5.3)
PROT SERPL-MCNC: 4.6 GM/DL — LOW (ref 6–8.3)
PROTHROM AB SERPL-ACNC: 11.2 SEC — SIGNIFICANT CHANGE UP (ref 10–12.9)
RAPID RVP RESULT: DETECTED
RBC # BLD: 3.91 M/UL — LOW (ref 4.2–5.8)
RBC # FLD: 15.4 % — HIGH (ref 10.3–14.5)
RV+EV RNA SPEC QL NAA+PROBE: DETECTED
SODIUM SERPL-SCNC: 136 MMOL/L — SIGNIFICANT CHANGE UP (ref 135–145)
WBC # BLD: 11.88 K/UL — HIGH (ref 3.8–10.5)
WBC # FLD AUTO: 11.88 K/UL — HIGH (ref 3.8–10.5)

## 2018-11-20 PROCEDURE — 71045 X-RAY EXAM CHEST 1 VIEW: CPT | Mod: 26

## 2018-11-20 PROCEDURE — 71250 CT THORAX DX C-: CPT | Mod: 26

## 2018-11-20 PROCEDURE — 99285 EMERGENCY DEPT VISIT HI MDM: CPT

## 2018-11-20 PROCEDURE — 93010 ELECTROCARDIOGRAM REPORT: CPT

## 2018-11-20 RX ORDER — MONTELUKAST 4 MG/1
10 TABLET, CHEWABLE ORAL AT BEDTIME
Qty: 0 | Refills: 0 | Status: DISCONTINUED | OUTPATIENT
Start: 2018-11-20 | End: 2018-11-23

## 2018-11-20 RX ORDER — ZOLPIDEM TARTRATE 10 MG/1
5 TABLET ORAL AT BEDTIME
Qty: 0 | Refills: 0 | Status: DISCONTINUED | OUTPATIENT
Start: 2018-11-20 | End: 2018-11-23

## 2018-11-20 RX ORDER — IPRATROPIUM/ALBUTEROL SULFATE 18-103MCG
3 AEROSOL WITH ADAPTER (GRAM) INHALATION
Qty: 0 | Refills: 0 | Status: COMPLETED | OUTPATIENT
Start: 2018-11-20 | End: 2018-11-20

## 2018-11-20 RX ORDER — TAMSULOSIN HYDROCHLORIDE 0.4 MG/1
0.4 CAPSULE ORAL AT BEDTIME
Qty: 0 | Refills: 0 | Status: DISCONTINUED | OUTPATIENT
Start: 2018-11-20 | End: 2018-11-23

## 2018-11-20 RX ORDER — ATORVASTATIN CALCIUM 80 MG/1
40 TABLET, FILM COATED ORAL AT BEDTIME
Qty: 0 | Refills: 0 | Status: DISCONTINUED | OUTPATIENT
Start: 2018-11-20 | End: 2018-11-23

## 2018-11-20 RX ORDER — TIOTROPIUM BROMIDE 18 UG/1
1 CAPSULE ORAL; RESPIRATORY (INHALATION) DAILY
Qty: 0 | Refills: 0 | Status: DISCONTINUED | OUTPATIENT
Start: 2018-11-20 | End: 2018-11-23

## 2018-11-20 RX ORDER — CEFEPIME 1 G/1
1000 INJECTION, POWDER, FOR SOLUTION INTRAMUSCULAR; INTRAVENOUS EVERY 8 HOURS
Qty: 0 | Refills: 0 | Status: DISCONTINUED | OUTPATIENT
Start: 2018-11-20 | End: 2018-11-20

## 2018-11-20 RX ORDER — LOSARTAN POTASSIUM 100 MG/1
25 TABLET, FILM COATED ORAL DAILY
Qty: 0 | Refills: 0 | Status: DISCONTINUED | OUTPATIENT
Start: 2018-11-21 | End: 2018-11-23

## 2018-11-20 RX ORDER — OXYCODONE HYDROCHLORIDE 5 MG/1
10 TABLET ORAL EVERY 6 HOURS
Qty: 0 | Refills: 0 | Status: DISCONTINUED | OUTPATIENT
Start: 2018-11-20 | End: 2018-11-23

## 2018-11-20 RX ORDER — MAGNESIUM SULFATE 500 MG/ML
2 VIAL (ML) INJECTION ONCE
Qty: 0 | Refills: 0 | Status: COMPLETED | OUTPATIENT
Start: 2018-11-20 | End: 2018-11-20

## 2018-11-20 RX ORDER — SODIUM CHLORIDE 9 MG/ML
2300 INJECTION INTRAMUSCULAR; INTRAVENOUS; SUBCUTANEOUS ONCE
Qty: 0 | Refills: 0 | Status: COMPLETED | OUTPATIENT
Start: 2018-11-20 | End: 2018-11-20

## 2018-11-20 RX ORDER — IPRATROPIUM/ALBUTEROL SULFATE 18-103MCG
3 AEROSOL WITH ADAPTER (GRAM) INHALATION EVERY 6 HOURS
Qty: 0 | Refills: 0 | Status: DISCONTINUED | OUTPATIENT
Start: 2018-11-20 | End: 2018-11-23

## 2018-11-20 RX ORDER — SODIUM CHLORIDE 9 MG/ML
500 INJECTION INTRAMUSCULAR; INTRAVENOUS; SUBCUTANEOUS ONCE
Qty: 0 | Refills: 0 | Status: COMPLETED | OUTPATIENT
Start: 2018-11-20 | End: 2018-11-20

## 2018-11-20 RX ORDER — CEFEPIME 1 G/1
1000 INJECTION, POWDER, FOR SOLUTION INTRAMUSCULAR; INTRAVENOUS ONCE
Qty: 0 | Refills: 0 | Status: COMPLETED | OUTPATIENT
Start: 2018-11-20 | End: 2018-11-20

## 2018-11-20 RX ORDER — CEFEPIME 1 G/1
INJECTION, POWDER, FOR SOLUTION INTRAMUSCULAR; INTRAVENOUS
Qty: 0 | Refills: 0 | Status: DISCONTINUED | OUTPATIENT
Start: 2018-11-20 | End: 2018-11-23

## 2018-11-20 RX ORDER — CEFEPIME 1 G/1
INJECTION, POWDER, FOR SOLUTION INTRAMUSCULAR; INTRAVENOUS
Qty: 0 | Refills: 0 | Status: DISCONTINUED | OUTPATIENT
Start: 2018-11-20 | End: 2018-11-20

## 2018-11-20 RX ORDER — KETOROLAC TROMETHAMINE 30 MG/ML
30 SYRINGE (ML) INJECTION ONCE
Qty: 0 | Refills: 0 | Status: DISCONTINUED | OUTPATIENT
Start: 2018-11-20 | End: 2018-11-20

## 2018-11-20 RX ORDER — SENNA PLUS 8.6 MG/1
2 TABLET ORAL AT BEDTIME
Qty: 0 | Refills: 0 | Status: DISCONTINUED | OUTPATIENT
Start: 2018-11-20 | End: 2018-11-23

## 2018-11-20 RX ORDER — VANCOMYCIN HCL 1 G
1000 VIAL (EA) INTRAVENOUS ONCE
Qty: 0 | Refills: 0 | Status: COMPLETED | OUTPATIENT
Start: 2018-11-20 | End: 2018-11-20

## 2018-11-20 RX ORDER — LAMOTRIGINE 25 MG/1
100 TABLET, ORALLY DISINTEGRATING ORAL
Qty: 0 | Refills: 0 | Status: DISCONTINUED | OUTPATIENT
Start: 2018-11-20 | End: 2018-11-23

## 2018-11-20 RX ORDER — ACETAMINOPHEN 500 MG
1000 TABLET ORAL EVERY 6 HOURS
Qty: 0 | Refills: 0 | Status: DISCONTINUED | OUTPATIENT
Start: 2018-11-20 | End: 2018-11-23

## 2018-11-20 RX ORDER — MAGNESIUM OXIDE 400 MG ORAL TABLET 241.3 MG
400 TABLET ORAL DAILY
Qty: 0 | Refills: 0 | Status: DISCONTINUED | OUTPATIENT
Start: 2018-11-20 | End: 2018-11-23

## 2018-11-20 RX ORDER — CEFEPIME 1 G/1
1000 INJECTION, POWDER, FOR SOLUTION INTRAMUSCULAR; INTRAVENOUS EVERY 8 HOURS
Qty: 0 | Refills: 0 | Status: DISCONTINUED | OUTPATIENT
Start: 2018-11-20 | End: 2018-11-23

## 2018-11-20 RX ORDER — MORPHINE SULFATE 50 MG/1
4 CAPSULE, EXTENDED RELEASE ORAL ONCE
Qty: 0 | Refills: 0 | Status: DISCONTINUED | OUTPATIENT
Start: 2018-11-20 | End: 2018-11-20

## 2018-11-20 RX ORDER — ONDANSETRON 8 MG/1
4 TABLET, FILM COATED ORAL EVERY 4 HOURS
Qty: 0 | Refills: 0 | Status: DISCONTINUED | OUTPATIENT
Start: 2018-11-20 | End: 2018-11-23

## 2018-11-20 RX ORDER — DIAZEPAM 5 MG
5 TABLET ORAL EVERY 8 HOURS
Qty: 0 | Refills: 0 | Status: DISCONTINUED | OUTPATIENT
Start: 2018-11-20 | End: 2018-11-23

## 2018-11-20 RX ORDER — DOCUSATE SODIUM 100 MG
100 CAPSULE ORAL
Qty: 0 | Refills: 0 | Status: DISCONTINUED | OUTPATIENT
Start: 2018-11-20 | End: 2018-11-23

## 2018-11-20 RX ORDER — APIXABAN 2.5 MG/1
5 TABLET, FILM COATED ORAL EVERY 12 HOURS
Qty: 0 | Refills: 0 | Status: DISCONTINUED | OUTPATIENT
Start: 2018-11-20 | End: 2018-11-23

## 2018-11-20 RX ORDER — FOLIC ACID 0.8 MG
1 TABLET ORAL DAILY
Qty: 0 | Refills: 0 | Status: DISCONTINUED | OUTPATIENT
Start: 2018-11-20 | End: 2018-11-23

## 2018-11-20 RX ADMIN — CEFEPIME 1000 MILLIGRAM(S): 1 INJECTION, POWDER, FOR SOLUTION INTRAMUSCULAR; INTRAVENOUS at 23:47

## 2018-11-20 RX ADMIN — Medication 250 MILLIGRAM(S): at 08:39

## 2018-11-20 RX ADMIN — Medication 3 MILLILITER(S): at 01:23

## 2018-11-20 RX ADMIN — SENNA PLUS 2 TABLET(S): 8.6 TABLET ORAL at 20:59

## 2018-11-20 RX ADMIN — Medication 100 MILLIGRAM(S): at 20:59

## 2018-11-20 RX ADMIN — SODIUM CHLORIDE 2300 MILLILITER(S): 9 INJECTION INTRAMUSCULAR; INTRAVENOUS; SUBCUTANEOUS at 01:12

## 2018-11-20 RX ADMIN — Medication 50 GRAM(S): at 01:18

## 2018-11-20 RX ADMIN — ZOLPIDEM TARTRATE 5 MILLIGRAM(S): 10 TABLET ORAL at 22:40

## 2018-11-20 RX ADMIN — ATORVASTATIN CALCIUM 40 MILLIGRAM(S): 80 TABLET, FILM COATED ORAL at 20:59

## 2018-11-20 RX ADMIN — Medication 100 MILLIGRAM(S): at 17:01

## 2018-11-20 RX ADMIN — MORPHINE SULFATE 4 MILLIGRAM(S): 50 CAPSULE, EXTENDED RELEASE ORAL at 08:15

## 2018-11-20 RX ADMIN — SODIUM CHLORIDE 2300 MILLILITER(S): 9 INJECTION INTRAMUSCULAR; INTRAVENOUS; SUBCUTANEOUS at 01:59

## 2018-11-20 RX ADMIN — CEFEPIME 100 MILLIGRAM(S): 1 INJECTION, POWDER, FOR SOLUTION INTRAMUSCULAR; INTRAVENOUS at 08:38

## 2018-11-20 RX ADMIN — Medication 30 MILLIGRAM(S): at 01:58

## 2018-11-20 RX ADMIN — OXYCODONE HYDROCHLORIDE 10 MILLIGRAM(S): 5 TABLET ORAL at 11:00

## 2018-11-20 RX ADMIN — Medication 125 MILLIGRAM(S): at 01:12

## 2018-11-20 RX ADMIN — Medication 30 MILLIGRAM(S): at 02:29

## 2018-11-20 RX ADMIN — CEFEPIME 1000 MILLIGRAM(S): 1 INJECTION, POWDER, FOR SOLUTION INTRAMUSCULAR; INTRAVENOUS at 17:03

## 2018-11-20 RX ADMIN — LAMOTRIGINE 100 MILLIGRAM(S): 25 TABLET, ORALLY DISINTEGRATING ORAL at 17:01

## 2018-11-20 RX ADMIN — Medication 3 MILLILITER(S): at 01:59

## 2018-11-20 RX ADMIN — MAGNESIUM OXIDE 400 MG ORAL TABLET 400 MILLIGRAM(S): 241.3 TABLET ORAL at 12:38

## 2018-11-20 RX ADMIN — Medication 2 GRAM(S): at 02:30

## 2018-11-20 RX ADMIN — OXYCODONE HYDROCHLORIDE 10 MILLIGRAM(S): 5 TABLET ORAL at 09:55

## 2018-11-20 RX ADMIN — Medication 3 MILLILITER(S): at 20:07

## 2018-11-20 RX ADMIN — ZOLPIDEM TARTRATE 5 MILLIGRAM(S): 10 TABLET ORAL at 23:47

## 2018-11-20 RX ADMIN — APIXABAN 5 MILLIGRAM(S): 2.5 TABLET, FILM COATED ORAL at 08:38

## 2018-11-20 RX ADMIN — MORPHINE SULFATE 4 MILLIGRAM(S): 50 CAPSULE, EXTENDED RELEASE ORAL at 07:41

## 2018-11-20 RX ADMIN — Medication 1 MILLIGRAM(S): at 12:38

## 2018-11-20 RX ADMIN — SODIUM CHLORIDE 1000 MILLILITER(S): 9 INJECTION INTRAMUSCULAR; INTRAVENOUS; SUBCUTANEOUS at 05:55

## 2018-11-20 RX ADMIN — MONTELUKAST 10 MILLIGRAM(S): 4 TABLET, CHEWABLE ORAL at 20:59

## 2018-11-20 RX ADMIN — TAMSULOSIN HYDROCHLORIDE 0.4 MILLIGRAM(S): 0.4 CAPSULE ORAL at 22:39

## 2018-11-20 RX ADMIN — Medication 3 MILLILITER(S): at 02:09

## 2018-11-20 RX ADMIN — APIXABAN 5 MILLIGRAM(S): 2.5 TABLET, FILM COATED ORAL at 17:01

## 2018-11-20 RX ADMIN — OXYCODONE HYDROCHLORIDE 10 MILLIGRAM(S): 5 TABLET ORAL at 18:25

## 2018-11-20 NOTE — CONSULT NOTE ADULT - ASSESSMENT
This is a 62 y/o male with PMH of recently PE on Eliquis, copd, hypertension, nephrotic syndrome, spinal surgery in past, s/p cholecystectomy in past, recently diagnosed stage IV lung cancer with mets to bone and brain started chemotherapy on 11/16/18, had one cycle of whole brain radiation admitted on 11/20 for evaluation of fever, chills and increased lethargy to the point where he was ready to pass out; was febrile on admission to 101.9 has cough that mildly productive and notes grandchildren with URI.   1. Patient admitted with pneumonia which will treat as health care associated pneumonia given patient on treatment for his metastatic lung cancer  - patient at risk for gram negative rods and other resistant bacteria   - oxygen and nebs as needed   - serial cbc and monitor temperature   - reviewed prior medical records to evaluate for resistant or atypical pathogens   - agree with cefepime as ordered  - will add doxycycline to cover atypical and resistant bacteria so as to avoid vancomycin in this patient with history nephrotic syndrome  2. other issues:  PE on Eliquis, copd, hypertension, nephrotic syndrome, spinal surgery in past, s/p cholecystectomy in past, recently diagnosed stage IV lung cancer with mets to bone and brain started chemotherapy on 11/16/18, had one cycle of whole brain radiation  - per medicine This is a 64 y/o male with PMH of recently PE on Eliquis, copd, hypertension, nephrotic syndrome, spinal surgery in past, s/p cholecystectomy in past, recently diagnosed stage IV lung cancer with mets to bone and brain started chemotherapy on 11/16/18, had one cycle of whole brain radiation admitted on 11/20 for evaluation of fever, chills and increased lethargy to the point where he was ready to pass out; was febrile on admission to 101.9 has cough that mildly productive and notes grandchildren with URI.   1. Patient admitted with pneumonia which will treat as health care associated pneumonia given patient on treatment for his metastatic lung cancer; also noted with leukocytosis most likely reactive to infection  - patient at risk for gram negative rods and other resistant bacteria   - oxygen and nebs as needed   - serial cbc and monitor temperature   - reviewed prior medical records to evaluate for resistant or atypical pathogens   - agree with cefepime as ordered  - will add doxycycline to cover atypical and resistant bacteria so as to avoid vancomycin in this patient with history nephrotic syndrome  2. other issues:  PE on Eliquis, copd, hypertension, nephrotic syndrome, spinal surgery in past, s/p cholecystectomy in past, recently diagnosed stage IV lung cancer with mets to bone and brain started chemotherapy on 11/16/18, had one cycle of whole brain radiation  - per medicine

## 2018-11-20 NOTE — ED ADULT NURSE REASSESSMENT NOTE - NS ED NURSE REASSESS COMMENT FT1
Patient remains as previously assessed. VSS, resting comfortably in bed. No s/s of distress present, plan of care discussed. Hand-off report to receiving RN Sharonda, transport to . Safety & comfort measures in place, purposeful active rounding on my time.

## 2018-11-20 NOTE — H&P ADULT - NSHPOUTPATIENTPROVIDERS_GEN_ALL_CORE
Oncologist: Dr. Onofre at [facility]  Radiation Oncologist: Dr. Rust ([facility]

## 2018-11-20 NOTE — ED PROVIDER NOTE - CARE PLAN
Principal Discharge DX:	Sepsis, due to unspecified organism  Secondary Diagnosis:	Pneumonia of both lungs due to infectious organism, unspecified part of lung

## 2018-11-20 NOTE — ED PROVIDER NOTE - PROGRESS NOTE DETAILS
pt with improvement in VS after IVF boluses.   will continue to monitor pending results pt and spouse told of Ct results and agree with plan for admit for sepsis and PNA.  case d/w Angel and will admit

## 2018-11-20 NOTE — H&P ADULT - HISTORY OF PRESENT ILLNESS
This is a 62 y/o male with PMH of recently PE on Eliquis, recently diagnosed stage IV lung cancer with mets to bone and brain started chemotherapy on 11/16/18 presents to ED with complains of fever and chills with increasing lethargy for the past 2 days. Patient was initiated on chemotherapy on Friday and had first dose of whole brain radiation treatment yesterday under care of Dr. Rust. He went home and felt lethargic with fever as high as 102. Patient endorses sick contacts including grandchildren who had viral illness. No nausea/ vomiting/ abd pain or diarrhea. Notes mildly productive cough.     In the ED, notable labs include: WBC      Past Medical History:  Stage IV Lung cancer with mets to bone and brain (dx 10/2018) on chemo  COPD (chronic obstructive pulmonary disease)  - not O2 dependent  Bilateral PE - dx 10/2018 and started on Eliquis.   Nephrotic syndrome - FSGS  HTN    Past Surgical History:  H/O spinal fusion 2013 and repeat surgery in 2015  History of cholecystectomy.    Family History:  Father: prostate cancer  Mother: breast cancer and CAD.     Meds: reviewed.   Social Hx: former smoker quit 6 years ago, hx of 2-3pks/ day X 40 years. No illicit drug use or ETOH use.       Vital Signs Last 24 Hrs  T(C): 36.8 (20 Nov 2018 07:31), Max: 38.8 (20 Nov 2018 00:27)  T(F): 98.2 (20 Nov 2018 07:31), Max: 101.9 (20 Nov 2018 00:27)  HR: 91 (20 Nov 2018 07:31) (91 - 129)  BP: 124/89 (20 Nov 2018 07:31) (95/68 - 124/89)  BP(mean): 80 (20 Nov 2018 06:16) (78 - 88)  RR: 15 (20 Nov 2018 07:31) (11 - 20)  SpO2: 96% (20 Nov 2018 07:31) (87% - 100%)  PHYSICAL EXAM:    Constitutional: NAD, awake and alert, well-developed  HEENT: PERR, EOMI, Normal Hearing, MMM  Neck: Soft and supple, No LAD, No JVD  Respiratory: Breath sounds are clear bilaterally, No wheezing, rales or rhonchi  Cardiovascular: S1 and S2, regular rate and rhythm, no Murmurs, gallops or rubs  Gastrointestinal: Bowel Sounds present, soft, nontender, nondistended, no guarding, no rebound  Extremities: No peripheral edema  Vascular: 2+ peripheral pulses  Neurological: A/O x 3, no focal deficits  Musculoskeletal: 5/5 strength b/l upper and lower extremities  Skin: No rashes    MEDICATIONS  (STANDING):  ALBUTerol/ipratropium for Nebulization 3 milliLiter(s) Nebulizer every 6 hours  apixaban 5 milliGRAM(s) Oral every 12 hours  atorvastatin 40 milliGRAM(s) Oral at bedtime  cefepime   IVPB      cefepime   IVPB 1000 milliGRAM(s) IV Intermittent once  cefepime   IVPB 1000 milliGRAM(s) IV Intermittent every 8 hours  folic acid 1 milliGRAM(s) Oral daily  guaiFENesin/dextromethorphan  Syrup 5 milliLiter(s) Oral every 6 hours  lamoTRIgine 100 milliGRAM(s) Oral two times a day  magnesium oxide 400 milliGRAM(s) Oral daily  montelukast 10 milliGRAM(s) Oral at bedtime  senna 2 Tablet(s) Oral at bedtime  tamsulosin 0.4 milliGRAM(s) Oral at bedtime  tiotropium 18 MICROgram(s) Capsule 1 Capsule(s) Inhalation daily  vancomycin  IVPB 1000 milliGRAM(s) IV Intermittent once    LABS: All Labs Reviewed:                        11.6 11.88 )-----------( 144      ( 20 Nov 2018 01:00 )             34.7     11-20    136  |  99  |  19  ----------------------------<  114<H>  4.3   |  29  |  1.14    Ca    7.9<L>      20 Nov 2018 01:00    TPro  4.6<L>  /  Alb  1.3<L>  /  TBili  0.3  /  DBili  x   /  AST  58<H>  /  ALT  54  /  AlkPhos  106  11-20    PT/INR - ( 20 Nov 2018 01:00 )   PT: 11.2 sec;   INR: 1.01 ratio    PTT - ( 20 Nov 2018 01:00 )  PTT:26.6 sec    Blood Culture: pending    CT Chest No Cont (11.20.18 @ 02:58)   New multifocal areas of distention patchy reticulonodular density in the   left upper lobe, right middle lobe, and right lower lobe with small   pleural effusions may reflect superimposed infection in patient with   known history of metastatic disease. Mediastinal and bilateral hilar   adenopathy.    Cardiac and mediastinal lipomatosis, including lipomatous hypertrophy of   the interatrial septum.    Bilateral adrenal masses, consistent with known metastatic disease.       MR Head w/wo IV Cont Disc (11.09.18 @ 17:13) >  Impression: Atrophy. There are innumerable supra and infratentorial   bilateral enhancing lesions consistent with brain metastases. As there is   minimal vasogenic edema some of these may represent leptomeningeal as   well as parenchymal metastases. No hydrocephalus or midline shift.      ASSESSMENT AND PLAN: This is a 62 y/o male with PMH of recently PE on Eliquis, recently diagnosed stage IV lung cancer with mets to bone and brain started chemotherapy on 11/16/18 presents to ED with complains of fever and chills with increasing lethargy for the past 2 days. Patient was initiated on chemotherapy on Friday and had first dose of whole brain radiation treatment yesterday under care of Dr. Rust. He went home and felt lethargic with fever as high as 102. Patient endorses sick contacts including grandchildren who had viral illness. No nausea/ vomiting/ abd pain or diarrhea. Notes mildly productive cough.     In the ED, tmax 101.9, notable labs include: WBC 11.8 and CT chest concerning for mulitfocal pneumonia, + Enterovirus. Patient was given Levaquin and 3L IVFs with admission requested.     ROS: stated above otherwise negative.       Past Medical History:  Stage IV Lung cancer with mets to bone and brain (dx 10/2018) on chemo  COPD (chronic obstructive pulmonary disease)  - not O2 dependent  Bilateral PE - dx 10/2018 and started on Eliquis.   Nephrotic syndrome - FSGS  HTN    Past Surgical History:  H/O spinal fusion 2013 and repeat surgery in 2015  History of cholecystectomy.    Family History:  Father: prostate cancer  Mother: breast cancer and CAD.     Meds: reviewed.   Social Hx: former smoker quit 6 years ago, hx of 2-3pks/ day X 40 years. No illicit drug use or ETOH use.   Allergy: PCN (told as a child, unknown possible reaction)    Vital Signs Last 24 Hrs  T(C): 36.8 (20 Nov 2018 07:31), Max: 38.8 (20 Nov 2018 00:27)  T(F): 98.2 (20 Nov 2018 07:31), Max: 101.9 (20 Nov 2018 00:27)  HR: 91 (20 Nov 2018 07:31) (91 - 129)  BP: 124/89 (20 Nov 2018 07:31) (95/68 - 124/89)  BP(mean): 80 (20 Nov 2018 06:16) (78 - 88)  RR: 15 (20 Nov 2018 07:31) (11 - 20)  SpO2: 96% (20 Nov 2018 07:31) (87% - 100%)  PHYSICAL EXAM:    Constitutional: Middle aged male appears weak but awake and alert, well-developed  HEENT: PERR, EOMI, Normal Hearing, MMM  Neck: Soft and supple, No LAD, No JVD  Respiratory: Bilateral rhonchi, no wheezing or rales.   Cardiovascular: S1 and S2, regular rate and rhythm, no Murmurs, gallops or rubs  Gastrointestinal: Bowel Sounds present, soft, nontender, nondistended, no guarding, no rebound  Extremities: Mild LE edema bilateral to ankles  Vascular: 2+ peripheral pulses  Neurological: A/O x 3, no focal deficits  Musculoskeletal: 5/5 strength b/l upper and lower extremities  Skin: No rashes    MEDICATIONS  (STANDING):  ALBUTerol/ipratropium for Nebulization 3 milliLiter(s) Nebulizer every 6 hours  apixaban 5 milliGRAM(s) Oral every 12 hours  atorvastatin 40 milliGRAM(s) Oral at bedtime  cefepime   IVPB      cefepime   IVPB 1000 milliGRAM(s) IV Intermittent once  cefepime   IVPB 1000 milliGRAM(s) IV Intermittent every 8 hours  folic acid 1 milliGRAM(s) Oral daily  guaiFENesin/dextromethorphan  Syrup 5 milliLiter(s) Oral every 6 hours  lamoTRIgine 100 milliGRAM(s) Oral two times a day  magnesium oxide 400 milliGRAM(s) Oral daily  montelukast 10 milliGRAM(s) Oral at bedtime  senna 2 Tablet(s) Oral at bedtime  tamsulosin 0.4 milliGRAM(s) Oral at bedtime  tiotropium 18 MICROgram(s) Capsule 1 Capsule(s) Inhalation daily  vancomycin  IVPB 1000 milliGRAM(s) IV Intermittent once    LABS: All Labs Reviewed:                        11.6   11.88 )-----------( 144      ( 20 Nov 2018 01:00 )             34.7     11-20    136  |  99  |  19  ----------------------------<  114<H>  4.3   |  29  |  1.14    Ca    7.9<L>      20 Nov 2018 01:00    TPro  4.6<L>  /  Alb  1.3<L>  /  TBili  0.3  /  DBili  x   /  AST  58<H>  /  ALT  54  /  AlkPhos  106  11-20    PT/INR - ( 20 Nov 2018 01:00 )   PT: 11.2 sec;   INR: 1.01 ratio    PTT - ( 20 Nov 2018 01:00 )  PTT:26.6 sec    Blood Culture: pending    CT Chest No Cont (11.20.18 @ 02:58)   New multifocal areas of distention patchy reticulonodular density in the   left upper lobe, right middle lobe, and right lower lobe with small   pleural effusions may reflect superimposed infection in patient with   known history of metastatic disease. Mediastinal and bilateral hilar   adenopathy.    Cardiac and mediastinal lipomatosis, including lipomatous hypertrophy of   the interatrial septum.    Bilateral adrenal masses, consistent with known metastatic disease.       MR Head w/wo IV Cont Disc (11.09.18 @ 17:13)   Impression: Atrophy. There are innumerable supra and infratentorial   bilateral enhancing lesions consistent with brain metastases. As there is   minimal vasogenic edema some of these may represent leptomeningeal as   well as parenchymal metastases. No hydrocephalus or midline shift.      ASSESSMENT AND PLAN:     This is a 62 y/o male with PMH of recently PE on Eliquis, recently diagnosed stage IV lung cancer with mets to bone and brain started chemotherapy on 11/16/18 presents to ED with complains of fever and chills with increasing lethargy for the past 2 days.    # HCAP / Immunocompromised patient / Enterovirus URI  # Sepsis 2/2 above.   - s/p IV fluids. At risk of gram neg edison and MRSA infections superimposed in the setting of underlying obstructive lung disease.   - will start IV abx: Cefepime (monitor for reaction) and Vanc for suspected gram neg PNA.   - f/u blood ctx.   - consult ID for further recs.   - trend daily labs.   - prn anti-pyretics.     # Stage IV Lung Cancer with Mets to Brain and Bone  - s/p initial chemo at [place] last Friday, next treatment due in 3 weeks.   - s/p initial brain radiation on 11/19, hold on further treatment until sepsis resolves.   - pain control for chronic back pain    # Hx of PE - not home O2 dependent. Likely provoked in the setting of metastatic cancer, needs lifelong AC.   - continue Eliquis 5mg BID    # Hx of Nephrotic Syndrome - FSGS  - mild pedal edema noted otherwise with proteinuria.   - on prednisone taper down to 20mg daily, will resume.   - await recs from ID regarding needing to start Bactrim for PCP ppx. Per patient he should be on downward taper 5mg q4days.     # HTN - low normal BP, resume Cozaar tomorrow.     DVT ppx: on Eliquis.   Dispo: admit to med-surg on IV abx. Discussed with family in depth.   Total time > 90 mins.

## 2018-11-20 NOTE — ED ADULT NURSE REASSESSMENT NOTE - NS ED NURSE REASSESS COMMENT FT1
Patient A&Ox4, resting in bed. VSS, reports pain relief s/p morphine as prescribed. No s/s of distress present. Moist cough, nonproductive; SpO2 high 90s-100% with supplemental oxygen. Admit orders pending, wait time explained. Safety & comfort measures in place, will continue to monitor.

## 2018-11-20 NOTE — ED PROVIDER NOTE - OBJECTIVE STATEMENT
64 y/o male in ED c/o fever, cough, sob and myalgia today.   spouse states pt given tylenol 1 hr PTA.    states grandkids were sick last week.    pt denies any HA, cp, n/v/d/abd pain.    no recent travel

## 2018-11-20 NOTE — CONSULT NOTE ADULT - SUBJECTIVE AND OBJECTIVE BOX
Patient is a 63y old  Male who presents with a chief complaint of fever, weakness (20 Nov 2018 08:25)    HPI:  This is a 62 y/o male with PMH of recently PE on Eliquis, copd, hypertension, nephrotic syndrome, spinal surgery in past, s/p cholecystectomy in past, recently diagnosed stage IV lung cancer with mets to bone and brain started chemotherapy on 11/16/18, had one cycle of whole brain radiation admitted on 11/20 for evaluation of fever, chills and increased lethargy to the point where he was ready to pass out; was febrile on admission to 101.9 has cough that mildly productive and notes grandchildren with URI.                 PMH: as above  PSH: as above  Meds: per reconciliation sheet, noted below  MEDICATIONS  (STANDING):  ALBUTerol/ipratropium for Nebulization 3 milliLiter(s) Nebulizer every 6 hours  apixaban 5 milliGRAM(s) Oral every 12 hours  atorvastatin 40 milliGRAM(s) Oral at bedtime  cefepime   IVPB      cefepime   IVPB 1000 milliGRAM(s) IV Intermittent every 8 hours  doxycycline hyclate Capsule 100 milliGRAM(s) Oral every 12 hours  folic acid 1 milliGRAM(s) Oral daily  guaiFENesin/dextromethorphan  Syrup 5 milliLiter(s) Oral every 6 hours  lamoTRIgine 100 milliGRAM(s) Oral two times a day  magnesium oxide 400 milliGRAM(s) Oral daily  montelukast 10 milliGRAM(s) Oral at bedtime  senna 2 Tablet(s) Oral at bedtime  tamsulosin 0.4 milliGRAM(s) Oral at bedtime  tiotropium 18 MICROgram(s) Capsule 1 Capsule(s) Inhalation daily    MEDICATIONS  (PRN):  acetaminophen   Tablet .. 1000 milliGRAM(s) Oral every 6 hours PRN Mild Pain (1 - 3)  diazepam    Tablet 5 milliGRAM(s) Oral every 8 hours PRN Anxiety or Muscle Spasm  docusate sodium 100 milliGRAM(s) Oral two times a day PRN Constipation  ondansetron Injectable 4 milliGRAM(s) IV Push every 4 hours PRN Nausea and/or Vomiting  oxyCODONE    IR 10 milliGRAM(s) Oral every 6 hours PRN Severe Pain (7 - 10)    Allergies    penicillin (Unknown)--rash as child    Intolerances      Social: past  smoking, no alcohol, no illegal drugs; no recent travel, no exposure to TB  FAMILY HISTORY:  No pertinent family history in first degree relatives     no history of premature cardiovascular disease in first degree relatives  ROS: the patient has no chills, no HA, no dizziness, no sore throat, no blurry vision, no CP, no palpitations,  no abdominal pain, no diarrhea, no N/V, no dysuria, no leg pain, no claudication, no rash, no joint aches, no rectal pain or bleeding, no night sweats  All other systems reviewed and are negative    Vital Signs Last 24 Hrs  T(C): 36.8 (20 Nov 2018 07:31), Max: 38.8 (20 Nov 2018 00:27)  T(F): 98.2 (20 Nov 2018 07:31), Max: 101.9 (20 Nov 2018 00:27)  HR: 93 (20 Nov 2018 09:53) (91 - 129)  BP: 108/83 (20 Nov 2018 09:53) (95/68 - 124/89)  BP(mean): 80 (20 Nov 2018 06:16) (78 - 88)  RR: 16 (20 Nov 2018 09:53) (11 - 20)  SpO2: 95% (20 Nov 2018 09:53) (87% - 100%)  Daily Height in cm: 167.64 (20 Nov 2018 00:27)    Daily     PE:    Constitutional: frail looking  HEENT: NC/AT, EOMI, PERRLA, conjunctivae clear; ears and nose atraumatic; pharynx clear  Neck: supple; thyroid not palpable  Back: no tenderness  Respiratory: respiratory effort normal; bilateral rhonchi  Cardiovascular: S1S2 regular, no murmurs  Abdomen: soft, not tender, not distended, positive BS; no liver or spleen organomegaly  Genitourinary: no suprapubic tenderness  Musculoskeletal: no muscle tenderness, no joint swelling or tenderness  Neurological/ Psychiatric: AxOx3, judgement and insight normal;  moving all extremities  Skin: no rashes; no palpable lesions    Labs: all available labs reviewed                        11.6 11.88 )-----------( 144      ( 20 Nov 2018 01:00 )             34.7     11-20    136  |  99  |  19  ----------------------------<  114<H>  4.3   |  29  |  1.14    Ca    7.9<L>      20 Nov 2018 01:00    TPro  4.6<L>  /  Alb  1.3<L>  /  TBili  0.3  /  DBili  x   /  AST  58<H>  /  ALT  54  /  AlkPhos  106  11-20     LIVER FUNCTIONS - ( 20 Nov 2018 01:00 )  Alb: 1.3 g/dL / Pro: 4.6 gm/dL / ALK PHOS: 106 U/L / ALT: 54 U/L / AST: 58 U/L / GGT: x           < from: CT Chest No Cont (11.20.18 @ 02:58) >    EXAM:  CT CHEST                            PROCEDURE DATE:  11/20/2018          INTERPRETATION:  CLINICAL HISTORY: Shortness of breath with fever.   Metastatic disease.    COMPARISON: CT chest 10/21/2018    TECHNIQUE: Noncontrast CT scan of the thorax was performed and submitted   for interpretation.    FINDINGS:     Multifocal areas of dense and patchy reticulonodular density in the   posterior segment of the left upper lobe and less dense regions in the   right middle lobe and right lower lobe are present. Scattered satellite   pulmonary micronodules are noted. There is thickening of the adjacent   fissure. Mild bilateral pleural effusions are present. Mild to moderate   centrilobular emphysema is noted.    Multiple subcentimeter and enlarged mediastinal lymph nodes with mild   mediastinal stranding. Bilateral hilar adenopathy is noted.    There is increased fat deposition in the pericardium, epicardium, and   interatrial septum likely reflecting cardiac lipomatosis. Coronary artery   calcifications are noted. The heart chambers appear relatively small,   unchanged.    Mediastinal lipomatosis is evident. No pericardial effusion. No   pneumothorax or pneumomediastinum.    The thoracic aorta and main pulmonary artery are normal in caliber.   Atheromatous calcification along the aorta.    Bilateral adrenal masses likely reflect metastatic disease. Nonspecific   bilateral perinephric stranding noted. Post cholecystectomy.    IMPRESSION:     New multifocal areas of distention patchy reticulonodular density in the   left upper lobe, right middle lobe, and right lower lobe with small   pleural effusions may reflect superimposed infection in patient with   known history of metastatic disease. Mediastinal and bilateral hilar   adenopathy.    Cardiac and mediastinal lipomatosis, including lipomatous hypertrophy of   the interatrial septum.    Bilateral adrenal masses, consistent with known metastatic disease.            < end of copied text >        Radiology: all available radiological tests reviewed    Advanced directives addressed: full resuscitation

## 2018-11-20 NOTE — ED ADULT TRIAGE NOTE - CHIEF COMPLAINT QUOTE
fever started couple of hours ago, cough, dizziness. Denies chest pain, sob. hx of lung ca, first chemo Friday, COPD

## 2018-11-20 NOTE — ED ADULT NURSE NOTE - OBJECTIVE STATEMENT
Pt is a 63 y.o. male c/o fever and Right ear pain. pt has been newly diagnosed with metastatic lung CA and this past Friday (11/16/18) was his first round of chemo at [facility]. Pt was also experiencing SOB/STRAUSS, pt has hx of COPD. lung sounds bilaterally +wheeze on exhalation and ronchi, diminished at bases. pt was placed on low supplemental O2 which gave him some relief. as per pt's wife, pt received a dose of Tylenol at 2330 PTA. rectal temp 101.8. septic work up. 2 IV access placed. labs/cultures drawn. will continue to monitor. vital signs as charted.

## 2018-11-20 NOTE — ED ADULT NURSE REASSESSMENT NOTE - NS ED NURSE REASSESS COMMENT FT1
pt c/o pain to lower back and hunger. pt does not yet have a diet order. pt has been trending with low blood pressures. hospitalist has been called for a consult on the patient. message left on the voicemail. will continue to monitor. pt currently receiving additional 500 cc fluid bolus at this time for pressure improvement. will continue to monitor.

## 2018-11-21 LAB
ANION GAP SERPL CALC-SCNC: 9 MMOL/L — SIGNIFICANT CHANGE UP (ref 5–17)
BUN SERPL-MCNC: 30 MG/DL — HIGH (ref 7–23)
CALCIUM SERPL-MCNC: 7.9 MG/DL — LOW (ref 8.5–10.1)
CHLORIDE SERPL-SCNC: 102 MMOL/L — SIGNIFICANT CHANGE UP (ref 96–108)
CO2 SERPL-SCNC: 25 MMOL/L — SIGNIFICANT CHANGE UP (ref 22–31)
CREAT SERPL-MCNC: 1.07 MG/DL — SIGNIFICANT CHANGE UP (ref 0.5–1.3)
CULTURE RESULTS: NO GROWTH — SIGNIFICANT CHANGE UP
GLUCOSE SERPL-MCNC: 112 MG/DL — HIGH (ref 70–99)
HCT VFR BLD CALC: 30.8 % — LOW (ref 39–50)
HGB BLD-MCNC: 10.4 G/DL — LOW (ref 13–17)
MCHC RBC-ENTMCNC: 29.8 PG — SIGNIFICANT CHANGE UP (ref 27–34)
MCHC RBC-ENTMCNC: 33.8 GM/DL — SIGNIFICANT CHANGE UP (ref 32–36)
MCV RBC AUTO: 88.3 FL — SIGNIFICANT CHANGE UP (ref 80–100)
NRBC # BLD: 0 /100 WBCS — SIGNIFICANT CHANGE UP (ref 0–0)
PLATELET # BLD AUTO: 138 K/UL — LOW (ref 150–400)
POTASSIUM SERPL-MCNC: 4 MMOL/L — SIGNIFICANT CHANGE UP (ref 3.5–5.3)
POTASSIUM SERPL-SCNC: 4 MMOL/L — SIGNIFICANT CHANGE UP (ref 3.5–5.3)
RBC # BLD: 3.49 M/UL — LOW (ref 4.2–5.8)
RBC # FLD: 15.5 % — HIGH (ref 10.3–14.5)
SODIUM SERPL-SCNC: 136 MMOL/L — SIGNIFICANT CHANGE UP (ref 135–145)
SPECIMEN SOURCE: SIGNIFICANT CHANGE UP
WBC # BLD: 7.11 K/UL — SIGNIFICANT CHANGE UP (ref 3.8–10.5)
WBC # FLD AUTO: 7.11 K/UL — SIGNIFICANT CHANGE UP (ref 3.8–10.5)

## 2018-11-21 RX ORDER — LOPERAMIDE HCL 2 MG
2 TABLET ORAL THREE TIMES A DAY
Qty: 0 | Refills: 0 | Status: DISCONTINUED | OUTPATIENT
Start: 2018-11-21 | End: 2018-11-23

## 2018-11-21 RX ADMIN — Medication 100 MILLIGRAM(S): at 17:46

## 2018-11-21 RX ADMIN — Medication 1 MILLIGRAM(S): at 11:24

## 2018-11-21 RX ADMIN — LOSARTAN POTASSIUM 25 MILLIGRAM(S): 100 TABLET, FILM COATED ORAL at 06:24

## 2018-11-21 RX ADMIN — ATORVASTATIN CALCIUM 40 MILLIGRAM(S): 80 TABLET, FILM COATED ORAL at 21:16

## 2018-11-21 RX ADMIN — MONTELUKAST 10 MILLIGRAM(S): 4 TABLET, CHEWABLE ORAL at 21:16

## 2018-11-21 RX ADMIN — Medication 3 MILLILITER(S): at 11:42

## 2018-11-21 RX ADMIN — Medication 20 MILLIGRAM(S): at 06:24

## 2018-11-21 RX ADMIN — LAMOTRIGINE 100 MILLIGRAM(S): 25 TABLET, ORALLY DISINTEGRATING ORAL at 06:24

## 2018-11-21 RX ADMIN — APIXABAN 5 MILLIGRAM(S): 2.5 TABLET, FILM COATED ORAL at 06:24

## 2018-11-21 RX ADMIN — APIXABAN 5 MILLIGRAM(S): 2.5 TABLET, FILM COATED ORAL at 17:46

## 2018-11-21 RX ADMIN — CEFEPIME 1000 MILLIGRAM(S): 1 INJECTION, POWDER, FOR SOLUTION INTRAMUSCULAR; INTRAVENOUS at 06:24

## 2018-11-21 RX ADMIN — TAMSULOSIN HYDROCHLORIDE 0.4 MILLIGRAM(S): 0.4 CAPSULE ORAL at 21:16

## 2018-11-21 RX ADMIN — OXYCODONE HYDROCHLORIDE 10 MILLIGRAM(S): 5 TABLET ORAL at 10:11

## 2018-11-21 RX ADMIN — LAMOTRIGINE 100 MILLIGRAM(S): 25 TABLET, ORALLY DISINTEGRATING ORAL at 17:46

## 2018-11-21 RX ADMIN — CEFEPIME 1000 MILLIGRAM(S): 1 INJECTION, POWDER, FOR SOLUTION INTRAMUSCULAR; INTRAVENOUS at 21:16

## 2018-11-21 RX ADMIN — Medication 100 MILLIGRAM(S): at 06:24

## 2018-11-21 RX ADMIN — Medication 3 MILLILITER(S): at 20:14

## 2018-11-21 RX ADMIN — TIOTROPIUM BROMIDE 1 CAPSULE(S): 18 CAPSULE ORAL; RESPIRATORY (INHALATION) at 11:44

## 2018-11-21 RX ADMIN — MAGNESIUM OXIDE 400 MG ORAL TABLET 400 MILLIGRAM(S): 241.3 TABLET ORAL at 11:24

## 2018-11-21 RX ADMIN — Medication 100 MILLIGRAM(S): at 12:38

## 2018-11-21 RX ADMIN — OXYCODONE HYDROCHLORIDE 10 MILLIGRAM(S): 5 TABLET ORAL at 17:49

## 2018-11-21 RX ADMIN — Medication 100 MILLIGRAM(S): at 21:16

## 2018-11-21 RX ADMIN — Medication 2 MILLIGRAM(S): at 13:23

## 2018-11-21 RX ADMIN — CEFEPIME 1000 MILLIGRAM(S): 1 INJECTION, POWDER, FOR SOLUTION INTRAMUSCULAR; INTRAVENOUS at 13:55

## 2018-11-21 RX ADMIN — Medication 3 MILLILITER(S): at 14:22

## 2018-11-22 RX ADMIN — Medication 3 MILLILITER(S): at 09:57

## 2018-11-22 RX ADMIN — ZOLPIDEM TARTRATE 5 MILLIGRAM(S): 10 TABLET ORAL at 21:34

## 2018-11-22 RX ADMIN — Medication 100 MILLIGRAM(S): at 05:47

## 2018-11-22 RX ADMIN — OXYCODONE HYDROCHLORIDE 10 MILLIGRAM(S): 5 TABLET ORAL at 00:49

## 2018-11-22 RX ADMIN — OXYCODONE HYDROCHLORIDE 10 MILLIGRAM(S): 5 TABLET ORAL at 07:56

## 2018-11-22 RX ADMIN — CEFEPIME 1000 MILLIGRAM(S): 1 INJECTION, POWDER, FOR SOLUTION INTRAMUSCULAR; INTRAVENOUS at 21:29

## 2018-11-22 RX ADMIN — TIOTROPIUM BROMIDE 1 CAPSULE(S): 18 CAPSULE ORAL; RESPIRATORY (INHALATION) at 09:57

## 2018-11-22 RX ADMIN — Medication 100 MILLIGRAM(S): at 05:48

## 2018-11-22 RX ADMIN — LAMOTRIGINE 100 MILLIGRAM(S): 25 TABLET, ORALLY DISINTEGRATING ORAL at 17:04

## 2018-11-22 RX ADMIN — TAMSULOSIN HYDROCHLORIDE 0.4 MILLIGRAM(S): 0.4 CAPSULE ORAL at 21:29

## 2018-11-22 RX ADMIN — ATORVASTATIN CALCIUM 40 MILLIGRAM(S): 80 TABLET, FILM COATED ORAL at 21:29

## 2018-11-22 RX ADMIN — CEFEPIME 1000 MILLIGRAM(S): 1 INJECTION, POWDER, FOR SOLUTION INTRAMUSCULAR; INTRAVENOUS at 05:47

## 2018-11-22 RX ADMIN — ONDANSETRON 4 MILLIGRAM(S): 8 TABLET, FILM COATED ORAL at 12:19

## 2018-11-22 RX ADMIN — MAGNESIUM OXIDE 400 MG ORAL TABLET 400 MILLIGRAM(S): 241.3 TABLET ORAL at 12:09

## 2018-11-22 RX ADMIN — LAMOTRIGINE 100 MILLIGRAM(S): 25 TABLET, ORALLY DISINTEGRATING ORAL at 05:47

## 2018-11-22 RX ADMIN — Medication 3 MILLILITER(S): at 21:01

## 2018-11-22 RX ADMIN — Medication 100 MILLIGRAM(S): at 17:04

## 2018-11-22 RX ADMIN — OXYCODONE HYDROCHLORIDE 10 MILLIGRAM(S): 5 TABLET ORAL at 01:55

## 2018-11-22 RX ADMIN — OXYCODONE HYDROCHLORIDE 10 MILLIGRAM(S): 5 TABLET ORAL at 17:06

## 2018-11-22 RX ADMIN — APIXABAN 5 MILLIGRAM(S): 2.5 TABLET, FILM COATED ORAL at 05:47

## 2018-11-22 RX ADMIN — CEFEPIME 1000 MILLIGRAM(S): 1 INJECTION, POWDER, FOR SOLUTION INTRAMUSCULAR; INTRAVENOUS at 15:03

## 2018-11-22 RX ADMIN — Medication 100 MILLIGRAM(S): at 21:29

## 2018-11-22 RX ADMIN — APIXABAN 5 MILLIGRAM(S): 2.5 TABLET, FILM COATED ORAL at 17:04

## 2018-11-22 RX ADMIN — Medication 100 MILLIGRAM(S): at 15:03

## 2018-11-22 RX ADMIN — Medication 1 MILLIGRAM(S): at 12:09

## 2018-11-22 RX ADMIN — Medication 20 MILLIGRAM(S): at 05:48

## 2018-11-22 RX ADMIN — Medication 3 MILLILITER(S): at 16:39

## 2018-11-22 RX ADMIN — LOSARTAN POTASSIUM 25 MILLIGRAM(S): 100 TABLET, FILM COATED ORAL at 05:48

## 2018-11-22 RX ADMIN — Medication 2 MILLIGRAM(S): at 17:04

## 2018-11-22 RX ADMIN — MONTELUKAST 10 MILLIGRAM(S): 4 TABLET, CHEWABLE ORAL at 21:29

## 2018-11-22 NOTE — PROGRESS NOTE ADULT - ASSESSMENT
This is a 64 y/o male with PMH of recently PE on Eliquis, recently diagnosed stage IV lung cancer with mets to bone and brain started chemotherapy on 11/16/18 presents to ED with complains of fever and chills with increasing lethargy for the past 2 days.    # HCAP / Immunocompromised patient / Enterovirus URI  # Sepsis 2/2 above.   - s/p IV fluids. At risk of gram neg edison and MRSA infections superimposed in the setting of underlying obstructive lung disease.   - cont day #2 IV abx: Cefepime and Doxycycline for suspected gram neg PNA and MRSA coverage.   - neg blood ctx thus far.   - appreciate ID recs.   - mild leukocytosis resolved.   - prn anti-pyretics.   - WILL ADD COUGH SUPPRESSANTS --> HYCODAN STANDING AND TESSALON PERLES.     # Stage IV Lung Cancer with Mets to Brain and Bone  - s/p initial chemo at [facility] last Friday, next treatment due in 3 weeks.   - s/p initial brain radiation on 11/19, hold on further treatment until sepsis resolves.   - pain control for chronic back pain    # Hx of PE - not home O2 dependent. Likely provoked in the setting of metastatic cancer, needs lifelong AC.   - continue Eliquis 5mg BID    # Hx of Nephrotic Syndrome - FSGS  - mild pedal edema noted otherwise with proteinuria.   - on prednisone taper down to 20mg daily, resumed.   - await recs from ID regarding needing to start Bactrim for PCP ppx. Per patient he should be on downward taper 5mg q4days.     # HTN - low normal BP, resumed Cozaar.    # COPD - not home O2 dependent, no signs of exacerbation. Continue standing nebs today then make prn.     DVT ppx: on Eliquis.   Dispo: remain inpatient on IV abx, tentative dc in 1-2 days pending clinical improvement.   Total time > 40 mins.
This is a 62 y/o male with PMH of recently PE on Eliquis, copd, hypertension, nephrotic syndrome, spinal surgery in past, s/p cholecystectomy in past, recently diagnosed stage IV lung cancer with mets to bone and brain started chemotherapy on 11/16/18, had one cycle of whole brain radiation admitted on 11/20 for evaluation of fever, chills and increased lethargy to the point where he was ready to pass out; was febrile on admission to 101.9 has cough that mildly productive and notes grandchildren with URI.   1. Patient admitted with pneumonia which will treat as health care associated pneumonia given patient on treatment for his metastatic lung cancer; also noted with leukocytosis most likely reactive to infection  - patient at risk for gram negative rods and other resistant bacteria   - oxygen and nebs as needed   - serial cbc and monitor temperature   - reviewed prior medical records to evaluate for resistant or atypical pathogens   - day #2 cefepime and doxycycline  - tolerating antibiotics without rashes or side effects   2. other issues:  PE on Eliquis, copd, hypertension, nephrotic syndrome, spinal surgery in past, s/p cholecystectomy in past, recently diagnosed stage IV lung cancer with mets to bone and brain started chemotherapy on 11/16/18, had one cycle of whole brain radiation  - per medicine
This is a 62 y/o male with PMH of recently PE on Eliquis, recently diagnosed stage IV lung cancer with mets to bone and brain started chemotherapy on 11/16/18 presents to ED with complains of fever and chills with increasing lethargy for the past 2 days.    # HCAP / Immunocompromised patient / Enterovirus URI  # Sepsis 2/2 above.   - s/p IV fluids. At risk of gram neg edison and MRSA infections superimposed in the setting of underlying obstructive lung disease.   - cont day #3 IV abx: Cefepime and Doxycycline for suspected gram neg PNA and MRSA coverage.   - neg blood ctx thus far.   - appreciate ID recs.   - mild leukocytosis resolved.   - prn anti-pyretics.   -continue with COUGH SUPPRESSANTS --> HYCODAN STANDING AND TESSALON PERLES.     # Stage IV Lung Cancer with Mets to Brain and Bone  - s/p initial chemo at [facility] last Friday, next treatment due in 3 weeks.   - s/p initial brain radiation on 11/19, hold on further treatment until sepsis resolves.   - pain control for chronic back pain    # Hx of PE - not home O2 dependent. Likely provoked in the setting of metastatic cancer, needs lifelong AC.   - continue Eliquis 5mg BID    # Hx of Nephrotic Syndrome - FSGS  - mild pedal edema noted otherwise with proteinuria.   - on prednisone taper down to 20mg daily, resumed.   - await recs from ID regarding needing to start Bactrim for PCP ppx. Per patient he should be on downward taper 5mg q4days.     # HTN - low normal BP, resumed Cozaar.    # COPD - not home O2 dependent, no signs of exacerbation. Continue standing nebs today then make prn.     DVT ppx: on Eliquis.   Dispo: remain inpatient on IV abx, tentative dc tomorrow.   Total time > 40 mins.

## 2018-11-22 NOTE — PROGRESS NOTE ADULT - SUBJECTIVE AND OBJECTIVE BOX
CC: cough    HPI: This is a 62 y/o male with PMH of recently PE on Eliquis, recently diagnosed stage IV lung cancer with mets to bone and brain started chemotherapy on 11/16/18 presents to ED with complains of fever and chills with increasing lethargy for the past 2 days. Patient was initiated on chemotherapy on Friday and had first dose of whole brain radiation treatment yesterday under care of Dr. Rust. He went home and felt lethargic with fever as high as 102. Patient endorses sick contacts including grandchildren who had viral illness. No nausea/ vomiting/ abd pain or diarrhea. Notes mildly productive cough.     In the ED, tmax 101.9, notable labs include: WBC 11.8 and CT chest concerning for mulitfocal pneumonia, + Enterovirus. Patient was given Levaquin and 3L IVFs with admission requested.     11/21: Had multiple coughing fits overnight causing fecal incontinence, embarrased. No CP/ mild SOB. No fevers.     ROS: neg unless stated above.       Vital Signs Last 24 Hrs  T(C): 36.6 (21 Nov 2018 04:58), Max: 37.1 (20 Nov 2018 21:06)  T(F): 97.9 (21 Nov 2018 04:58), Max: 98.7 (20 Nov 2018 21:06)  HR: 100 (21 Nov 2018 04:58) (86 - 100)  BP: 123/85 (21 Nov 2018 04:58) (122/84 - 134/82)  BP(mean): --  RR: 16 (21 Nov 2018 04:58) (16 - 17)  SpO2: 94% (21 Nov 2018 04:58) (94% - 100%)    PHYSICAL EXAM:  Constitutional: NAD, awake and alert, well-developed elderly weak appearing male.   HEENT: PERR, EOMI, Normal Hearing, MMM  Neck: Soft and supple, No LAD, No JVD  Respiratory: mild end expiratory wheezing but decreased rhonchi. No rales.   Cardiovascular: S1 and S2, regular rate and rhythm.  Gastrointestinal: Bowel Sounds present, soft, nontender, nondistended, no guarding, no rebound  Extremities: No peripheral edema  Vascular: 2+ peripheral pulses  Neurological: A/O x 3, no focal deficits  Musculoskeletal: 5/5 strength b/l upper and lower extremities  Skin: No rashes    MEDICATIONS  (STANDING):  ALBUTerol/ipratropium for Nebulization 3 milliLiter(s) Nebulizer every 6 hours  apixaban 5 milliGRAM(s) Oral every 12 hours  atorvastatin 40 milliGRAM(s) Oral at bedtime  benzonatate 100 milliGRAM(s) Oral three times a day  cefepime  Injectable. 1000 milliGRAM(s) IV Push every 8 hours  cefepime  Injectable.      doxycycline hyclate Capsule 100 milliGRAM(s) Oral every 12 hours  folic acid 1 milliGRAM(s) Oral daily  HYDROcodone/homatropine Syrup 5 milliLiter(s) Oral every 6 hours  lamoTRIgine 100 milliGRAM(s) Oral two times a day  losartan 25 milliGRAM(s) Oral daily  magnesium oxide 400 milliGRAM(s) Oral daily  montelukast 10 milliGRAM(s) Oral at bedtime  predniSONE   Tablet 20 milliGRAM(s) Oral daily  senna 2 Tablet(s) Oral at bedtime  tamsulosin 0.4 milliGRAM(s) Oral at bedtime  tiotropium 18 MICROgram(s) Capsule 1 Capsule(s) Inhalation daily    LABS: All Labs Reviewed:                        10.4   7.11  )-----------( 138      ( 21 Nov 2018 07:29 )             30.8     11-21    136  |  102  |  30<H>  ----------------------------<  112<H>  4.0   |  25  |  1.07    Ca    7.9<L>      21 Nov 2018 07:29    TPro  4.6<L>  /  Alb  1.3<L>  /  TBili  0.3  /  DBili  x   /  AST  58<H>  /  ALT  54  /  AlkPhos  106  11-20  PT/INR - ( 20 Nov 2018 01:00 )   PT: 11.2 sec;   INR: 1.01 ratio    PTT - ( 20 Nov 2018 01:00 )  PTT:26.6 sec    Blood Culture: 11-20 @ 01:00  Organism --  Gram Stain Blood -- Gram Stain --  Specimen Source .Blood Blood-Peripheral  Culture-Blood --    CT Chest No Cont (11.20.18 @ 02:58)   New multifocal areas of distention patchy reticulonodular density in the   left upper lobe, right middle lobe, and right lower lobe with small   pleural effusions may reflect superimposed infection in patient with   known history of metastatic disease. Mediastinal and bilateral hilar   adenopathy.    Cardiac and mediastinal lipomatosis, including lipomatous hypertrophy of   the interatrial septum.    Bilateral adrenal masses, consistent with known metastatic disease.       MR Head w/wo IV Cont Disc (11.09.18 @ 17:13)   Impression: Atrophy. There are innumerable supra and infratentorial   bilateral enhancing lesions consistent with brain metastases. As there is   minimal vasogenic edema some of these may represent leptomeningeal as   well as parenchymal metastases. No hydrocephalus or midline shift.
CC: cough    HPI: This is a 62 y/o male with PMH of recently PE on Eliquis, recently diagnosed stage IV lung cancer with mets to bone and brain started chemotherapy on 11/16/18 presents to ED with complains of fever and chills with increasing lethargy for the past 2 days. Patient was initiated on chemotherapy on Friday and had first dose of whole brain radiation treatment yesterday under care of Dr. Rust. He went home and felt lethargic with fever as high as 102. Patient endorses sick contacts including grandchildren who had viral illness. No nausea/ vomiting/ abd pain or diarrhea. Notes mildly productive cough.     In the ED, tmax 101.9, notable labs include: WBC 11.8 and CT chest concerning for mulitfocal pneumonia, + Enterovirus. Patient was given Levaquin and 3L IVFs with admission requested.     11/21: Had multiple coughing fits overnight causing fecal incontinence, embarrased. No CP/ mild SOB. No fevers.     11/22: Still with coughing spells but overall starting to feel better. Not well enough for dc. No fevers overnight. + Green mucous upon expectoration.     ROS: neg unless stated above.     Vital Signs Last 24 Hrs  T(C): 36.8 (22 Nov 2018 05:16), Max: 37.2 (21 Nov 2018 19:14)  T(F): 98.2 (22 Nov 2018 05:16), Max: 98.9 (21 Nov 2018 19:14)  HR: 110 (22 Nov 2018 05:16) (77 - 110)  BP: 133/82 (22 Nov 2018 05:16) (111/83 - 151/88)  BP(mean): --  RR: 18 (22 Nov 2018 05:16) (18 - 18)  SpO2: 94% (22 Nov 2018 05:16) (94% - 98%)    PHYSICAL EXAM:  Constitutional: NAD, awake and alert, well-developed elderly weak appearing male.   HEENT: PERR, EOMI, Normal Hearing, MMM  Neck: Soft and supple, No LAD, No JVD  Respiratory: decreased wheezing, mild anterior rhonchi improving. No rales.   Cardiovascular: S1 and S2, regular rate and rhythm.  Gastrointestinal: Bowel Sounds present, soft, nontender, nondistended, no guarding, no rebound  Extremities: No peripheral edema  Vascular: 2+ peripheral pulses  Neurological: A/O x 3, no focal deficits  Musculoskeletal: 5/5 strength b/l upper and lower extremities  Skin: No rashes    MEDICATIONS  (STANDING):  ALBUTerol/ipratropium for Nebulization 3 milliLiter(s) Nebulizer every 6 hours  apixaban 5 milliGRAM(s) Oral every 12 hours  atorvastatin 40 milliGRAM(s) Oral at bedtime  benzonatate 100 milliGRAM(s) Oral three times a day  cefepime  Injectable. 1000 milliGRAM(s) IV Push every 8 hours  cefepime  Injectable.      doxycycline hyclate Capsule 100 milliGRAM(s) Oral every 12 hours  folic acid 1 milliGRAM(s) Oral daily  HYDROcodone/homatropine Syrup 5 milliLiter(s) Oral every 6 hours  lamoTRIgine 100 milliGRAM(s) Oral two times a day  losartan 25 milliGRAM(s) Oral daily  magnesium oxide 400 milliGRAM(s) Oral daily  montelukast 10 milliGRAM(s) Oral at bedtime  predniSONE   Tablet 20 milliGRAM(s) Oral daily  senna 2 Tablet(s) Oral at bedtime  tamsulosin 0.4 milliGRAM(s) Oral at bedtime  tiotropium 18 MICROgram(s) Capsule 1 Capsule(s) Inhalation daily      LABS: All Labs Reviewed:                        10.4   7.11  )-----------( 138      ( 21 Nov 2018 07:29 )             30.8     11-21    136  |  102  |  30<H>  ----------------------------<  112<H>  4.0   |  25  |  1.07    Ca    7.9<L>      21 Nov 2018 07:29    TPro  4.6<L>  /  Alb  1.3<L>  /  TBili  0.3  /  DBili  x   /  AST  58<H>  /  ALT  54  /  AlkPhos  106  11-20  PT/INR - ( 20 Nov 2018 01:00 )   PT: 11.2 sec;   INR: 1.01 ratio    PTT - ( 20 Nov 2018 01:00 )  PTT:26.6 sec    Blood Culture: 11-20 @ 01:00  Organism --  Gram Stain Blood -- Gram Stain --  Specimen Source .Blood Blood-Peripheral  Culture-Blood --    CT Chest No Cont (11.20.18 @ 02:58)   New multifocal areas of distention patchy reticulonodular density in the   left upper lobe, right middle lobe, and right lower lobe with small   pleural effusions may reflect superimposed infection in patient with   known history of metastatic disease. Mediastinal and bilateral hilar   adenopathy.    Cardiac and mediastinal lipomatosis, including lipomatous hypertrophy of   the interatrial septum.    Bilateral adrenal masses, consistent with known metastatic disease.       MR Head w/wo IV Cont Disc (11.09.18 @ 17:13)   Impression: Atrophy. There are innumerable supra and infratentorial   bilateral enhancing lesions consistent with brain metastases. As there is   minimal vasogenic edema some of these may represent leptomeningeal as   well as parenchymal metastases. No hydrocephalus or midline shift.
Patient is a 63y old  Male who presents with a chief complaint of fever, weakness (20 Nov 2018 08:25)    Date of service: 11-21-18 @ 14:43      Patient lying in bed, still with cough      ROS unable to obtain secondary to patient medical condition     MEDICATIONS  (STANDING):  ALBUTerol/ipratropium for Nebulization 3 milliLiter(s) Nebulizer every 6 hours  apixaban 5 milliGRAM(s) Oral every 12 hours  atorvastatin 40 milliGRAM(s) Oral at bedtime  benzonatate 100 milliGRAM(s) Oral three times a day  cefepime  Injectable. 1000 milliGRAM(s) IV Push every 8 hours  cefepime  Injectable.      doxycycline hyclate Capsule 100 milliGRAM(s) Oral every 12 hours  folic acid 1 milliGRAM(s) Oral daily  HYDROcodone/homatropine Syrup 5 milliLiter(s) Oral every 6 hours  lamoTRIgine 100 milliGRAM(s) Oral two times a day  losartan 25 milliGRAM(s) Oral daily  magnesium oxide 400 milliGRAM(s) Oral daily  montelukast 10 milliGRAM(s) Oral at bedtime  predniSONE   Tablet 20 milliGRAM(s) Oral daily  senna 2 Tablet(s) Oral at bedtime  tamsulosin 0.4 milliGRAM(s) Oral at bedtime  tiotropium 18 MICROgram(s) Capsule 1 Capsule(s) Inhalation daily    MEDICATIONS  (PRN):  acetaminophen   Tablet .. 1000 milliGRAM(s) Oral every 6 hours PRN Mild Pain (1 - 3)  diazepam    Tablet 5 milliGRAM(s) Oral every 8 hours PRN Anxiety or Muscle Spasm  docusate sodium 100 milliGRAM(s) Oral two times a day PRN Constipation  loperamide 2 milliGRAM(s) Oral three times a day PRN Diarrhea  ondansetron Injectable 4 milliGRAM(s) IV Push every 4 hours PRN Nausea and/or Vomiting  oxyCODONE    IR 10 milliGRAM(s) Oral every 6 hours PRN Severe Pain (7 - 10)  zolpidem 5 milliGRAM(s) Oral at bedtime PRN Insomnia  zolpidem 5 milliGRAM(s) Oral at bedtime PRN Insomnia      Vital Signs Last 24 Hrs  T(C): 37.1 (21 Nov 2018 12:19), Max: 37.1 (20 Nov 2018 21:06)  T(F): 98.8 (21 Nov 2018 12:19), Max: 98.8 (21 Nov 2018 12:19)  HR: 79 (21 Nov 2018 14:22) (77 - 100)  BP: 151/88 (21 Nov 2018 12:19) (123/85 - 151/88)  BP(mean): --  RR: 18 (21 Nov 2018 12:19) (16 - 18)  SpO2: 97% (21 Nov 2018 14:22) (94% - 100%)    Physical Exam:        PE:    Constitutional: frail looking  HEENT: NC/AT, EOMI, PERRLA, conjunctivae clear; ears and nose atraumatic; pharynx clear  Neck: supple; thyroid not palpable  Back: no tenderness  Respiratory: respiratory effort normal; bilateral rhonchi  Cardiovascular: S1S2 regular, no murmurs  Abdomen: soft, not tender, not distended, positive BS; no liver or spleen organomegaly  Genitourinary: no suprapubic tenderness  Musculoskeletal: no muscle tenderness, no joint swelling or tenderness  Neurological/ Psychiatric: AxOx3, judgement and insight normal;  moving all extremities  Skin: no rashes; no palpable lesions    Labs: all available labs reviewed                       Labs:                        10.4   7.11  )-----------( 138      ( 21 Nov 2018 07:29 )             30.8     11-21    136  |  102  |  30<H>  ----------------------------<  112<H>  4.0   |  25  |  1.07    Ca    7.9<L>      21 Nov 2018 07:29    TPro  4.6<L>  /  Alb  1.3<L>  /  TBili  0.3  /  DBili  x   /  AST  58<H>  /  ALT  54  /  AlkPhos  106  11-20           Cultures:       Culture - Blood (collected 11-20-18 @ 01:00)  Source: .Blood Blood-Peripheral  Preliminary Report (11-21-18 @ 10:01):    No growth to date.    Culture - Blood (collected 11-20-18 @ 01:00)  Source: .Blood Blood-Peripheral  Preliminary Report (11-21-18 @ 10:01):    No growth to date.              < from: CT Chest No Cont (11.20.18 @ 02:58) >    EXAM:  CT CHEST                            PROCEDURE DATE:  11/20/2018          INTERPRETATION:  CLINICAL HISTORY: Shortness of breath with fever.   Metastatic disease.    COMPARISON: CT chest 10/21/2018    TECHNIQUE: Noncontrast CT scan of the thorax was performed and submitted   for interpretation.    FINDINGS:     Multifocal areas of dense and patchy reticulonodular density in the   posterior segment of the left upper lobe and less dense regions in the   right middle lobe and right lower lobe are present. Scattered satellite   pulmonary micronodules are noted. There is thickening of the adjacent   fissure. Mild bilateral pleural effusions are present. Mild to moderate   centrilobular emphysema is noted.    Multiple subcentimeter and enlarged mediastinal lymph nodes with mild   mediastinal stranding. Bilateral hilar adenopathy is noted.    There is increased fat deposition in the pericardium, epicardium, and   interatrial septum likely reflecting cardiac lipomatosis. Coronary artery   calcifications are noted. The heart chambers appear relatively small,   unchanged.    Mediastinal lipomatosis is evident. No pericardial effusion. No   pneumothorax or pneumomediastinum.    The thoracic aorta and main pulmonary artery are normal in caliber.   Atheromatous calcification along the aorta.    Bilateral adrenal masses likely reflect metastatic disease. Nonspecific   bilateral perinephric stranding noted. Post cholecystectomy.    IMPRESSION:     New multifocal areas of distention patchy reticulonodular density in the   left upper lobe, right middle lobe, and right lower lobe with small   pleural effusions may reflect superimposed infection in patient with   known history of metastatic disease. Mediastinal and bilateral hilar   adenopathy.    Cardiac and mediastinal lipomatosis, including lipomatous hypertrophy of   the interatrial septum.    Bilateral adrenal masses, consistent with known metastatic disease.            < end of copied text >        Radiology: all available radiological tests reviewed    Advanced directives addressed: full resuscitation

## 2018-11-23 ENCOUNTER — TRANSCRIPTION ENCOUNTER (OUTPATIENT)
Age: 63
End: 2018-11-23

## 2018-11-23 VITALS
RESPIRATION RATE: 19 BRPM | HEART RATE: 115 BPM | TEMPERATURE: 99 F | OXYGEN SATURATION: 98 % | DIASTOLIC BLOOD PRESSURE: 74 MMHG | SYSTOLIC BLOOD PRESSURE: 121 MMHG

## 2018-11-23 RX ORDER — CEFUROXIME AXETIL 250 MG
1 TABLET ORAL
Qty: 14 | Refills: 0 | OUTPATIENT
Start: 2018-11-23 | End: 2018-11-29

## 2018-11-23 RX ADMIN — CEFEPIME 1000 MILLIGRAM(S): 1 INJECTION, POWDER, FOR SOLUTION INTRAMUSCULAR; INTRAVENOUS at 05:36

## 2018-11-23 RX ADMIN — OXYCODONE HYDROCHLORIDE 10 MILLIGRAM(S): 5 TABLET ORAL at 09:08

## 2018-11-23 RX ADMIN — Medication 3 MILLILITER(S): at 08:29

## 2018-11-23 RX ADMIN — OXYCODONE HYDROCHLORIDE 10 MILLIGRAM(S): 5 TABLET ORAL at 10:00

## 2018-11-23 RX ADMIN — Medication 100 MILLIGRAM(S): at 13:32

## 2018-11-23 RX ADMIN — Medication 20 MILLIGRAM(S): at 05:21

## 2018-11-23 RX ADMIN — CEFEPIME 1000 MILLIGRAM(S): 1 INJECTION, POWDER, FOR SOLUTION INTRAMUSCULAR; INTRAVENOUS at 13:32

## 2018-11-23 RX ADMIN — Medication 3 MILLILITER(S): at 13:23

## 2018-11-23 RX ADMIN — LOSARTAN POTASSIUM 25 MILLIGRAM(S): 100 TABLET, FILM COATED ORAL at 05:22

## 2018-11-23 RX ADMIN — Medication 100 MILLIGRAM(S): at 05:22

## 2018-11-23 RX ADMIN — MAGNESIUM OXIDE 400 MG ORAL TABLET 400 MILLIGRAM(S): 241.3 TABLET ORAL at 13:32

## 2018-11-23 RX ADMIN — APIXABAN 5 MILLIGRAM(S): 2.5 TABLET, FILM COATED ORAL at 05:21

## 2018-11-23 RX ADMIN — Medication 100 MILLIGRAM(S): at 05:21

## 2018-11-23 RX ADMIN — LAMOTRIGINE 100 MILLIGRAM(S): 25 TABLET, ORALLY DISINTEGRATING ORAL at 05:21

## 2018-11-23 RX ADMIN — Medication 1 MILLIGRAM(S): at 13:32

## 2018-11-23 NOTE — DISCHARGE NOTE ADULT - CARE PLAN
Principal Discharge DX:	Pneumonia of both lungs due to infectious organism, unspecified part of lung  Goal:	stable  Assessment and plan of treatment:	antibiotic

## 2018-11-23 NOTE — DISCHARGE NOTE ADULT - PATIENT PORTAL LINK FT
You can access the [facility] Patient Portal, offered by [facility], by registering with the following website: [email or web address]

## 2018-11-23 NOTE — DISCHARGE NOTE ADULT - MEDICATION SUMMARY - MEDICATIONS TO TAKE
I will START or STAY ON the medications listed below when I get home from the hospital:    predniSONE 5 mg oral tablet  -- 3 tab(s) by mouth once a day then decrease by 5mg every 4 days to complete taper.     -- Indication: For taper per instructions given by the prescribing doc    oxyCODONE 10 mg oral tablet  -- 1 tab(s) by mouth every 6 hours, As Needed -Severe Pain (7 - 10) MDD:40 mg  -- Indication: For .    Cozaar 25 mg oral tablet  -- 1 tab(s) by mouth once a day  -- Indication: For .    Flomax 0.4 mg oral capsule  -- 1 cap(s) by mouth once a day  -- Indication: For .    Eliquis Starter Pack 5 mg oral tablet  -- 1 tab(s) by mouth 2 times a day  -- Indication: For .    Valium 5 mg oral tablet  -- 1 tab(s) by mouth every 8 hours, As needed, Anxiety or Muscle Spasm MDD:15 mg  -- Indication: For .    LaMICtal 100 mg oral tablet  -- 1 tab(s) by mouth 2 times a day  -- Indication: For .    metFORMIN 500 mg oral tablet  -- 1 tab(s) by mouth 2 times a day (before meals)   -- Check with your doctor before becoming pregnant.  Do not drink alcoholic beverages when taking this medication.  It is very important that you take or use this exactly as directed.  Do not skip doses or discontinue unless directed by your doctor.  Obtain medical advice before taking any non-prescription drugs as some may affect the action of this medication.  Take with food or milk.    -- Indication: For .    Lipitor 40 mg oral tablet  -- 1 tab(s) by mouth once a day  -- Indication: For .    benzonatate 100 mg oral capsule  -- 1 cap(s) by mouth 3 times a day as needed for cough  -- Indication: For .    Ambien 10 mg oral tablet  -- 1 tab(s) by mouth once a day (at bedtime), As Needed for insomnia  -- Indication: For .    Spiriva 18 mcg inhalation capsule  -- 1 cap(s) inhaled once a day  -- Indication: For .    albuterol 90 mcg/inh inhalation aerosol  -- 2 puff(s) inhaled 4 times a day, As Needed  -- Indication: For .    cefuroxime 500 mg oral tablet  -- 1 tab(s) by mouth every 12 hours   -- Finish all this medication unless otherwise directed by prescriber.  Medication should be taken with plenty of water.  Take with food or milk.    -- Indication: For .    Demadex 20 mg oral tablet  -- 1 tab(s) by mouth once a day  -- Indication: For ..    Singulair 10 mg oral tablet  -- 1 tab(s) by mouth once a day  -- Indication: For .    magnesium oxide 400 mg (241.3 mg elemental magnesium) oral tablet  -- 1 tab(s) by mouth once a day  -- Indication: For .    folic acid  -- Indication: For .

## 2018-11-23 NOTE — DISCHARGE NOTE ADULT - CARE PROVIDER_API CALL
Renate Rodriguez (MD), Internal Medicine  [address] [place] [address]  Phone: [phone]  Fax: [phone]

## 2018-11-23 NOTE — DISCHARGE NOTE ADULT - HOSPITAL COURSE
This is a 62 y/o male with PMH of recently PE on Eliquis, recently diagnosed stage IV lung cancer with mets to bone and brain started chemotherapy on 11/16/18 presents to ED with complains of fever and chills with increasing lethargy for the past 2 days. Patient was initiated on chemotherapy on Friday and had first dose of whole brain radiation treatment yesterday under care of Dr. Rust. He went home and felt lethargic with fever as high as 102. Patient endorses sick contacts including grandchildren who had viral illness. No nausea/ vomiting/ abd pain or diarrhea. Notes mildly productive cough.     In the ED, tmax 101.9, notable labs include: WBC 11.8 and CT chest concerning for mulitfocal pneumonia, + Enterovirus. Patient was given Levaquin and 3L IVFs with admission requested.     11/21: Had multiple coughing fits overnight causing fecal incontinence, embarrased. No CP/ mild SOB. No fevers.     11/22: Still with coughing spells but overall starting to feel better. Not well enough for dc. No fevers overnight. + Green mucous upon expectoration.     ROS: neg unless stated above.                     Assessment and Plan:   · Assessment	  This is a 62 y/o male with PMH of recently PE on Eliquis, recently diagnosed stage IV lung cancer with mets to bone and brain started chemotherapy on 11/16/18 presents to ED with complains of fever and chills with increasing lethargy for the past 2 days.    # HCAP / Immunocompromised patient / Enterovirus URI  - ceftin po    # Stage IV Lung Cancer with Mets to Brain and Bone  - s/p initial chemo at [facility] last Friday, next treatment due in 3 weeks.   - s/p initial brain radiation on 11/19, hold on further treatment until sepsis resolves.   - pain control for chronic back pain    # Hx of PE - not home O2 dependent. Likely provoked in the setting of metastatic cancer, needs lifelong AC.   - continue Eliquis 5mg BID    # Hx of Nephrotic Syndrome - FSGS  - no Bactrim per Dr. Garcia    # HTN - low normal BP, resumed Cozaar.    # COPD - not home O2 dependent, no signs of exacerbation. Continue standing nebs today then make prn.

## 2018-11-30 DIAGNOSIS — Z88.0 ALLERGY STATUS TO PENICILLIN: ICD-10-CM

## 2018-11-30 DIAGNOSIS — Z79.01 LONG TERM (CURRENT) USE OF ANTICOAGULANTS: ICD-10-CM

## 2018-11-30 DIAGNOSIS — Z87.891 PERSONAL HISTORY OF NICOTINE DEPENDENCE: ICD-10-CM

## 2018-11-30 DIAGNOSIS — J15.6 PNEUMONIA DUE TO OTHER GRAM-NEGATIVE BACTERIA: ICD-10-CM

## 2018-11-30 DIAGNOSIS — C79.31 SECONDARY MALIGNANT NEOPLASM OF BRAIN: ICD-10-CM

## 2018-11-30 DIAGNOSIS — Z86.711 PERSONAL HISTORY OF PULMONARY EMBOLISM: ICD-10-CM

## 2018-11-30 DIAGNOSIS — Z90.49 ACQUIRED ABSENCE OF OTHER SPECIFIED PARTS OF DIGESTIVE TRACT: ICD-10-CM

## 2018-11-30 DIAGNOSIS — G89.29 OTHER CHRONIC PAIN: ICD-10-CM

## 2018-11-30 DIAGNOSIS — R15.9 FULL INCONTINENCE OF FECES: ICD-10-CM

## 2018-11-30 DIAGNOSIS — J06.9 ACUTE UPPER RESPIRATORY INFECTION, UNSPECIFIED: ICD-10-CM

## 2018-11-30 DIAGNOSIS — C79.51 SECONDARY MALIGNANT NEOPLASM OF BONE: ICD-10-CM

## 2018-11-30 DIAGNOSIS — C34.90 MALIGNANT NEOPLASM OF UNSPECIFIED PART OF UNSPECIFIED BRONCHUS OR LUNG: ICD-10-CM

## 2018-11-30 DIAGNOSIS — N05.1 UNSPECIFIED NEPHRITIC SYNDROME WITH FOCAL AND SEGMENTAL GLOMERULAR LESIONS: ICD-10-CM

## 2018-11-30 DIAGNOSIS — A41.9 SEPSIS, UNSPECIFIED ORGANISM: ICD-10-CM

## 2018-11-30 DIAGNOSIS — J44.0 CHRONIC OBSTRUCTIVE PULMONARY DISEASE WITH (ACUTE) LOWER RESPIRATORY INFECTION: ICD-10-CM

## 2018-11-30 DIAGNOSIS — Z79.84 LONG TERM (CURRENT) USE OF ORAL HYPOGLYCEMIC DRUGS: ICD-10-CM

## 2018-11-30 DIAGNOSIS — Z92.21 PERSONAL HISTORY OF ANTINEOPLASTIC CHEMOTHERAPY: ICD-10-CM

## 2018-11-30 DIAGNOSIS — B97.10 UNSPECIFIED ENTEROVIRUS AS THE CAUSE OF DISEASES CLASSIFIED ELSEWHERE: ICD-10-CM

## 2018-11-30 DIAGNOSIS — R50.9 FEVER, UNSPECIFIED: ICD-10-CM

## 2018-11-30 DIAGNOSIS — I10 ESSENTIAL (PRIMARY) HYPERTENSION: ICD-10-CM

## 2019-02-13 NOTE — ED ADULT NURSE NOTE - NSIMPLEMENTINTERV_GEN_ALL_ED
NAUSEA/ABDOMINAL PAIN Implemented All Universal Safety Interventions:  [place] to call system. Call bell, personal items and telephone within reach. Instruct patient to call for assistance. Room bathroom lighting operational. Non-slip footwear when patient is off stretcher. Physically safe environment: no spills, clutter or unnecessary equipment. Stretcher in lowest position, wheels locked, appropriate side rails in place.

## 2019-04-04 ENCOUNTER — INPATIENT (INPATIENT)
Facility: HOSPITAL | Age: 64
LOS: 10 days | Discharge: NURSING, REHAB OR LONG-TERM CARE | End: 2019-04-15
Attending: INTERNAL MEDICINE | Admitting: INTERNAL MEDICINE
Payer: MEDICARE

## 2019-04-04 VITALS — WEIGHT: 139.99 LBS | HEIGHT: 66 IN

## 2019-04-04 DIAGNOSIS — Z98.1 ARTHRODESIS STATUS: Chronic | ICD-10-CM

## 2019-04-04 DIAGNOSIS — Z90.49 ACQUIRED ABSENCE OF OTHER SPECIFIED PARTS OF DIGESTIVE TRACT: Chronic | ICD-10-CM

## 2019-04-04 LAB
ADD ON TEST-SPECIMEN IN LAB: SIGNIFICANT CHANGE UP
ALBUMIN SERPL ELPH-MCNC: 3 G/DL — LOW (ref 3.3–5)
ALP SERPL-CCNC: 107 U/L — SIGNIFICANT CHANGE UP (ref 40–120)
ALT FLD-CCNC: 16 U/L — SIGNIFICANT CHANGE UP (ref 12–78)
ANION GAP SERPL CALC-SCNC: 11 MMOL/L — SIGNIFICANT CHANGE UP (ref 5–17)
APTT BLD: 37.3 SEC — HIGH (ref 27.5–36.3)
AST SERPL-CCNC: 26 U/L — SIGNIFICANT CHANGE UP (ref 15–37)
BASOPHILS # BLD AUTO: 0.01 K/UL — SIGNIFICANT CHANGE UP (ref 0–0.2)
BASOPHILS NFR BLD AUTO: 0.1 % — SIGNIFICANT CHANGE UP (ref 0–2)
BILIRUB SERPL-MCNC: 0.3 MG/DL — SIGNIFICANT CHANGE UP (ref 0.2–1.2)
BUN SERPL-MCNC: 14 MG/DL — SIGNIFICANT CHANGE UP (ref 7–23)
CALCIUM SERPL-MCNC: 9.3 MG/DL — SIGNIFICANT CHANGE UP (ref 8.5–10.1)
CHLORIDE SERPL-SCNC: 98 MMOL/L — SIGNIFICANT CHANGE UP (ref 96–108)
CO2 SERPL-SCNC: 25 MMOL/L — SIGNIFICANT CHANGE UP (ref 22–31)
CREAT SERPL-MCNC: 1.36 MG/DL — HIGH (ref 0.5–1.3)
EOSINOPHIL # BLD AUTO: 0.01 K/UL — SIGNIFICANT CHANGE UP (ref 0–0.5)
EOSINOPHIL NFR BLD AUTO: 0.1 % — SIGNIFICANT CHANGE UP (ref 0–6)
FLUAV H1 2009 PAND RNA SPEC QL NAA+PROBE: DETECTED
GLUCOSE BLDC GLUCOMTR-MCNC: 110 MG/DL — HIGH (ref 70–99)
GLUCOSE BLDC GLUCOMTR-MCNC: 111 MG/DL — HIGH (ref 70–99)
GLUCOSE SERPL-MCNC: 122 MG/DL — HIGH (ref 70–99)
HCT VFR BLD CALC: 30.3 % — LOW (ref 39–50)
HGB BLD-MCNC: 10.4 G/DL — LOW (ref 13–17)
IMM GRANULOCYTES NFR BLD AUTO: 0.5 % — SIGNIFICANT CHANGE UP (ref 0–1.5)
INR BLD: 1.41 RATIO — HIGH (ref 0.88–1.16)
LACTATE SERPL-SCNC: 1.9 MMOL/L — SIGNIFICANT CHANGE UP (ref 0.7–2)
LACTATE SERPL-SCNC: 2.4 MMOL/L — HIGH (ref 0.7–2)
LYMPHOCYTES # BLD AUTO: 0.3 K/UL — LOW (ref 1–3.3)
LYMPHOCYTES # BLD AUTO: 3.2 % — LOW (ref 13–44)
MCHC RBC-ENTMCNC: 34.3 GM/DL — SIGNIFICANT CHANGE UP (ref 32–36)
MCHC RBC-ENTMCNC: 34.4 PG — HIGH (ref 27–34)
MCV RBC AUTO: 100.3 FL — HIGH (ref 80–100)
MONOCYTES # BLD AUTO: 0.22 K/UL — SIGNIFICANT CHANGE UP (ref 0–0.9)
MONOCYTES NFR BLD AUTO: 2.4 % — SIGNIFICANT CHANGE UP (ref 2–14)
NEUTROPHILS # BLD AUTO: 8.75 K/UL — HIGH (ref 1.8–7.4)
NEUTROPHILS NFR BLD AUTO: 93.7 % — HIGH (ref 43–77)
NRBC # BLD: 0 /100 WBCS — SIGNIFICANT CHANGE UP (ref 0–0)
NT-PROBNP SERPL-SCNC: 314 PG/ML — HIGH (ref 0–125)
PLATELET # BLD AUTO: 225 K/UL — SIGNIFICANT CHANGE UP (ref 150–400)
POTASSIUM SERPL-MCNC: 3.9 MMOL/L — SIGNIFICANT CHANGE UP (ref 3.5–5.3)
POTASSIUM SERPL-SCNC: 3.9 MMOL/L — SIGNIFICANT CHANGE UP (ref 3.5–5.3)
PROT SERPL-MCNC: 7.3 GM/DL — SIGNIFICANT CHANGE UP (ref 6–8.3)
PROTHROM AB SERPL-ACNC: 15.8 SEC — HIGH (ref 10–12.9)
RAPID RVP RESULT: DETECTED
RBC # BLD: 3.02 M/UL — LOW (ref 4.2–5.8)
RBC # FLD: 15.9 % — HIGH (ref 10.3–14.5)
SODIUM SERPL-SCNC: 134 MMOL/L — LOW (ref 135–145)
TROPONIN I SERPL-MCNC: <0.015 NG/ML — SIGNIFICANT CHANGE UP (ref 0.01–0.04)
WBC # BLD: 9.34 K/UL — SIGNIFICANT CHANGE UP (ref 3.8–10.5)
WBC # FLD AUTO: 9.34 K/UL — SIGNIFICANT CHANGE UP (ref 3.8–10.5)

## 2019-04-04 PROCEDURE — 93010 ELECTROCARDIOGRAM REPORT: CPT

## 2019-04-04 PROCEDURE — 71045 X-RAY EXAM CHEST 1 VIEW: CPT | Mod: 26

## 2019-04-04 PROCEDURE — 99285 EMERGENCY DEPT VISIT HI MDM: CPT

## 2019-04-04 RX ORDER — CEFEPIME 1 G/1
1000 INJECTION, POWDER, FOR SOLUTION INTRAMUSCULAR; INTRAVENOUS EVERY 12 HOURS
Qty: 0 | Refills: 0 | Status: DISCONTINUED | OUTPATIENT
Start: 2019-04-04 | End: 2019-04-05

## 2019-04-04 RX ORDER — ATORVASTATIN CALCIUM 80 MG/1
40 TABLET, FILM COATED ORAL AT BEDTIME
Qty: 0 | Refills: 0 | Status: DISCONTINUED | OUTPATIENT
Start: 2019-04-04 | End: 2019-04-15

## 2019-04-04 RX ORDER — ALBUTEROL 90 UG/1
2 AEROSOL, METERED ORAL
Qty: 0 | Refills: 0 | COMMUNITY

## 2019-04-04 RX ORDER — GLUCAGON INJECTION, SOLUTION 0.5 MG/.1ML
1 INJECTION, SOLUTION SUBCUTANEOUS ONCE
Qty: 0 | Refills: 0 | Status: DISCONTINUED | OUTPATIENT
Start: 2019-04-04 | End: 2019-04-15

## 2019-04-04 RX ORDER — FENTANYL CITRATE 50 UG/ML
1 INJECTION INTRAVENOUS
Qty: 0 | Refills: 0 | COMMUNITY

## 2019-04-04 RX ORDER — FOLIC ACID 0.8 MG
1 TABLET ORAL
Qty: 0 | Refills: 0 | COMMUNITY

## 2019-04-04 RX ORDER — MONTELUKAST 4 MG/1
1 TABLET, CHEWABLE ORAL
Qty: 0 | Refills: 0 | COMMUNITY

## 2019-04-04 RX ORDER — DEXTROSE 50 % IN WATER 50 %
15 SYRINGE (ML) INTRAVENOUS ONCE
Qty: 0 | Refills: 0 | Status: DISCONTINUED | OUTPATIENT
Start: 2019-04-04 | End: 2019-04-15

## 2019-04-04 RX ORDER — MAGNESIUM OXIDE 400 MG ORAL TABLET 241.3 MG
1 TABLET ORAL
Qty: 0 | Refills: 0 | COMMUNITY

## 2019-04-04 RX ORDER — LOSARTAN POTASSIUM 100 MG/1
1 TABLET, FILM COATED ORAL
Qty: 0 | Refills: 0 | COMMUNITY

## 2019-04-04 RX ORDER — ZOLPIDEM TARTRATE 10 MG/1
5 TABLET ORAL ONCE
Qty: 0 | Refills: 0 | Status: DISCONTINUED | OUTPATIENT
Start: 2019-04-04 | End: 2019-04-04

## 2019-04-04 RX ORDER — SODIUM CHLORIDE 9 MG/ML
1000 INJECTION, SOLUTION INTRAVENOUS
Qty: 0 | Refills: 0 | Status: DISCONTINUED | OUTPATIENT
Start: 2019-04-04 | End: 2019-04-15

## 2019-04-04 RX ORDER — LAMOTRIGINE 25 MG/1
100 TABLET, ORALLY DISINTEGRATING ORAL
Qty: 0 | Refills: 0 | Status: DISCONTINUED | OUTPATIENT
Start: 2019-04-04 | End: 2019-04-13

## 2019-04-04 RX ORDER — VANCOMYCIN HCL 1 G
1000 VIAL (EA) INTRAVENOUS EVERY 12 HOURS
Qty: 0 | Refills: 0 | Status: DISCONTINUED | OUTPATIENT
Start: 2019-04-04 | End: 2019-04-05

## 2019-04-04 RX ORDER — CEFEPIME 1 G/1
1000 INJECTION, POWDER, FOR SOLUTION INTRAMUSCULAR; INTRAVENOUS EVERY 12 HOURS
Qty: 0 | Refills: 0 | Status: DISCONTINUED | OUTPATIENT
Start: 2019-04-04 | End: 2019-04-04

## 2019-04-04 RX ORDER — SODIUM CHLORIDE 9 MG/ML
2000 INJECTION INTRAMUSCULAR; INTRAVENOUS; SUBCUTANEOUS ONCE
Qty: 0 | Refills: 0 | Status: COMPLETED | OUTPATIENT
Start: 2019-04-04 | End: 2019-04-04

## 2019-04-04 RX ORDER — ONDANSETRON 8 MG/1
1 TABLET, FILM COATED ORAL
Qty: 0 | Refills: 0 | COMMUNITY

## 2019-04-04 RX ORDER — APIXABAN 2.5 MG/1
5 TABLET, FILM COATED ORAL EVERY 12 HOURS
Qty: 0 | Refills: 0 | Status: DISCONTINUED | OUTPATIENT
Start: 2019-04-04 | End: 2019-04-11

## 2019-04-04 RX ORDER — SODIUM CHLORIDE 9 MG/ML
1000 INJECTION INTRAMUSCULAR; INTRAVENOUS; SUBCUTANEOUS
Qty: 0 | Refills: 0 | Status: DISCONTINUED | OUTPATIENT
Start: 2019-04-04 | End: 2019-04-07

## 2019-04-04 RX ORDER — ACETAMINOPHEN 500 MG
650 TABLET ORAL EVERY 6 HOURS
Qty: 0 | Refills: 0 | Status: DISCONTINUED | OUTPATIENT
Start: 2019-04-04 | End: 2019-04-15

## 2019-04-04 RX ORDER — DEXTROSE 50 % IN WATER 50 %
12.5 SYRINGE (ML) INTRAVENOUS ONCE
Qty: 0 | Refills: 0 | Status: DISCONTINUED | OUTPATIENT
Start: 2019-04-04 | End: 2019-04-15

## 2019-04-04 RX ORDER — MIRTAZAPINE 45 MG/1
1 TABLET, ORALLY DISINTEGRATING ORAL
Qty: 0 | Refills: 0 | COMMUNITY

## 2019-04-04 RX ORDER — ACETAMINOPHEN 500 MG
975 TABLET ORAL ONCE
Qty: 0 | Refills: 0 | Status: COMPLETED | OUTPATIENT
Start: 2019-04-04 | End: 2019-04-04

## 2019-04-04 RX ORDER — TIOTROPIUM BROMIDE 18 UG/1
1 CAPSULE ORAL; RESPIRATORY (INHALATION) DAILY
Qty: 0 | Refills: 0 | Status: DISCONTINUED | OUTPATIENT
Start: 2019-04-04 | End: 2019-04-15

## 2019-04-04 RX ORDER — OMEPRAZOLE 10 MG/1
1 CAPSULE, DELAYED RELEASE ORAL
Qty: 0 | Refills: 0 | COMMUNITY

## 2019-04-04 RX ORDER — TAMSULOSIN HYDROCHLORIDE 0.4 MG/1
0.4 CAPSULE ORAL AT BEDTIME
Qty: 0 | Refills: 0 | Status: DISCONTINUED | OUTPATIENT
Start: 2019-04-04 | End: 2019-04-15

## 2019-04-04 RX ORDER — INSULIN LISPRO 100/ML
VIAL (ML) SUBCUTANEOUS AT BEDTIME
Qty: 0 | Refills: 0 | Status: DISCONTINUED | OUTPATIENT
Start: 2019-04-04 | End: 2019-04-15

## 2019-04-04 RX ORDER — ONDANSETRON 8 MG/1
4 TABLET, FILM COATED ORAL EVERY 6 HOURS
Qty: 0 | Refills: 0 | Status: DISCONTINUED | OUTPATIENT
Start: 2019-04-04 | End: 2019-04-15

## 2019-04-04 RX ORDER — TAMSULOSIN HYDROCHLORIDE 0.4 MG/1
1 CAPSULE ORAL
Qty: 0 | Refills: 0 | COMMUNITY

## 2019-04-04 RX ORDER — AZTREONAM 2 G
2000 VIAL (EA) INJECTION ONCE
Qty: 0 | Refills: 0 | Status: COMPLETED | OUTPATIENT
Start: 2019-04-04 | End: 2019-04-04

## 2019-04-04 RX ORDER — DEXTROSE 50 % IN WATER 50 %
25 SYRINGE (ML) INTRAVENOUS ONCE
Qty: 0 | Refills: 0 | Status: DISCONTINUED | OUTPATIENT
Start: 2019-04-04 | End: 2019-04-15

## 2019-04-04 RX ORDER — TIOTROPIUM BROMIDE 18 UG/1
1 CAPSULE ORAL; RESPIRATORY (INHALATION)
Qty: 0 | Refills: 0 | COMMUNITY

## 2019-04-04 RX ORDER — INSULIN LISPRO 100/ML
VIAL (ML) SUBCUTANEOUS
Qty: 0 | Refills: 0 | Status: DISCONTINUED | OUTPATIENT
Start: 2019-04-04 | End: 2019-04-15

## 2019-04-04 RX ORDER — ATORVASTATIN CALCIUM 80 MG/1
1 TABLET, FILM COATED ORAL
Qty: 0 | Refills: 0 | COMMUNITY

## 2019-04-04 RX ORDER — FENTANYL CITRATE 50 UG/ML
1 INJECTION INTRAVENOUS
Qty: 0 | Refills: 0 | Status: DISCONTINUED | OUTPATIENT
Start: 2019-04-04 | End: 2019-04-10

## 2019-04-04 RX ORDER — FOLIC ACID 0.8 MG
0 TABLET ORAL
Qty: 0 | Refills: 0 | COMMUNITY

## 2019-04-04 RX ORDER — TRAZODONE HCL 50 MG
4 TABLET ORAL
Qty: 0 | Refills: 0 | COMMUNITY

## 2019-04-04 RX ORDER — MONTELUKAST 4 MG/1
10 TABLET, CHEWABLE ORAL DAILY
Qty: 0 | Refills: 0 | Status: DISCONTINUED | OUTPATIENT
Start: 2019-04-04 | End: 2019-04-15

## 2019-04-04 RX ORDER — VANCOMYCIN HCL 1 G
1000 VIAL (EA) INTRAVENOUS ONCE
Qty: 0 | Refills: 0 | Status: COMPLETED | OUTPATIENT
Start: 2019-04-04 | End: 2019-04-04

## 2019-04-04 RX ADMIN — APIXABAN 5 MILLIGRAM(S): 2.5 TABLET, FILM COATED ORAL at 18:17

## 2019-04-04 RX ADMIN — FENTANYL CITRATE 1 PATCH: 50 INJECTION INTRAVENOUS at 19:01

## 2019-04-04 RX ADMIN — Medication 250 MILLIGRAM(S): at 15:31

## 2019-04-04 RX ADMIN — Medication 100 MILLIGRAM(S): at 15:02

## 2019-04-04 RX ADMIN — Medication 975 MILLIGRAM(S): at 15:02

## 2019-04-04 RX ADMIN — ZOLPIDEM TARTRATE 5 MILLIGRAM(S): 10 TABLET ORAL at 23:37

## 2019-04-04 RX ADMIN — SODIUM CHLORIDE 4000 MILLILITER(S): 9 INJECTION INTRAMUSCULAR; INTRAVENOUS; SUBCUTANEOUS at 15:02

## 2019-04-04 RX ADMIN — CEFEPIME 1000 MILLIGRAM(S): 1 INJECTION, POWDER, FOR SOLUTION INTRAMUSCULAR; INTRAVENOUS at 18:43

## 2019-04-04 RX ADMIN — Medication 100 MILLIGRAM(S): at 23:09

## 2019-04-04 RX ADMIN — TAMSULOSIN HYDROCHLORIDE 0.4 MILLIGRAM(S): 0.4 CAPSULE ORAL at 23:09

## 2019-04-04 RX ADMIN — SODIUM CHLORIDE 100 MILLILITER(S): 9 INJECTION INTRAMUSCULAR; INTRAVENOUS; SUBCUTANEOUS at 18:17

## 2019-04-04 RX ADMIN — FENTANYL CITRATE 1 PATCH: 50 INJECTION INTRAVENOUS at 18:21

## 2019-04-04 RX ADMIN — MONTELUKAST 10 MILLIGRAM(S): 4 TABLET, CHEWABLE ORAL at 23:09

## 2019-04-04 RX ADMIN — LAMOTRIGINE 100 MILLIGRAM(S): 25 TABLET, ORALLY DISINTEGRATING ORAL at 18:17

## 2019-04-04 RX ADMIN — ATORVASTATIN CALCIUM 40 MILLIGRAM(S): 80 TABLET, FILM COATED ORAL at 23:09

## 2019-04-04 NOTE — PHARMACOTHERAPY INTERVENTION NOTE - COMMENTS
med history complete, reviewed medications with patient and family, confirmed meds with doctor first med profile

## 2019-04-04 NOTE — ED ADULT NURSE NOTE - NSIMPLEMENTINTERV_GEN_ALL_ED
Implemented All Fall Risk Interventions:  [place] to call system. Call bell, personal items and telephone within reach. Instruct patient to call for assistance. Room bathroom lighting operational. Non-slip footwear when patient is off stretcher. Physically safe environment: no spills, clutter or unnecessary equipment. Stretcher in lowest position, wheels locked, appropriate side rails in place. Provide visual cue, wrist band, yellow gown, etc. Monitor gait and stability. Monitor for mental status changes and reorient to person, place, and time. Review medications for side effects contributing to fall risk. Reinforce activity limits and safety measures with patient and family.

## 2019-04-04 NOTE — H&P ADULT - NSHPLABSRESULTS_GEN_ALL_CORE
10.4   9.34  )-----------( 225      ( 04 Apr 2019 14:23 )             30.3     04 Apr 2019 14:23    134    |  98     |  14     ----------------------------<  122    3.9     |  25     |  1.36     Ca    9.3        04 Apr 2019 14:23    TPro  7.3    /  Alb  3.0    /  TBili  0.3    /  DBili  x      /  AST  26     /  ALT  16     /  AlkPhos  107    04 Apr 2019 14:23    LIVER FUNCTIONS - ( 04 Apr 2019 14:23 )  Alb: 3.0 g/dL / Pro: 7.3 gm/dL / ALK PHOS: 107 U/L / ALT: 16 U/L / AST: 26 U/L / GGT: x           PT/INR - ( 04 Apr 2019 14:23 )   PT: 15.8 sec;   INR: 1.41 ratio         PTT - ( 04 Apr 2019 14:23 )  PTT:37.3 sec  CAPILLARY BLOOD GLUCOSE        CARDIAC MARKERS ( 04 Apr 2019 14:23 )  <0.015 ng/mL / x     / x     / x     / x

## 2019-04-04 NOTE — H&P ADULT - NSICDXPASTMEDICALHX_GEN_ALL_CORE_FT
PAST MEDICAL HISTORY:  COPD (chronic obstructive pulmonary disease)     Lung cancer     Nephrotic syndrome

## 2019-04-04 NOTE — ED PROVIDER NOTE - ATTENDING CONTRIBUTION TO CARE
I, Lynda Saldaña MD, personally saw the patient with the resident, and completed the key components of the history and physical exam. I then discussed the management plan with the resident.

## 2019-04-04 NOTE — H&P ADULT - NSHPPHYSICALEXAM_GEN_ALL_CORE
Vital Signs Last 24 Hrs  T(C): 39.1 (04 Apr 2019 14:35), Max: 39.1 (04 Apr 2019 14:35)  T(F): 102.4 (04 Apr 2019 14:35), Max: 102.4 (04 Apr 2019 14:35)  HR: 114 (04 Apr 2019 14:13) (114 - 114)  BP: 114/83 (04 Apr 2019 14:13) (114/83 - 114/83)  BP(mean): --  RR: 22 (04 Apr 2019 14:13) (22 - 22)  SpO2: 100% (04 Apr 2019 14:13) (100% - 100%)      · Appearance: ILL APPEARING  · Manner: appropriate for situation  · EYES: Clear bilaterally.  · CARDIAC RHYTHM: regular, S1, S2  · CARDIAC RATE: TACHYCARDIC  · CARDIAC MURMUR: no murmur  · CARDIAC PEDAL EDEMA: absent  · Respiratory Distress: no  · Breath Sounds: DIMINISHED  · Diminished Breath Sounds: RIGHT LOWER LOBE, LEFT LOWER LOBE  · Chest Exam: normal  · GASTROINTESTINAL: Abdomen soft, non-tender, no guarding.  · NEURO LOC: alert  follows commands

## 2019-04-04 NOTE — ED PROVIDER NOTE - PROGRESS NOTE DETAILS
pt seen and examined by resident. d/w resident. agree w/plan. pt seen by me. 63 yo with hx of lung ca on ca, PEs on eliquis, BIB family for weakness and AMS x few days. daughter and grandaughter with flu. pt with temp 102. dec po. pt appeard dehydrated, mild lethargy, no distress. chronically ill appearing. aox2. dec BS bilaterally. will do sepsis w/u including rvp. TBA. D/W hospitalist dr juanita larson. MD MAYKEL

## 2019-04-04 NOTE — H&P ADULT - ASSESSMENT
64yoM hx of metastatic lung cancer mets to brain and bone, S/P whole brain radiation currently on maintenance chemotherapy, H/O PEs on eliquis, pw 1 day of weakness with altered mental status. Per wife, patient seemed confused, not answering questions appropriately today. Weakness has been progressive worse today, associated with decreased food and water intake. + sick contacts at home with the flu. He was recently admitted with pneumonia. He denies any nausea, vomiting, abd pain, diarrhea or chest pain. He was found to have fever of 102F in ED. He received IV vancomycin and azactam in ED.     1. Sepsis  Acute febrile illness  Immunocompromised  patient  Admit to floor  Follow blood cultures  Follow RVP panel  Continue IV antibiotic- vanco and cefepime.  ID eval.  IV fluid.    2. Dehydration  Poor oral intake  Start IV fluid.    3. H/O Metastatic lung cancer.  Outpatient follow up  last chemo was last Monday.    4. H/o PE  Continue eliquis.    5. DM-2  Start ISS.    6. H/O COPD  Continue albuterol l and spiriva

## 2019-04-04 NOTE — H&P ADULT - HISTORY OF PRESENT ILLNESS
64yoM hx of metastatic lung cancer mets to brain and bone, S/P whole brain radiation currently on maintenance chemotherapy, H/O PEs on eliquis, pw 1 day of weakness with altered mental status. Per wife, patient seemed confused, not answering questions appropriately today. Weakness has been progressive worse today, associated with decreased food and water intake. + sick contacts at home with the flu. He was recently admitted with pneumonia. He denies any nausea, vomiting, abd pain, diarrhea or chest pain. He was found to have fever of 102F in ED. He received IV vancomycin and azactam in ED.

## 2019-04-04 NOTE — ED PROVIDER NOTE - OBJECTIVE STATEMENT
64yoM hx of lung ca w mets, currently on maintenance chemotherapy, PEs on eliquis, pw 1 day of weakness with altered mental status. Per wife, patient seemed confused, not answering questions appropriately today. Weakness has been progressive for days, associated with decreased food and water intake. + sick contacts at home with the flu.   + fever, weakness, AMS, cough  per wife, vomiting, diarrhea, chest pain, abd pain, signs of respiratory distress

## 2019-04-04 NOTE — ED STATDOCS - PROGRESS NOTE DETAILS
Adam MILLER for ED attending, Dr. Seth: 65 y/o male with PMHx of COPD, lung CA (last chemo treatment 4/1/19), nephrotic syndrome presents to the ED c/o cough, fever since this morning. Pt's family reports pt was feeling fine yesterday and this morning woke up with a low grade fever and cough. Tmax 102.2. +decreased PO. Allergic to penicillin. Recent dx PNA in November. Will send pt to main ED for further evaluation.

## 2019-04-04 NOTE — ED ADULT NURSE NOTE - OBJECTIVE STATEMENT
Pt presents to ED for fever, cough, weakness, and agitation as per wife. Pt has hx of cancer and is on Chemo. As per wife beginning last night. Pt had cough (non prod), weakness, and fever. Pt presents febrile, agitated and non prod cough. Wife denies giving any meds for fever. Pt denies any major complaints at the moment.

## 2019-04-04 NOTE — ED PROVIDER NOTE - CLINICAL SUMMARY MEDICAL DECISION MAKING FREE TEXT BOX
male on maintenance chemo pw ams, cough and fever. exposure to flu. will send labs, trops with abnormal EKG, cultures, rvp, urine, cxr and give fluids, Tylenol and empiric antibiotics. will admit as patient high risk for bacteremia and clinical deterioration.

## 2019-04-04 NOTE — PROVIDER CONTACT NOTE (OTHER) - DATE AND TIME:
Called and confirmed appointment with patient for Wed 10/25/17 at 8:00a, and scheduled DM Education on Fri 11/3/17 at 11:00a. Patient verbalized understanding. Reminder letter given to patient.   04-Apr-2019 19:58

## 2019-04-05 LAB
ANION GAP SERPL CALC-SCNC: 8 MMOL/L — SIGNIFICANT CHANGE UP (ref 5–17)
APPEARANCE UR: CLEAR — SIGNIFICANT CHANGE UP
BACTERIA # UR AUTO: ABNORMAL
BILIRUB UR-MCNC: NEGATIVE — SIGNIFICANT CHANGE UP
BUN SERPL-MCNC: 10 MG/DL — SIGNIFICANT CHANGE UP (ref 7–23)
CALCIUM SERPL-MCNC: 8 MG/DL — LOW (ref 8.5–10.1)
CHLORIDE SERPL-SCNC: 103 MMOL/L — SIGNIFICANT CHANGE UP (ref 96–108)
CO2 SERPL-SCNC: 23 MMOL/L — SIGNIFICANT CHANGE UP (ref 22–31)
COLOR SPEC: YELLOW — SIGNIFICANT CHANGE UP
CREAT SERPL-MCNC: 1.02 MG/DL — SIGNIFICANT CHANGE UP (ref 0.5–1.3)
DIFF PNL FLD: ABNORMAL
EPI CELLS # UR: SIGNIFICANT CHANGE UP
GLUCOSE BLDC GLUCOMTR-MCNC: 106 MG/DL — HIGH (ref 70–99)
GLUCOSE BLDC GLUCOMTR-MCNC: 73 MG/DL — SIGNIFICANT CHANGE UP (ref 70–99)
GLUCOSE BLDC GLUCOMTR-MCNC: 91 MG/DL — SIGNIFICANT CHANGE UP (ref 70–99)
GLUCOSE BLDC GLUCOMTR-MCNC: 97 MG/DL — SIGNIFICANT CHANGE UP (ref 70–99)
GLUCOSE SERPL-MCNC: 90 MG/DL — SIGNIFICANT CHANGE UP (ref 70–99)
GLUCOSE UR QL: NEGATIVE MG/DL — SIGNIFICANT CHANGE UP
HBA1C BLD-MCNC: 6.2 % — HIGH (ref 4–5.6)
HCT VFR BLD CALC: 23.6 % — LOW (ref 39–50)
HCV AB S/CO SERPL IA: 0.22 S/CO — SIGNIFICANT CHANGE UP (ref 0–0.99)
HCV AB SERPL-IMP: SIGNIFICANT CHANGE UP
HGB BLD-MCNC: 7.9 G/DL — LOW (ref 13–17)
KETONES UR-MCNC: NEGATIVE — SIGNIFICANT CHANGE UP
LEUKOCYTE ESTERASE UR-ACNC: ABNORMAL
MCHC RBC-ENTMCNC: 33.5 GM/DL — SIGNIFICANT CHANGE UP (ref 32–36)
MCHC RBC-ENTMCNC: 33.6 PG — SIGNIFICANT CHANGE UP (ref 27–34)
MCV RBC AUTO: 100.4 FL — HIGH (ref 80–100)
NITRITE UR-MCNC: NEGATIVE — SIGNIFICANT CHANGE UP
NRBC # BLD: 0 /100 WBCS — SIGNIFICANT CHANGE UP (ref 0–0)
PH UR: 6 — SIGNIFICANT CHANGE UP (ref 5–8)
PLATELET # BLD AUTO: 167 K/UL — SIGNIFICANT CHANGE UP (ref 150–400)
POTASSIUM SERPL-MCNC: 3.5 MMOL/L — SIGNIFICANT CHANGE UP (ref 3.5–5.3)
POTASSIUM SERPL-SCNC: 3.5 MMOL/L — SIGNIFICANT CHANGE UP (ref 3.5–5.3)
PROT UR-MCNC: 500 MG/DL
RBC # BLD: 2.35 M/UL — LOW (ref 4.2–5.8)
RBC # FLD: 16.2 % — HIGH (ref 10.3–14.5)
RBC CASTS # UR COMP ASSIST: SIGNIFICANT CHANGE UP /HPF (ref 0–4)
SODIUM SERPL-SCNC: 134 MMOL/L — LOW (ref 135–145)
SP GR SPEC: 1.02 — SIGNIFICANT CHANGE UP (ref 1.01–1.02)
UROBILINOGEN FLD QL: NEGATIVE MG/DL — SIGNIFICANT CHANGE UP
WBC # BLD: 5.06 K/UL — SIGNIFICANT CHANGE UP (ref 3.8–10.5)
WBC # FLD AUTO: 5.06 K/UL — SIGNIFICANT CHANGE UP (ref 3.8–10.5)
WBC UR QL: SIGNIFICANT CHANGE UP

## 2019-04-05 RX ORDER — ALPRAZOLAM 0.25 MG
0.5 TABLET ORAL EVERY 8 HOURS
Qty: 0 | Refills: 0 | Status: DISCONTINUED | OUTPATIENT
Start: 2019-04-05 | End: 2019-04-12

## 2019-04-05 RX ORDER — ZOLPIDEM TARTRATE 10 MG/1
5 TABLET ORAL AT BEDTIME
Qty: 0 | Refills: 0 | Status: DISCONTINUED | OUTPATIENT
Start: 2019-04-05 | End: 2019-04-05

## 2019-04-05 RX ORDER — ALPRAZOLAM 0.25 MG
0.5 TABLET ORAL ONCE
Qty: 0 | Refills: 0 | Status: DISCONTINUED | OUTPATIENT
Start: 2019-04-05 | End: 2019-04-05

## 2019-04-05 RX ADMIN — TIOTROPIUM BROMIDE 1 CAPSULE(S): 18 CAPSULE ORAL; RESPIRATORY (INHALATION) at 08:44

## 2019-04-05 RX ADMIN — Medication 0.5 MILLIGRAM(S): at 17:50

## 2019-04-05 RX ADMIN — ZOLPIDEM TARTRATE 5 MILLIGRAM(S): 10 TABLET ORAL at 22:22

## 2019-04-05 RX ADMIN — SODIUM CHLORIDE 100 MILLILITER(S): 9 INJECTION INTRAMUSCULAR; INTRAVENOUS; SUBCUTANEOUS at 03:42

## 2019-04-05 RX ADMIN — Medication 250 MILLIGRAM(S): at 03:39

## 2019-04-05 RX ADMIN — ATORVASTATIN CALCIUM 40 MILLIGRAM(S): 80 TABLET, FILM COATED ORAL at 21:35

## 2019-04-05 RX ADMIN — SODIUM CHLORIDE 100 MILLILITER(S): 9 INJECTION INTRAMUSCULAR; INTRAVENOUS; SUBCUTANEOUS at 13:20

## 2019-04-05 RX ADMIN — Medication 75 MILLIGRAM(S): at 17:51

## 2019-04-05 RX ADMIN — APIXABAN 5 MILLIGRAM(S): 2.5 TABLET, FILM COATED ORAL at 17:51

## 2019-04-05 RX ADMIN — LAMOTRIGINE 100 MILLIGRAM(S): 25 TABLET, ORALLY DISINTEGRATING ORAL at 05:15

## 2019-04-05 RX ADMIN — CEFEPIME 1000 MILLIGRAM(S): 1 INJECTION, POWDER, FOR SOLUTION INTRAMUSCULAR; INTRAVENOUS at 05:15

## 2019-04-05 RX ADMIN — FENTANYL CITRATE 1 PATCH: 50 INJECTION INTRAVENOUS at 07:12

## 2019-04-05 RX ADMIN — Medication 0.5 MILLIGRAM(S): at 09:36

## 2019-04-05 RX ADMIN — Medication 100 MILLIGRAM(S): at 05:16

## 2019-04-05 RX ADMIN — FENTANYL CITRATE 1 PATCH: 50 INJECTION INTRAVENOUS at 19:07

## 2019-04-05 RX ADMIN — TAMSULOSIN HYDROCHLORIDE 0.4 MILLIGRAM(S): 0.4 CAPSULE ORAL at 21:35

## 2019-04-05 RX ADMIN — APIXABAN 5 MILLIGRAM(S): 2.5 TABLET, FILM COATED ORAL at 05:16

## 2019-04-05 RX ADMIN — Medication 100 MILLIGRAM(S): at 13:20

## 2019-04-05 RX ADMIN — LAMOTRIGINE 100 MILLIGRAM(S): 25 TABLET, ORALLY DISINTEGRATING ORAL at 17:51

## 2019-04-05 RX ADMIN — Medication 75 MILLIGRAM(S): at 09:36

## 2019-04-05 RX ADMIN — MONTELUKAST 10 MILLIGRAM(S): 4 TABLET, CHEWABLE ORAL at 21:35

## 2019-04-05 NOTE — PROVIDER CONTACT NOTE (OTHER) - SITUATION
pt w/ increased agitation/confusion. pt attempting to pull out abraham/IV. pt attempting to get out of bed

## 2019-04-05 NOTE — PROVIDER CONTACT NOTE (OTHER) - SITUATION
Pt bladder scanned again at 330mL. Last straight cath drained 700mL. Pt unable to void. IVF NS at 100mL/hr

## 2019-04-06 LAB
ANION GAP SERPL CALC-SCNC: 11 MMOL/L — SIGNIFICANT CHANGE UP (ref 5–17)
BUN SERPL-MCNC: 6 MG/DL — LOW (ref 7–23)
C DIFF BY PCR RESULT: DETECTED
C DIFF TOX GENS STL QL NAA+PROBE: SIGNIFICANT CHANGE UP
CALCIUM SERPL-MCNC: 8.3 MG/DL — LOW (ref 8.5–10.1)
CHLORIDE SERPL-SCNC: 107 MMOL/L — SIGNIFICANT CHANGE UP (ref 96–108)
CO2 SERPL-SCNC: 21 MMOL/L — LOW (ref 22–31)
CREAT SERPL-MCNC: 0.93 MG/DL — SIGNIFICANT CHANGE UP (ref 0.5–1.3)
CULTURE RESULTS: NO GROWTH — SIGNIFICANT CHANGE UP
GLUCOSE BLDC GLUCOMTR-MCNC: 103 MG/DL — HIGH (ref 70–99)
GLUCOSE BLDC GLUCOMTR-MCNC: 83 MG/DL — SIGNIFICANT CHANGE UP (ref 70–99)
GLUCOSE BLDC GLUCOMTR-MCNC: 86 MG/DL — SIGNIFICANT CHANGE UP (ref 70–99)
GLUCOSE BLDC GLUCOMTR-MCNC: 99 MG/DL — SIGNIFICANT CHANGE UP (ref 70–99)
GLUCOSE SERPL-MCNC: 77 MG/DL — SIGNIFICANT CHANGE UP (ref 70–99)
HCT VFR BLD CALC: 24.8 % — LOW (ref 39–50)
HGB BLD-MCNC: 8.3 G/DL — LOW (ref 13–17)
MCHC RBC-ENTMCNC: 33.5 GM/DL — SIGNIFICANT CHANGE UP (ref 32–36)
MCHC RBC-ENTMCNC: 33.9 PG — SIGNIFICANT CHANGE UP (ref 27–34)
MCV RBC AUTO: 101.2 FL — HIGH (ref 80–100)
NRBC # BLD: 0 /100 WBCS — SIGNIFICANT CHANGE UP (ref 0–0)
PLATELET # BLD AUTO: 143 K/UL — LOW (ref 150–400)
POTASSIUM SERPL-MCNC: 3.6 MMOL/L — SIGNIFICANT CHANGE UP (ref 3.5–5.3)
POTASSIUM SERPL-SCNC: 3.6 MMOL/L — SIGNIFICANT CHANGE UP (ref 3.5–5.3)
RBC # BLD: 2.45 M/UL — LOW (ref 4.2–5.8)
RBC # FLD: 15.9 % — HIGH (ref 10.3–14.5)
SODIUM SERPL-SCNC: 139 MMOL/L — SIGNIFICANT CHANGE UP (ref 135–145)
SPECIMEN SOURCE: SIGNIFICANT CHANGE UP
WBC # BLD: 4.47 K/UL — SIGNIFICANT CHANGE UP (ref 3.8–10.5)
WBC # FLD AUTO: 4.47 K/UL — SIGNIFICANT CHANGE UP (ref 3.8–10.5)

## 2019-04-06 RX ORDER — HALOPERIDOL DECANOATE 100 MG/ML
2 INJECTION INTRAMUSCULAR EVERY 6 HOURS
Qty: 0 | Refills: 0 | Status: DISCONTINUED | OUTPATIENT
Start: 2019-04-06 | End: 2019-04-15

## 2019-04-06 RX ORDER — FINASTERIDE 5 MG/1
5 TABLET, FILM COATED ORAL DAILY
Qty: 0 | Refills: 0 | Status: DISCONTINUED | OUTPATIENT
Start: 2019-04-06 | End: 2019-04-15

## 2019-04-06 RX ORDER — VANCOMYCIN HCL 1 G
125 VIAL (EA) INTRAVENOUS EVERY 6 HOURS
Qty: 0 | Refills: 0 | Status: DISCONTINUED | OUTPATIENT
Start: 2019-04-06 | End: 2019-04-13

## 2019-04-06 RX ADMIN — Medication 75 MILLIGRAM(S): at 05:32

## 2019-04-06 RX ADMIN — APIXABAN 5 MILLIGRAM(S): 2.5 TABLET, FILM COATED ORAL at 17:21

## 2019-04-06 RX ADMIN — LAMOTRIGINE 100 MILLIGRAM(S): 25 TABLET, ORALLY DISINTEGRATING ORAL at 17:21

## 2019-04-06 RX ADMIN — Medication 100 MILLIGRAM(S): at 05:32

## 2019-04-06 RX ADMIN — Medication 75 MILLIGRAM(S): at 17:21

## 2019-04-06 RX ADMIN — Medication 0.5 MILLIGRAM(S): at 12:09

## 2019-04-06 RX ADMIN — Medication 0.5 MILLIGRAM(S): at 23:02

## 2019-04-06 RX ADMIN — MONTELUKAST 10 MILLIGRAM(S): 4 TABLET, CHEWABLE ORAL at 23:02

## 2019-04-06 RX ADMIN — LAMOTRIGINE 100 MILLIGRAM(S): 25 TABLET, ORALLY DISINTEGRATING ORAL at 05:32

## 2019-04-06 RX ADMIN — ATORVASTATIN CALCIUM 40 MILLIGRAM(S): 80 TABLET, FILM COATED ORAL at 23:02

## 2019-04-06 RX ADMIN — TIOTROPIUM BROMIDE 1 CAPSULE(S): 18 CAPSULE ORAL; RESPIRATORY (INHALATION) at 09:02

## 2019-04-06 RX ADMIN — FENTANYL CITRATE 1 PATCH: 50 INJECTION INTRAVENOUS at 20:00

## 2019-04-06 RX ADMIN — FENTANYL CITRATE 1 PATCH: 50 INJECTION INTRAVENOUS at 07:58

## 2019-04-06 RX ADMIN — Medication 100 MILLIGRAM(S): at 23:02

## 2019-04-06 RX ADMIN — SODIUM CHLORIDE 100 MILLILITER(S): 9 INJECTION INTRAMUSCULAR; INTRAVENOUS; SUBCUTANEOUS at 13:58

## 2019-04-06 RX ADMIN — SODIUM CHLORIDE 100 MILLILITER(S): 9 INJECTION INTRAMUSCULAR; INTRAVENOUS; SUBCUTANEOUS at 05:31

## 2019-04-06 RX ADMIN — FINASTERIDE 5 MILLIGRAM(S): 5 TABLET, FILM COATED ORAL at 12:09

## 2019-04-06 RX ADMIN — TAMSULOSIN HYDROCHLORIDE 0.4 MILLIGRAM(S): 0.4 CAPSULE ORAL at 23:02

## 2019-04-06 RX ADMIN — APIXABAN 5 MILLIGRAM(S): 2.5 TABLET, FILM COATED ORAL at 05:32

## 2019-04-06 NOTE — CHART NOTE - NSCHARTNOTEFT_GEN_A_CORE
Upon Nutritional Assessment by the Registered Dietitian your patient was determined to meet criteria / has evidence of the following diagnosis/diagnoses:          [ ]  Mild Protein Calorie Malnutrition        [ ]  Moderate Protein Calorie Malnutrition        [x ] Severe Protein Calorie Malnutrition        [ ] Unspecified Protein Calorie Malnutrition        [ ] Underweight / BMI <19        [ ] Morbid Obesity / BMI > 40      Findings as based on:  •  Comprehensive nutrition assessment and consultation  •  Calorie counts (nutrient intake analysis)  •  Food acceptance and intake status from observations by staff  •  Follow up  •  Patient education  •  Intervention secondary to interdisciplinary rounds  •   concerns      Treatment:      1) continue to encourage po diet when alert and able.   2) maintain aspiration precautions head of bed >30 degrees.   3) Palliative care consult for GOC.   4) additional supplementation Ensure enlive 8oz po BID and Gelatein Plus jello po TID (60gm protein).   5) monitor labs and electrolytes- continue with hydration as pt not consuming sufficient po fluids at this time.      ***BMI 22.6 Unable to perform NFPE due to confusion/agitation however visual severe muscle noted (temporal and clavicle). Severe fat loss: Ocular and buccal.Pt meets criteria for severe protein/calorie malnutrition in context of chronic illness secondary to significant weight loss, severe fat/muscle wasting, and poor po intake.       PROVIDER Section:     By signing this assessment you are acknowledging and agree with the diagnosis/diagnoses assigned by the Registered Dietitian    Comments:

## 2019-04-06 NOTE — DIETITIAN INITIAL EVALUATION ADULT. - ENERGY NEEDS
Ht.  66    "        Wt. 139.7   #              BMI     22.6                #               Pt is at  98  %  IBW

## 2019-04-06 NOTE — DIETITIAN INITIAL EVALUATION ADULT. - PERTINENT LABORATORY DATA
04-06 Na139 mmol/L Glu 77 mg/dL K+ 3.6 mmol/L Cr  0.93 mg/dL BUN 6 mg/dL<L> Phos n/a   Alb n/a   PAB n/a

## 2019-04-06 NOTE — DIETITIAN INITIAL EVALUATION ADULT. - NUTRITION INTERVENTION
Medical Food Supplements/Meals and Snack/Collaboration and Referral of Nutrition Care/Feeding Assistance

## 2019-04-06 NOTE — DIETITIAN INITIAL EVALUATION ADULT. - PHYSICAL APPEARANCE
underweight/BMI 22.6 Unable to perform NFPE due to confusion/agitation however visual severe muscle noted (temporal and clavicle). Severe fat loss: Ocular and buccal.

## 2019-04-06 NOTE — CDI QUERY NOTE - NSCDIOTHERTXTBX_GEN_ALL_CORE_HH
Altered Mental Status is a non-specific term. Can you further specify the condition to a more specific diagnosis? And is that the diagnosis that you have made to the underlying cause?   SUPPORTING DOCUMENTATION AND/OR CLINICAL EVIDENCE:  p/w influenza, fever 102.4  weakness/decreased po intake  Hx of lung Ca with brain mets  PN states "pw 1 day of weakness with altered mental status. Per wife, patient seemed confused, not answering questions appropriately today."    Document etiology of the altered mental status such as:     Metabolic Encephalopathy present on admission    Metabolic Encephalopathy not present     other/not clinically significant

## 2019-04-06 NOTE — DIETITIAN INITIAL EVALUATION ADULT. - OTHER INFO
Nutrition consult for poor po intake > 5 days PTA. Recently admitted with PNA. Current admission-Pt is a 63yo male with Sepsis, AMS, Influenza A3. History of Lung Ca mets to brain and bone. S/P whole brain radiation and currently on chemo tx (last chemo on Monday 4/1/19). Nephrotic syndrome, DM2, spinal fusion, COPD. Pt unable to provide any information confused and combative. Pt lethargic and sedated during visit due to aggressive and combative behavior for safety. 1:1 aide at bedside for observation/safety. PO intake since admission (x 3 days)<25% of ENN. Catheter urethral placed with 800ml and prior to catheter voided 800ml-1600ml/24h. Last BM 4/6/19- x 2 (1-loose). Skin intact with +2 edema documented. Steven score= 16 (high risk of skin breakdown). Pt unable to consume breakfast due to sedation, however Aide will attempt to feed pt when alert and willing. MOLST form indicates +DNR/DNI. No decisions made in MOLST regarding alternate route of nutrition. BMI 22.6 Unable to perform NFPE due to confusion/agitation however visual severe muscle noted (temporal and clavicle). Severe fat loss: Ocular and buccal.Pt meets criteria for severe protein/calorie malnutrition in context of chronic illness secondary to significant weight loss, severe fat/muscle wasting, and poor po intake. Recommendations: 1) continue to encourage po diet when alert and able. 2) maintain aspiration precautions head of bed >30 degrees. 3) Palliative care consult for GOC. 4) additional supplementation Ensure enlive 8oz po BID and Gelatein Plus jello po TID (60gm protein). 5) monitor labs and electrolytes- continue with hydration as pt not consuming sufficient po fluids at this time.

## 2019-04-06 NOTE — DIETITIAN INITIAL EVALUATION ADULT. - PERTINENT MEDS FT
MEDICATIONS  (STANDING):  apixaban 5 milliGRAM(s) Oral every 12 hours  atorvastatin 40 milliGRAM(s) Oral at bedtime  benzonatate 100 milliGRAM(s) Oral three times a day  dextrose 5%. 1000 milliLiter(s) (50 mL/Hr) IV Continuous <Continuous>  dextrose 50% Injectable 12.5 Gram(s) IV Push once  dextrose 50% Injectable 25 Gram(s) IV Push once  dextrose 50% Injectable 25 Gram(s) IV Push once  fentaNYL   Patch  25 MICROgram(s)/Hr 1 Patch Transdermal every 72 hours  finasteride 5 milliGRAM(s) Oral daily  insulin lispro (HumaLOG) corrective regimen sliding scale   SubCutaneous three times a day before meals  insulin lispro (HumaLOG) corrective regimen sliding scale   SubCutaneous at bedtime  lamoTRIgine 100 milliGRAM(s) Oral two times a day  montelukast 10 milliGRAM(s) Oral daily  oseltamivir 75 milliGRAM(s) Oral two times a day  sodium chloride 0.9%. 1000 milliLiter(s) (100 mL/Hr) IV Continuous <Continuous>  tamsulosin 0.4 milliGRAM(s) Oral at bedtime  tiotropium 18 MICROgram(s) Capsule 1 Capsule(s) Inhalation daily    MEDICATIONS  (PRN):  acetaminophen   Tablet .. 650 milliGRAM(s) Oral every 6 hours PRN Temp greater or equal to 38C (100.4F), Mild Pain (1 - 3)  ALPRAZolam 0.5 milliGRAM(s) Oral every 8 hours PRN anxiety  dextrose 40% Gel 15 Gram(s) Oral once PRN Blood Glucose LESS THAN 70 milliGRAM(s)/deciliter  glucagon  Injectable 1 milliGRAM(s) IntraMuscular once PRN Glucose LESS THAN 70 milligrams/deciliter  haloperidol    Injectable 2 milliGRAM(s) IntraMuscular every 6 hours PRN Agitation  ondansetron Injectable 4 milliGRAM(s) IV Push every 6 hours PRN Nausea

## 2019-04-06 NOTE — CHART NOTE - NSCHARTNOTEFT_GEN_A_CORE
Called by RN, pt had 3 loose bowel movements.    Vital Signs Last 24 Hrs  T(C): 37.4 (05 Apr 2019 14:57), Max: 37.4 (05 Apr 2019 14:57)  T(F): 99.3 (05 Apr 2019 14:57), Max: 99.3 (05 Apr 2019 14:57)  HR: 88 (05 Apr 2019 14:57) (76 - 107)  BP: 132/72 (05 Apr 2019 14:57) (111/59 - 132/72)  BP(mean): --  RR: 18 (05 Apr 2019 14:57) (18 - 19)  SpO2: 97% (05 Apr 2019 14:57) (95% - 98%)      A/P-    64yoM hx of metastatic lung cancer mets to brain and bone, S/P whole brain radiation currently on maintenance chemotherapy, H/O PEs on eliquis admitted  for weakness, altered mental status and febrile illness    #Loose stool  -not on antibiotics  -C Diff PCR  -GI PCR   -Continue IVF at 100 cc/hr  -Isolation precaution  -Continue to monitor

## 2019-04-07 LAB
CULTURE RESULTS: SIGNIFICANT CHANGE UP
GLUCOSE BLDC GLUCOMTR-MCNC: 102 MG/DL — HIGH (ref 70–99)
GLUCOSE BLDC GLUCOMTR-MCNC: 105 MG/DL — HIGH (ref 70–99)
GLUCOSE BLDC GLUCOMTR-MCNC: 109 MG/DL — HIGH (ref 70–99)
GLUCOSE BLDC GLUCOMTR-MCNC: 91 MG/DL — SIGNIFICANT CHANGE UP (ref 70–99)
SPECIMEN SOURCE: SIGNIFICANT CHANGE UP

## 2019-04-07 RX ORDER — ZOLPIDEM TARTRATE 10 MG/1
5 TABLET ORAL ONCE
Qty: 0 | Refills: 0 | Status: DISCONTINUED | OUTPATIENT
Start: 2019-04-07 | End: 2019-04-07

## 2019-04-07 RX ADMIN — SODIUM CHLORIDE 100 MILLILITER(S): 9 INJECTION INTRAMUSCULAR; INTRAVENOUS; SUBCUTANEOUS at 00:57

## 2019-04-07 RX ADMIN — SODIUM CHLORIDE 100 MILLILITER(S): 9 INJECTION INTRAMUSCULAR; INTRAVENOUS; SUBCUTANEOUS at 11:02

## 2019-04-07 RX ADMIN — FINASTERIDE 5 MILLIGRAM(S): 5 TABLET, FILM COATED ORAL at 11:03

## 2019-04-07 RX ADMIN — ATORVASTATIN CALCIUM 40 MILLIGRAM(S): 80 TABLET, FILM COATED ORAL at 21:50

## 2019-04-07 RX ADMIN — APIXABAN 5 MILLIGRAM(S): 2.5 TABLET, FILM COATED ORAL at 05:02

## 2019-04-07 RX ADMIN — Medication 100 MILLIGRAM(S): at 14:24

## 2019-04-07 RX ADMIN — ZOLPIDEM TARTRATE 5 MILLIGRAM(S): 10 TABLET ORAL at 21:50

## 2019-04-07 RX ADMIN — Medication 125 MILLIGRAM(S): at 11:02

## 2019-04-07 RX ADMIN — Medication 100 MILLIGRAM(S): at 21:51

## 2019-04-07 RX ADMIN — FENTANYL CITRATE 1 PATCH: 50 INJECTION INTRAVENOUS at 08:05

## 2019-04-07 RX ADMIN — Medication 125 MILLIGRAM(S): at 17:27

## 2019-04-07 RX ADMIN — LAMOTRIGINE 100 MILLIGRAM(S): 25 TABLET, ORALLY DISINTEGRATING ORAL at 05:02

## 2019-04-07 RX ADMIN — TIOTROPIUM BROMIDE 1 CAPSULE(S): 18 CAPSULE ORAL; RESPIRATORY (INHALATION) at 08:28

## 2019-04-07 RX ADMIN — FENTANYL CITRATE 1 PATCH: 50 INJECTION INTRAVENOUS at 17:27

## 2019-04-07 RX ADMIN — Medication 125 MILLIGRAM(S): at 00:57

## 2019-04-07 RX ADMIN — APIXABAN 5 MILLIGRAM(S): 2.5 TABLET, FILM COATED ORAL at 17:27

## 2019-04-07 RX ADMIN — MONTELUKAST 10 MILLIGRAM(S): 4 TABLET, CHEWABLE ORAL at 21:50

## 2019-04-07 RX ADMIN — LAMOTRIGINE 100 MILLIGRAM(S): 25 TABLET, ORALLY DISINTEGRATING ORAL at 17:27

## 2019-04-07 RX ADMIN — Medication 75 MILLIGRAM(S): at 05:02

## 2019-04-07 RX ADMIN — TAMSULOSIN HYDROCHLORIDE 0.4 MILLIGRAM(S): 0.4 CAPSULE ORAL at 21:50

## 2019-04-07 RX ADMIN — FENTANYL CITRATE 1 PATCH: 50 INJECTION INTRAVENOUS at 19:58

## 2019-04-07 RX ADMIN — Medication 100 MILLIGRAM(S): at 05:02

## 2019-04-07 RX ADMIN — Medication 75 MILLIGRAM(S): at 17:27

## 2019-04-07 RX ADMIN — Medication 125 MILLIGRAM(S): at 05:02

## 2019-04-07 NOTE — CONSULT NOTE ADULT - SUBJECTIVE AND OBJECTIVE BOX
Patient is a 64y old  Male who presents with a chief complaint of Fever, weakness (2019 14:48)    HPI:  63 y/o Male with h/o metastatic lung cancer with mets to brain and bone s/p whole brain radiation, on maintenance chemotherapy, prior PE on eliquis, was admitted on  for increased weakness and altered mental status. The patient was more confused, not answering questions appropriately. Weakness has been progressive worse, associated with decreased food and water intake. In ER, he was found with fever to 102F. He received IV vancomycin and azactam, then oseltamivir.    He was noted with diarrhea and a stool for CDT is positive.    PMH: as above  PSH: as above  Meds: per reconciliation sheet, noted below  MEDICATIONS  (STANDING):  apixaban 5 milliGRAM(s) Oral every 12 hours  atorvastatin 40 milliGRAM(s) Oral at bedtime  benzonatate 100 milliGRAM(s) Oral three times a day  dextrose 5%. 1000 milliLiter(s) (50 mL/Hr) IV Continuous <Continuous>  dextrose 50% Injectable 12.5 Gram(s) IV Push once  dextrose 50% Injectable 25 Gram(s) IV Push once  dextrose 50% Injectable 25 Gram(s) IV Push once  fentaNYL   Patch  25 MICROgram(s)/Hr 1 Patch Transdermal every 72 hours  finasteride 5 milliGRAM(s) Oral daily  insulin lispro (HumaLOG) corrective regimen sliding scale   SubCutaneous three times a day before meals  insulin lispro (HumaLOG) corrective regimen sliding scale   SubCutaneous at bedtime  lamoTRIgine 100 milliGRAM(s) Oral two times a day  montelukast 10 milliGRAM(s) Oral daily  oseltamivir 75 milliGRAM(s) Oral two times a day  tamsulosin 0.4 milliGRAM(s) Oral at bedtime  tiotropium 18 MICROgram(s) Capsule 1 Capsule(s) Inhalation daily  vancomycin    Solution 125 milliGRAM(s) Oral every 6 hours    MEDICATIONS  (PRN):  acetaminophen   Tablet .. 650 milliGRAM(s) Oral every 6 hours PRN Temp greater or equal to 38C (100.4F), Mild Pain (1 - 3)  ALPRAZolam 0.5 milliGRAM(s) Oral every 8 hours PRN anxiety  dextrose 40% Gel 15 Gram(s) Oral once PRN Blood Glucose LESS THAN 70 milliGRAM(s)/deciliter  glucagon  Injectable 1 milliGRAM(s) IntraMuscular once PRN Glucose LESS THAN 70 milligrams/deciliter  haloperidol    Injectable 2 milliGRAM(s) IntraMuscular every 6 hours PRN Agitation  ondansetron Injectable 4 milliGRAM(s) IV Push every 6 hours PRN Nausea    Allergies    penicillin (Unknown)    Intolerances      Social: no smoking, no alcohol, no illegal drugs; no recent travel, no exposure to TB  FAMILY HISTORY:  No pertinent family history in first degree relatives    no history of premature cardiovascular disease in first degree relatives    ROS: the patient is confused; limited  All other systems reviewed and are negative    Vital Signs Last 24 Hrs  T(C): 36.5 (2019 13:15), Max: 36.8 (2019 20:35)  T(F): 97.7 (2019 13:15), Max: 98.2 (2019 20:35)  HR: 93 (2019 13:15) (93 - 107)  BP: 111/65 (2019 13:15) (111/65 - 132/77)  BP(mean): --  RR: 17 (2019 13:15) (17 - 18)  SpO2: 100% (2019 13:15) (97% - 100%)  Daily     Daily Weight in k.3 (2019 16:15)    PE:    Constitutional: frail looking  HEENT: NC/AT, EOMI, PERRLA, conjunctivae clear; ears and nose atraumatic; pharynx benign  Neck: supple; thyroid not palpable  Back: no tenderness  Respiratory: respiratory effort normal; decreased BS at bases  Cardiovascular: S1S2 regular, no murmurs  Abdomen: soft, not tender, not distended, positive BS; no liver or spleen organomegaly  Genitourinary: no suprapubic tenderness  Lymphatic: no LN palpable  Musculoskeletal: no muscle tenderness, no joint swelling or tenderness  Extremities: no pedal edema  Neurological/ Psychiatric: AxOx3, judgement and insight normal; moving all extremities  Skin: no rashes; no palpable lesions    Labs: all available labs reviewed                        8.3    4.47  )-----------( 143      ( 2019 05:51 )             24.8     04-06    139  |  107  |  6<L>  ----------------------------<  77  3.6   |  21<L>  |  0.93    Ca    8.3<L>      2019 05:51      Culture Results:   GI PCR Results: NOT detected  *******Please Note:*******  GI panel PCR evaluates for:  Campylobacter, Plesiomonas shigelloides, Salmonella,  Vibrio, Yersinia enterocolitica, Enteroaggregative  Escherichia coli (EAEC), Enteropathogenic E.coli (EPEC),  Enterotoxigenic E. coli (ETEC) lt/st, Shiga-like  toxin-producing E. coli (STEC) stx1/stx2,  Shigella/ Enteroinvasive E. coli (EIEC), Cryptosporidium,  Cyclospora cayetanensis, Entamoeba histolytica,  Giardia lamblia, Adenovirus F 40/41, Astrovirus,  Norovirus GI/GII, Rotavirus A, Sapovirus ( @ 17:00)    Clostridium difficile Toxin by PCR (19 @ 03:00)    C Diff by PCR Result: Detected    Rapid Respiratory Viral Panel (19 @ 14:37)    Rapid RVP Result: Detected    Influenza AH3 (RapRVP): Detected: spoke to nurse janice artis 19 16:37        Radiology: all available radiological tests reviewed    < from: Xray Chest 1 View-PORTABLE IMMEDIATE (19 @ 14:51) >  Stable very small left pleural effusion. No focal consolidation.   Cardiomediastinal silhouette is intact.      < end of copied text >      Advanced directives addressed: full resuscitation Patient is a 64y old  Male who presents with a chief complaint of Fever, weakness (2019 14:48)    HPI:  65 y/o Male with h/o metastatic lung cancer with mets to brain and bone s/p whole brain radiation, on maintenance chemotherapy, prior PE on eliquis, was admitted on  for increased weakness and altered mental status. The patient was more confused, not answering questions appropriately. Weakness has been progressive worse, associated with decreased food and water intake. In ER, he was found with fever to 102F. He received IV vancomycin and azactam, then oseltamivir.    He was noted with diarrhea and a stool for CDT is positive.    PMH: as above  PSH: as above  Meds: per reconciliation sheet, noted below  MEDICATIONS  (STANDING):  apixaban 5 milliGRAM(s) Oral every 12 hours  atorvastatin 40 milliGRAM(s) Oral at bedtime  benzonatate 100 milliGRAM(s) Oral three times a day  dextrose 5%. 1000 milliLiter(s) (50 mL/Hr) IV Continuous <Continuous>  dextrose 50% Injectable 12.5 Gram(s) IV Push once  dextrose 50% Injectable 25 Gram(s) IV Push once  dextrose 50% Injectable 25 Gram(s) IV Push once  fentaNYL   Patch  25 MICROgram(s)/Hr 1 Patch Transdermal every 72 hours  finasteride 5 milliGRAM(s) Oral daily  insulin lispro (HumaLOG) corrective regimen sliding scale   SubCutaneous three times a day before meals  insulin lispro (HumaLOG) corrective regimen sliding scale   SubCutaneous at bedtime  lamoTRIgine 100 milliGRAM(s) Oral two times a day  montelukast 10 milliGRAM(s) Oral daily  oseltamivir 75 milliGRAM(s) Oral two times a day  tamsulosin 0.4 milliGRAM(s) Oral at bedtime  tiotropium 18 MICROgram(s) Capsule 1 Capsule(s) Inhalation daily  vancomycin    Solution 125 milliGRAM(s) Oral every 6 hours    MEDICATIONS  (PRN):  acetaminophen   Tablet .. 650 milliGRAM(s) Oral every 6 hours PRN Temp greater or equal to 38C (100.4F), Mild Pain (1 - 3)  ALPRAZolam 0.5 milliGRAM(s) Oral every 8 hours PRN anxiety  dextrose 40% Gel 15 Gram(s) Oral once PRN Blood Glucose LESS THAN 70 milliGRAM(s)/deciliter  glucagon  Injectable 1 milliGRAM(s) IntraMuscular once PRN Glucose LESS THAN 70 milligrams/deciliter  haloperidol    Injectable 2 milliGRAM(s) IntraMuscular every 6 hours PRN Agitation  ondansetron Injectable 4 milliGRAM(s) IV Push every 6 hours PRN Nausea    Allergies    penicillin (Unknown)    Intolerances      Social: no smoking, no alcohol, no illegal drugs; no recent travel, no exposure to TB  FAMILY HISTORY:  No pertinent family history in first degree relatives    no history of premature cardiovascular disease in first degree relatives      ROS: the patient denies fever, no chills, no HA, no dizziness, no sore throat, no blurry vision, no CP, no palpitations, has SOB, has cough, no abdominal pain, has diarrhea, no N/V, no dysuria, no leg pain, no claudication, no rash, no joint aches, no rectal pain or bleeding, no night sweats, all other systems reviewed and are negative      Vital Signs Last 24 Hrs  T(C): 36.5 (2019 13:15), Max: 36.8 (2019 20:35)  T(F): 97.7 (2019 13:15), Max: 98.2 (2019 20:35)  HR: 93 (2019 13:15) (93 - 107)  BP: 111/65 (2019 13:15) (111/65 - 132/77)  BP(mean): --  RR: 17 (2019 13:15) (17 - 18)  SpO2: 100% (2019 13:15) (97% - 100%)  Daily     Daily Weight in k.3 (2019 16:15)    PE:    Constitutional: frail looking  HEENT: NC/AT, EOMI, PERRLA, conjunctivae clear; ears and nose atraumatic; pharynx benign  Neck: supple; thyroid not palpable  Back: no tenderness  Respiratory: respiratory effort normal; decreased BS at bases; few crackles  Cardiovascular: S1S2 regular, no murmurs  Abdomen: soft, not tender, not distended, positive BS; no liver or spleen organomegaly  Genitourinary: no suprapubic tenderness  Lymphatic: no LN palpable  Musculoskeletal: no muscle tenderness, no joint swelling or tenderness  Extremities: no pedal edema  Neurological/ Psychiatric: AxOx3, judgement and insight normal; moving all extremities  Skin: no rashes; no palpable lesions    Labs: all available labs reviewed                        8.3    4.47  )-----------( 143      ( 2019 05:51 )             24.8         139  |  107  |  6<L>  ----------------------------<  77  3.6   |  21<L>  |  0.93    Ca    8.3<L>      2019 05:51      Culture Results:   GI PCR Results: NOT detected  *******Please Note:*******  GI panel PCR evaluates for:  Campylobacter, Plesiomonas shigelloides, Salmonella,  Vibrio, Yersinia enterocolitica, Enteroaggregative  Escherichia coli (EAEC), Enteropathogenic E.coli (EPEC),  Enterotoxigenic E. coli (ETEC) lt/st, Shiga-like  toxin-producing E. coli (STEC) stx1/stx2,  Shigella/ Enteroinvasive E. coli (EIEC), Cryptosporidium,  Cyclospora cayetanensis, Entamoeba histolytica,  Giardia lamblia, Adenovirus F 40/41, Astrovirus,  Norovirus GI/GII, Rotavirus A, Sapovirus ( @ 17:00)    Clostridium difficile Toxin by PCR (19 @ 03:00)    C Diff by PCR Result: Detected    Rapid Respiratory Viral Panel (19 @ 14:37)    Rapid RVP Result: Detected    Influenza AH3 (RapRVP): Detected: spoke to nurse janice artis 19 16:37        Radiology: all available radiological tests reviewed    < from: Xray Chest 1 View-PORTABLE IMMEDIATE (19 @ 14:51) >  Stable very small left pleural effusion. No focal consolidation.   Cardiomediastinal silhouette is intact.      < end of copied text >      Advanced directives addressed: full resuscitation

## 2019-04-08 LAB
GLUCOSE BLDC GLUCOMTR-MCNC: 107 MG/DL — HIGH (ref 70–99)
GLUCOSE BLDC GLUCOMTR-MCNC: 112 MG/DL — HIGH (ref 70–99)
GLUCOSE BLDC GLUCOMTR-MCNC: 120 MG/DL — HIGH (ref 70–99)
GLUCOSE BLDC GLUCOMTR-MCNC: 123 MG/DL — HIGH (ref 70–99)

## 2019-04-08 RX ORDER — SODIUM CHLORIDE 9 MG/ML
1000 INJECTION INTRAMUSCULAR; INTRAVENOUS; SUBCUTANEOUS
Qty: 0 | Refills: 0 | Status: DISCONTINUED | OUTPATIENT
Start: 2019-04-08 | End: 2019-04-12

## 2019-04-08 RX ORDER — ZOLPIDEM TARTRATE 10 MG/1
5 TABLET ORAL ONCE
Qty: 0 | Refills: 0 | Status: DISCONTINUED | OUTPATIENT
Start: 2019-04-08 | End: 2019-04-08

## 2019-04-08 RX ADMIN — Medication 650 MILLIGRAM(S): at 13:29

## 2019-04-08 RX ADMIN — SODIUM CHLORIDE 70 MILLILITER(S): 9 INJECTION INTRAMUSCULAR; INTRAVENOUS; SUBCUTANEOUS at 16:32

## 2019-04-08 RX ADMIN — LAMOTRIGINE 100 MILLIGRAM(S): 25 TABLET, ORALLY DISINTEGRATING ORAL at 05:18

## 2019-04-08 RX ADMIN — MONTELUKAST 10 MILLIGRAM(S): 4 TABLET, CHEWABLE ORAL at 22:54

## 2019-04-08 RX ADMIN — Medication 75 MILLIGRAM(S): at 17:13

## 2019-04-08 RX ADMIN — FENTANYL CITRATE 1 PATCH: 50 INJECTION INTRAVENOUS at 07:42

## 2019-04-08 RX ADMIN — Medication 125 MILLIGRAM(S): at 22:56

## 2019-04-08 RX ADMIN — Medication 100 MILLIGRAM(S): at 22:54

## 2019-04-08 RX ADMIN — Medication 100 MILLIGRAM(S): at 12:27

## 2019-04-08 RX ADMIN — Medication 100 MILLIGRAM(S): at 05:20

## 2019-04-08 RX ADMIN — Medication 125 MILLIGRAM(S): at 05:21

## 2019-04-08 RX ADMIN — APIXABAN 5 MILLIGRAM(S): 2.5 TABLET, FILM COATED ORAL at 05:18

## 2019-04-08 RX ADMIN — Medication 125 MILLIGRAM(S): at 00:28

## 2019-04-08 RX ADMIN — TIOTROPIUM BROMIDE 1 CAPSULE(S): 18 CAPSULE ORAL; RESPIRATORY (INHALATION) at 10:25

## 2019-04-08 RX ADMIN — ZOLPIDEM TARTRATE 5 MILLIGRAM(S): 10 TABLET ORAL at 22:53

## 2019-04-08 RX ADMIN — LAMOTRIGINE 100 MILLIGRAM(S): 25 TABLET, ORALLY DISINTEGRATING ORAL at 17:13

## 2019-04-08 RX ADMIN — APIXABAN 5 MILLIGRAM(S): 2.5 TABLET, FILM COATED ORAL at 17:13

## 2019-04-08 RX ADMIN — ATORVASTATIN CALCIUM 40 MILLIGRAM(S): 80 TABLET, FILM COATED ORAL at 22:54

## 2019-04-08 RX ADMIN — TAMSULOSIN HYDROCHLORIDE 0.4 MILLIGRAM(S): 0.4 CAPSULE ORAL at 22:54

## 2019-04-08 RX ADMIN — FINASTERIDE 5 MILLIGRAM(S): 5 TABLET, FILM COATED ORAL at 12:27

## 2019-04-08 RX ADMIN — Medication 125 MILLIGRAM(S): at 17:13

## 2019-04-08 RX ADMIN — Medication 75 MILLIGRAM(S): at 05:36

## 2019-04-08 RX ADMIN — FENTANYL CITRATE 1 PATCH: 50 INJECTION INTRAVENOUS at 18:28

## 2019-04-08 RX ADMIN — Medication 650 MILLIGRAM(S): at 12:27

## 2019-04-08 RX ADMIN — Medication 125 MILLIGRAM(S): at 12:27

## 2019-04-08 NOTE — CHART NOTE - NSCHARTNOTEFT_GEN_A_CORE
Notified by RN , pt requesting a sleep aid, and reports that Ambien helped in the past. Otherwise no reported issues at this time.    Vital Signs Last 24 Hrs  T(C): 36.7 (08 Apr 2019 20:56), Max: 37.8 (08 Apr 2019 11:56)  T(F): 98.1 (08 Apr 2019 20:56), Max: 100.1 (08 Apr 2019 11:56)  HR: 107 (08 Apr 2019 20:56) (94 - 107)  BP: 122/78 (08 Apr 2019 20:56) (109/67 - 123/70)  BP(mean): --  RR: 18 (08 Apr 2019 20:56) (18 - 19)  SpO2: 95% (08 Apr 2019 20:56) (94% - 100%)    A/P-  64yoM hx of metastatic lung cancer mets to brain and bone, S/P whole brain radiation currently on maintenance chemotherapy, COPD H/O PEs on eliquis, pw 1 day of weakness with altered mental status.    #Insomnia   -Ambien 5 mg x1 now   -Continue to monitor overnight

## 2019-04-08 NOTE — CHART NOTE - NSCHARTNOTEFT_GEN_A_CORE
Called by RN about pt sounding congested.    Pt seen and examined. Pt sleeping comfortably and when aroused denied shortness of breath.  Most recent vital signs stable, pt mildly tachycardic to 104.    Vital Signs Last 24 Hrs  T(C): 36.5 (08 Apr 2019 04:22), Max: 36.6 (07 Apr 2019 21:42)  T(F): 97.7 (08 Apr 2019 04:22), Max: 97.8 (07 Apr 2019 21:42)  HR: 104 (08 Apr 2019 04:22) (93 - 104)  BP: 112/62 (08 Apr 2019 04:22) (111/65 - 123/70)  BP(mean): --  RR: 19 (07 Apr 2019 21:42) (17 - 19)  SpO2: 96% (08 Apr 2019 04:22) (96% - 100%)    Physical  General- NAD  CV +S1/S2  Lungs- no rhonci , no wheezing  Extremities- pulses 2+ b/l    A/P  64yoM hx of metastatic lung cancer mets to brain and bone, S/P whole brain radiation currently on maintenance chemotherapy, COPD H/O PEs on eliquis, pw 1 day of weakness with altered mental status.    Acute febrile illness due to Influenza A3  -Continue to monitor - patient appears comfortable  -Continue spiriva and albuterol prn

## 2019-04-09 LAB
ADD ON TEST-SPECIMEN IN LAB: SIGNIFICANT CHANGE UP
ANION GAP SERPL CALC-SCNC: 7 MMOL/L — SIGNIFICANT CHANGE UP (ref 5–17)
ANISOCYTOSIS BLD QL: SLIGHT — SIGNIFICANT CHANGE UP
BASOPHILS # BLD AUTO: 0 K/UL — SIGNIFICANT CHANGE UP (ref 0–0.2)
BASOPHILS NFR BLD AUTO: 0 % — SIGNIFICANT CHANGE UP (ref 0–2)
BUN SERPL-MCNC: 7 MG/DL — SIGNIFICANT CHANGE UP (ref 7–23)
CALCIUM SERPL-MCNC: 8.2 MG/DL — LOW (ref 8.5–10.1)
CHLORIDE SERPL-SCNC: 111 MMOL/L — HIGH (ref 96–108)
CO2 SERPL-SCNC: 22 MMOL/L — SIGNIFICANT CHANGE UP (ref 22–31)
CREAT SERPL-MCNC: 0.92 MG/DL — SIGNIFICANT CHANGE UP (ref 0.5–1.3)
CULTURE RESULTS: SIGNIFICANT CHANGE UP
CULTURE RESULTS: SIGNIFICANT CHANGE UP
DACRYOCYTES BLD QL SMEAR: SLIGHT — SIGNIFICANT CHANGE UP
ELLIPTOCYTES BLD QL SMEAR: SLIGHT — SIGNIFICANT CHANGE UP
EOSINOPHIL # BLD AUTO: 0 K/UL — SIGNIFICANT CHANGE UP (ref 0–0.5)
EOSINOPHIL NFR BLD AUTO: 0 % — SIGNIFICANT CHANGE UP (ref 0–6)
GLUCOSE BLDC GLUCOMTR-MCNC: 103 MG/DL — HIGH (ref 70–99)
GLUCOSE BLDC GLUCOMTR-MCNC: 129 MG/DL — HIGH (ref 70–99)
GLUCOSE BLDC GLUCOMTR-MCNC: 168 MG/DL — HIGH (ref 70–99)
GLUCOSE SERPL-MCNC: 110 MG/DL — HIGH (ref 70–99)
HCT VFR BLD CALC: 21.1 % — LOW (ref 39–50)
HGB BLD-MCNC: 7.3 G/DL — LOW (ref 13–17)
LYMPHOCYTES # BLD AUTO: 0.58 K/UL — LOW (ref 1–3.3)
LYMPHOCYTES # BLD AUTO: 55 % — HIGH (ref 13–44)
LYMPHOCYTES # SPEC AUTO: 2 % — HIGH (ref 0–0)
MAGNESIUM SERPL-MCNC: 1.3 MG/DL — LOW (ref 1.6–2.6)
MANUAL SMEAR VERIFICATION: SIGNIFICANT CHANGE UP
MCHC RBC-ENTMCNC: 34.1 PG — HIGH (ref 27–34)
MCHC RBC-ENTMCNC: 34.6 GM/DL — SIGNIFICANT CHANGE UP (ref 32–36)
MCV RBC AUTO: 98.6 FL — SIGNIFICANT CHANGE UP (ref 80–100)
MONOCYTES # BLD AUTO: 0.14 K/UL — SIGNIFICANT CHANGE UP (ref 0–0.9)
MONOCYTES NFR BLD AUTO: 13 % — SIGNIFICANT CHANGE UP (ref 2–14)
NEUTROPHILS # BLD AUTO: 0.29 K/UL — LOW (ref 1.8–7.4)
NEUTROPHILS NFR BLD AUTO: 27 % — LOW (ref 43–77)
NRBC # BLD: 0 /100 WBCS — SIGNIFICANT CHANGE UP (ref 0–0)
NRBC # BLD: 0 /100 — SIGNIFICANT CHANGE UP (ref 0–0)
PLAT MORPH BLD: NORMAL — SIGNIFICANT CHANGE UP
PLATELET # BLD AUTO: 68 K/UL — LOW (ref 150–400)
PLATELET COUNT - ESTIMATE: ABNORMAL
POIKILOCYTOSIS BLD QL AUTO: SLIGHT — SIGNIFICANT CHANGE UP
POTASSIUM SERPL-MCNC: 3.1 MMOL/L — LOW (ref 3.5–5.3)
POTASSIUM SERPL-SCNC: 3.1 MMOL/L — LOW (ref 3.5–5.3)
RBC # BLD: 2.14 M/UL — LOW (ref 4.2–5.8)
RBC # FLD: 15.7 % — HIGH (ref 10.3–14.5)
RBC BLD AUTO: ABNORMAL
SODIUM SERPL-SCNC: 140 MMOL/L — SIGNIFICANT CHANGE UP (ref 135–145)
SPECIMEN SOURCE: SIGNIFICANT CHANGE UP
SPECIMEN SOURCE: SIGNIFICANT CHANGE UP
VARIANT LYMPHS # BLD: 3 % — SIGNIFICANT CHANGE UP (ref 0–6)
WBC # BLD: 1.06 K/UL — CRITICAL LOW (ref 3.8–10.5)
WBC # FLD AUTO: 1.06 K/UL — CRITICAL LOW (ref 3.8–10.5)

## 2019-04-09 RX ORDER — DIPHENHYDRAMINE HCL 50 MG
25 CAPSULE ORAL EVERY 6 HOURS
Qty: 0 | Refills: 0 | Status: DISCONTINUED | OUTPATIENT
Start: 2019-04-09 | End: 2019-04-15

## 2019-04-09 RX ORDER — ZOLPIDEM TARTRATE 10 MG/1
5 TABLET ORAL ONCE
Qty: 0 | Refills: 0 | Status: DISCONTINUED | OUTPATIENT
Start: 2019-04-09 | End: 2019-04-09

## 2019-04-09 RX ORDER — IPRATROPIUM/ALBUTEROL SULFATE 18-103MCG
3 AEROSOL WITH ADAPTER (GRAM) INHALATION EVERY 6 HOURS
Qty: 0 | Refills: 0 | Status: COMPLETED | OUTPATIENT
Start: 2019-04-09 | End: 2019-04-11

## 2019-04-09 RX ORDER — POTASSIUM CHLORIDE 20 MEQ
20 PACKET (EA) ORAL ONCE
Qty: 0 | Refills: 0 | Status: COMPLETED | OUTPATIENT
Start: 2019-04-09 | End: 2019-04-09

## 2019-04-09 RX ORDER — MAGNESIUM SULFATE 500 MG/ML
2 VIAL (ML) INJECTION ONCE
Qty: 0 | Refills: 0 | Status: COMPLETED | OUTPATIENT
Start: 2019-04-09 | End: 2019-04-09

## 2019-04-09 RX ADMIN — SODIUM CHLORIDE 70 MILLILITER(S): 9 INJECTION INTRAMUSCULAR; INTRAVENOUS; SUBCUTANEOUS at 05:54

## 2019-04-09 RX ADMIN — APIXABAN 5 MILLIGRAM(S): 2.5 TABLET, FILM COATED ORAL at 17:26

## 2019-04-09 RX ADMIN — Medication 3 MILLILITER(S): at 20:01

## 2019-04-09 RX ADMIN — Medication 125 MILLIGRAM(S): at 21:08

## 2019-04-09 RX ADMIN — Medication 125 MILLIGRAM(S): at 05:56

## 2019-04-09 RX ADMIN — ATORVASTATIN CALCIUM 40 MILLIGRAM(S): 80 TABLET, FILM COATED ORAL at 21:05

## 2019-04-09 RX ADMIN — Medication 25 MILLIGRAM(S): at 21:07

## 2019-04-09 RX ADMIN — FENTANYL CITRATE 1 PATCH: 50 INJECTION INTRAVENOUS at 06:03

## 2019-04-09 RX ADMIN — TIOTROPIUM BROMIDE 1 CAPSULE(S): 18 CAPSULE ORAL; RESPIRATORY (INHALATION) at 09:09

## 2019-04-09 RX ADMIN — SODIUM CHLORIDE 70 MILLILITER(S): 9 INJECTION INTRAMUSCULAR; INTRAVENOUS; SUBCUTANEOUS at 21:14

## 2019-04-09 RX ADMIN — Medication 100 MILLIGRAM(S): at 21:05

## 2019-04-09 RX ADMIN — Medication 100 MILLIGRAM(S): at 05:56

## 2019-04-09 RX ADMIN — LAMOTRIGINE 100 MILLIGRAM(S): 25 TABLET, ORALLY DISINTEGRATING ORAL at 05:56

## 2019-04-09 RX ADMIN — Medication 125 MILLIGRAM(S): at 13:44

## 2019-04-09 RX ADMIN — Medication 75 MILLIGRAM(S): at 17:26

## 2019-04-09 RX ADMIN — Medication 125 MILLIGRAM(S): at 17:26

## 2019-04-09 RX ADMIN — ZOLPIDEM TARTRATE 5 MILLIGRAM(S): 10 TABLET ORAL at 21:07

## 2019-04-09 RX ADMIN — Medication 100 MILLIGRAM(S): at 13:44

## 2019-04-09 RX ADMIN — Medication 50 GRAM(S): at 17:27

## 2019-04-09 RX ADMIN — APIXABAN 5 MILLIGRAM(S): 2.5 TABLET, FILM COATED ORAL at 05:55

## 2019-04-09 RX ADMIN — FINASTERIDE 5 MILLIGRAM(S): 5 TABLET, FILM COATED ORAL at 13:44

## 2019-04-09 RX ADMIN — MONTELUKAST 10 MILLIGRAM(S): 4 TABLET, CHEWABLE ORAL at 21:05

## 2019-04-09 RX ADMIN — TAMSULOSIN HYDROCHLORIDE 0.4 MILLIGRAM(S): 0.4 CAPSULE ORAL at 21:05

## 2019-04-09 RX ADMIN — Medication 1200 MILLIGRAM(S): at 17:26

## 2019-04-09 RX ADMIN — FENTANYL CITRATE 1 PATCH: 50 INJECTION INTRAVENOUS at 17:43

## 2019-04-09 RX ADMIN — Medication 3 MILLILITER(S): at 18:13

## 2019-04-09 RX ADMIN — LAMOTRIGINE 100 MILLIGRAM(S): 25 TABLET, ORALLY DISINTEGRATING ORAL at 17:26

## 2019-04-09 RX ADMIN — Medication 75 MILLIGRAM(S): at 05:56

## 2019-04-09 RX ADMIN — Medication 20 MILLIEQUIVALENT(S): at 21:06

## 2019-04-09 NOTE — PROGRESS NOTE ADULT - ASSESSMENT
65 y/o Male with h/o metastatic lung cancer with mets to brain and bone s/p whole brain radiation, on maintenance chemotherapy, prior PE on eliquis, was admitted on 4/4 for increased weakness and altered mental status. The patient was more confused, not answering questions appropriately. Weakness has been progressive worse, associated with decreased food and water intake. In ER, he was found with fever to 102F. He received IV vancomycin and azactam, then oseltamivir.    1. Influenza A. Left pleural effusion. Diarrheal syndrome. CDAD. Lung Ca with brain mets. Immunocompromised host.  -on contact and droplet isolation  -no clinical signs of toxic megacolon  -agree with oseltamivir 75 mg PO q12h and vancomycin 125 mg PO q6h  - rash most likely secondary to contact dermatitis with bedding  - consideration for palliative care for lung cancer  -monitor temps  -f/u CBC  -supportive care  2. Other issues:   -care per medicine

## 2019-04-09 NOTE — PROGRESS NOTE ADULT - ASSESSMENT
64yoM hx of metastatic lung cancer mets to brain and bone, S/P whole brain radiation currently on maintenance chemotherapy, H/O PEs on eliquis, pw 1 day of weakness with altered mental status. Per wife, patient seemed confused, not answering questions appropriately today. Weakness has been progressive worse today, associated with decreased food and water intake. + sick contacts at home with the flu. He was recently admitted with pneumonia. He denies any nausea, vomiting, abd pain, diarrhea or chest pain. He was found to have fever of 102F in ED. He received IV vancomycin and azactam in ED.     1. Acute febrile illness ue to Influenza A3  Immunocompromised  patient  Follow blood cultures: no growth  Continue tamiflu  day#5  Dr Franks consult appreciated.   start nebs and mucinex    2. Dehydration  Poor oral intake  Diarrhea  IV fluid    3. H/O Metastatic lung cancer.  Outpatient follow up  last chemo was last Monday.    4. H/o PE  Continue eliquis.    5. DM-2  Stable  On ISS.    6. H/O COPD  Continue albuterol  and spiriva.    7. Urinary retention-  Beckwith removed yesterday, voiding  Continue flomax and start proscar    8. C diff colitis  Continue po vanco  still having liquid stool    9. rash possibly contact dermatitis  - benadryl prn    10. neutropenia: neutropenic precaution  [facility] eval for neulasta    Disposition-possibly d/c home tomorrow if stable

## 2019-04-10 LAB
ADD ON TEST-SPECIMEN IN LAB: SIGNIFICANT CHANGE UP
ANION GAP SERPL CALC-SCNC: 9 MMOL/L — SIGNIFICANT CHANGE UP (ref 5–17)
BASOPHILS # BLD AUTO: 0 K/UL — SIGNIFICANT CHANGE UP (ref 0–0.2)
BASOPHILS NFR BLD AUTO: 0 % — SIGNIFICANT CHANGE UP (ref 0–2)
BLD GP AB SCN SERPL QL: SIGNIFICANT CHANGE UP
BUN SERPL-MCNC: 4 MG/DL — LOW (ref 7–23)
CALCIUM SERPL-MCNC: 8.1 MG/DL — LOW (ref 8.5–10.1)
CHLORIDE SERPL-SCNC: 111 MMOL/L — HIGH (ref 96–108)
CO2 SERPL-SCNC: 18 MMOL/L — LOW (ref 22–31)
CREAT SERPL-MCNC: 0.84 MG/DL — SIGNIFICANT CHANGE UP (ref 0.5–1.3)
EOSINOPHIL # BLD AUTO: 0.06 K/UL — SIGNIFICANT CHANGE UP (ref 0–0.5)
EOSINOPHIL NFR BLD AUTO: 3.7 % — SIGNIFICANT CHANGE UP (ref 0–6)
GLUCOSE BLDC GLUCOMTR-MCNC: 104 MG/DL — HIGH (ref 70–99)
GLUCOSE BLDC GLUCOMTR-MCNC: 118 MG/DL — HIGH (ref 70–99)
GLUCOSE BLDC GLUCOMTR-MCNC: 122 MG/DL — HIGH (ref 70–99)
GLUCOSE BLDC GLUCOMTR-MCNC: 150 MG/DL — HIGH (ref 70–99)
GLUCOSE SERPL-MCNC: 115 MG/DL — HIGH (ref 70–99)
HCT VFR BLD CALC: 20.7 % — CRITICAL LOW (ref 39–50)
HCT VFR BLD CALC: 24.2 % — LOW (ref 39–50)
HGB BLD-MCNC: 6.8 G/DL — CRITICAL LOW (ref 13–17)
HGB BLD-MCNC: 8.4 G/DL — LOW (ref 13–17)
IMM GRANULOCYTES NFR BLD AUTO: 1.2 % — SIGNIFICANT CHANGE UP (ref 0–1.5)
LYMPHOCYTES # BLD AUTO: 0.78 K/UL — LOW (ref 1–3.3)
LYMPHOCYTES # BLD AUTO: 47.6 % — HIGH (ref 13–44)
MAGNESIUM SERPL-MCNC: 1.6 MG/DL — SIGNIFICANT CHANGE UP (ref 1.6–2.6)
MCHC RBC-ENTMCNC: 32.4 PG — SIGNIFICANT CHANGE UP (ref 27–34)
MCHC RBC-ENTMCNC: 32.9 GM/DL — SIGNIFICANT CHANGE UP (ref 32–36)
MCHC RBC-ENTMCNC: 33.3 PG — SIGNIFICANT CHANGE UP (ref 27–34)
MCHC RBC-ENTMCNC: 34.7 GM/DL — SIGNIFICANT CHANGE UP (ref 32–36)
MCV RBC AUTO: 101.5 FL — HIGH (ref 80–100)
MCV RBC AUTO: 93.4 FL — SIGNIFICANT CHANGE UP (ref 80–100)
MONOCYTES # BLD AUTO: 0.29 K/UL — SIGNIFICANT CHANGE UP (ref 0–0.9)
MONOCYTES NFR BLD AUTO: 17.7 % — HIGH (ref 2–14)
NEUTROPHILS # BLD AUTO: 0.49 K/UL — LOW (ref 1.8–7.4)
NEUTROPHILS NFR BLD AUTO: 29.8 % — LOW (ref 43–77)
NRBC # BLD: 0 /100 WBCS — SIGNIFICANT CHANGE UP (ref 0–0)
NRBC # BLD: 0 /100 WBCS — SIGNIFICANT CHANGE UP (ref 0–0)
PLATELET # BLD AUTO: 34 K/UL — LOW (ref 150–400)
PLATELET # BLD AUTO: 43 K/UL — LOW (ref 150–400)
POTASSIUM SERPL-MCNC: 2.9 MMOL/L — CRITICAL LOW (ref 3.5–5.3)
POTASSIUM SERPL-MCNC: 3.5 MMOL/L — SIGNIFICANT CHANGE UP (ref 3.5–5.3)
POTASSIUM SERPL-SCNC: 2.9 MMOL/L — CRITICAL LOW (ref 3.5–5.3)
POTASSIUM SERPL-SCNC: 3.5 MMOL/L — SIGNIFICANT CHANGE UP (ref 3.5–5.3)
RBC # BLD: 2.04 M/UL — LOW (ref 4.2–5.8)
RBC # BLD: 2.59 M/UL — LOW (ref 4.2–5.8)
RBC # FLD: 15.5 % — HIGH (ref 10.3–14.5)
RBC # FLD: 16.1 % — HIGH (ref 10.3–14.5)
SODIUM SERPL-SCNC: 138 MMOL/L — SIGNIFICANT CHANGE UP (ref 135–145)
TYPE + AB SCN PNL BLD: SIGNIFICANT CHANGE UP
WBC # BLD: 1.64 K/UL — LOW (ref 3.8–10.5)
WBC # BLD: 2.69 K/UL — LOW (ref 3.8–10.5)
WBC # FLD AUTO: 1.64 K/UL — LOW (ref 3.8–10.5)
WBC # FLD AUTO: 2.69 K/UL — LOW (ref 3.8–10.5)

## 2019-04-10 RX ORDER — FILGRASTIM 480MCG/1.6
300 VIAL (ML) INJECTION DAILY
Qty: 0 | Refills: 0 | Status: DISCONTINUED | OUTPATIENT
Start: 2019-04-10 | End: 2019-04-12

## 2019-04-10 RX ORDER — POTASSIUM CHLORIDE 20 MEQ
20 PACKET (EA) ORAL
Qty: 0 | Refills: 0 | Status: DISCONTINUED | OUTPATIENT
Start: 2019-04-10 | End: 2019-04-10

## 2019-04-10 RX ORDER — SODIUM CHLORIDE 0.65 %
1 AEROSOL, SPRAY (ML) NASAL EVERY 8 HOURS
Qty: 0 | Refills: 0 | Status: COMPLETED | OUTPATIENT
Start: 2019-04-10 | End: 2019-04-14

## 2019-04-10 RX ORDER — ZOLPIDEM TARTRATE 10 MG/1
5 TABLET ORAL ONCE
Qty: 0 | Refills: 0 | Status: DISCONTINUED | OUTPATIENT
Start: 2019-04-10 | End: 2019-04-10

## 2019-04-10 RX ORDER — POTASSIUM CHLORIDE 20 MEQ
20 PACKET (EA) ORAL ONCE
Qty: 0 | Refills: 0 | Status: COMPLETED | OUTPATIENT
Start: 2019-04-10 | End: 2019-04-10

## 2019-04-10 RX ORDER — POTASSIUM CHLORIDE 20 MEQ
40 PACKET (EA) ORAL EVERY 4 HOURS
Qty: 0 | Refills: 0 | Status: COMPLETED | OUTPATIENT
Start: 2019-04-10 | End: 2019-04-10

## 2019-04-10 RX ORDER — MAGNESIUM SULFATE 500 MG/ML
1 VIAL (ML) INJECTION ONCE
Qty: 0 | Refills: 0 | Status: COMPLETED | OUTPATIENT
Start: 2019-04-10 | End: 2019-04-10

## 2019-04-10 RX ADMIN — APIXABAN 5 MILLIGRAM(S): 2.5 TABLET, FILM COATED ORAL at 05:39

## 2019-04-10 RX ADMIN — Medication 100 MILLIGRAM(S): at 22:29

## 2019-04-10 RX ADMIN — Medication 25 MILLIGRAM(S): at 22:28

## 2019-04-10 RX ADMIN — Medication 125 MILLIGRAM(S): at 17:41

## 2019-04-10 RX ADMIN — ONDANSETRON 4 MILLIGRAM(S): 8 TABLET, FILM COATED ORAL at 22:39

## 2019-04-10 RX ADMIN — Medication 100 MILLIGRAM(S): at 15:53

## 2019-04-10 RX ADMIN — FENTANYL CITRATE 1 PATCH: 50 INJECTION INTRAVENOUS at 17:50

## 2019-04-10 RX ADMIN — APIXABAN 5 MILLIGRAM(S): 2.5 TABLET, FILM COATED ORAL at 17:41

## 2019-04-10 RX ADMIN — Medication 3 MILLILITER(S): at 13:45

## 2019-04-10 RX ADMIN — Medication 125 MILLIGRAM(S): at 05:39

## 2019-04-10 RX ADMIN — FINASTERIDE 5 MILLIGRAM(S): 5 TABLET, FILM COATED ORAL at 11:41

## 2019-04-10 RX ADMIN — LAMOTRIGINE 100 MILLIGRAM(S): 25 TABLET, ORALLY DISINTEGRATING ORAL at 05:39

## 2019-04-10 RX ADMIN — Medication 100 GRAM(S): at 20:21

## 2019-04-10 RX ADMIN — FENTANYL CITRATE 1 PATCH: 50 INJECTION INTRAVENOUS at 17:41

## 2019-04-10 RX ADMIN — Medication 1 SPRAY(S): at 15:53

## 2019-04-10 RX ADMIN — Medication 125 MILLIGRAM(S): at 11:40

## 2019-04-10 RX ADMIN — ZOLPIDEM TARTRATE 5 MILLIGRAM(S): 10 TABLET ORAL at 23:46

## 2019-04-10 RX ADMIN — TAMSULOSIN HYDROCHLORIDE 0.4 MILLIGRAM(S): 0.4 CAPSULE ORAL at 22:29

## 2019-04-10 RX ADMIN — Medication 1200 MILLIGRAM(S): at 17:41

## 2019-04-10 RX ADMIN — FENTANYL CITRATE 1 PATCH: 50 INJECTION INTRAVENOUS at 11:43

## 2019-04-10 RX ADMIN — MONTELUKAST 10 MILLIGRAM(S): 4 TABLET, CHEWABLE ORAL at 22:29

## 2019-04-10 RX ADMIN — Medication 3 MILLILITER(S): at 20:03

## 2019-04-10 RX ADMIN — LAMOTRIGINE 100 MILLIGRAM(S): 25 TABLET, ORALLY DISINTEGRATING ORAL at 17:41

## 2019-04-10 RX ADMIN — Medication 125 MILLIGRAM(S): at 22:29

## 2019-04-10 RX ADMIN — Medication 100 MILLIGRAM(S): at 05:39

## 2019-04-10 RX ADMIN — Medication 75 MILLIGRAM(S): at 05:39

## 2019-04-10 RX ADMIN — Medication 40 MILLIEQUIVALENT(S): at 15:53

## 2019-04-10 RX ADMIN — Medication 300 MICROGRAM(S): at 11:41

## 2019-04-10 RX ADMIN — Medication 1200 MILLIGRAM(S): at 05:39

## 2019-04-10 RX ADMIN — Medication 40 MILLIEQUIVALENT(S): at 11:40

## 2019-04-10 RX ADMIN — Medication 1 SPRAY(S): at 22:33

## 2019-04-10 RX ADMIN — ATORVASTATIN CALCIUM 40 MILLIGRAM(S): 80 TABLET, FILM COATED ORAL at 22:29

## 2019-04-10 NOTE — CHART NOTE - NSCHARTNOTEFT_GEN_A_CORE
Nutrition Follow Up Note  Patient seen for: Malnutrition Follow-Up    Pt is a 65 y/o M admitted with sepsis, AMS, Influenza A3. PMH Lung Ca w/ mets to brain and bone (on chemo - last tx 19), T2DM (HgbA1c 6.2%). Pt continues to be febrile due to influenza, dehydration secondary to poor oral intake/diarrhea. Last BM , multiple episodes of diarrhea noted. No other signs of GI distress noted at this time.     Source: Pt, RN, EMR    Diet : Regular + Ensure Enlive two times per day + GelateinPlus jello three times per day     Patient reports: Pt agitated upon visit. Reports low PO intake and reduced desire to eat as he wants to go home. Observation of pt's breakfast tray revealed pt did not consume nutritional supplements (Ensure Enlive or Gelatein jello). Encouraged pt to consume meals/supplements as tolerated for recovery. Offered pt alternative options/flavors for supplements, pt declined at this time.     PO intake : Variable intake per EMR (0-50%)    Daily Weight in k.3 (-06)  No recent weight change noted.    Pertinent Medications: MEDICATIONS  (STANDING):  ALBUTerol/ipratropium for Nebulization 3 milliLiter(s) Nebulizer every 6 hours  apixaban 5 milliGRAM(s) Oral every 12 hours  atorvastatin 40 milliGRAM(s) Oral at bedtime  benzonatate 100 milliGRAM(s) Oral three times a day  dextrose 5%. 1000 milliLiter(s) (50 mL/Hr) IV Continuous <Continuous>  dextrose 50% Injectable 12.5 Gram(s) IV Push once  dextrose 50% Injectable 25 Gram(s) IV Push once  dextrose 50% Injectable 25 Gram(s) IV Push once  fentaNYL   Patch  25 MICROgram(s)/Hr 1 Patch Transdermal every 72 hours  finasteride 5 milliGRAM(s) Oral daily  guaiFENesin ER 1200 milliGRAM(s) Oral every 12 hours  insulin lispro (HumaLOG) corrective regimen sliding scale   SubCutaneous three times a day before meals  insulin lispro (HumaLOG) corrective regimen sliding scale   SubCutaneous at bedtime  lamoTRIgine 100 milliGRAM(s) Oral two times a day  montelukast 10 milliGRAM(s) Oral daily  potassium chloride   Powder 20 milliEquivalent(s) Oral every 2 hours  sodium chloride 0.9%. 1000 milliLiter(s) (70 mL/Hr) IV Continuous <Continuous>  tamsulosin 0.4 milliGRAM(s) Oral at bedtime  tiotropium 18 MICROgram(s) Capsule 1 Capsule(s) Inhalation daily  vancomycin    Solution 125 milliGRAM(s) Oral every 6 hours    MEDICATIONS  (PRN):  acetaminophen   Tablet .. 650 milliGRAM(s) Oral every 6 hours PRN Temp greater or equal to 38C (100.4F), Mild Pain (1 - 3)  ALPRAZolam 0.5 milliGRAM(s) Oral every 8 hours PRN anxiety  dextrose 40% Gel 15 Gram(s) Oral once PRN Blood Glucose LESS THAN 70 milliGRAM(s)/deciliter  diphenhydrAMINE 25 milliGRAM(s) Oral every 6 hours PRN Rash and/or Itching  glucagon  Injectable 1 milliGRAM(s) IntraMuscular once PRN Glucose LESS THAN 70 milligrams/deciliter  haloperidol    Injectable 2 milliGRAM(s) IntraMuscular every 6 hours PRN Agitation  ondansetron Injectable 4 milliGRAM(s) IV Push every 6 hours PRN Nausea    Pertinent Labs: 04-10 @ 05:53: Na 138, BUN 4<L>, Cr 0.84, <H>, K+ 2.9<LL>    Finger Sticks:  POCT Blood Glucose.: 122 mg/dL (04-10 @ 08:44)  POCT Blood Glucose.: 168 mg/dL ( @ 21:43)  POCT Blood Glucose.: 129 mg/dL ( @ 17:22)  POCT Blood Glucose.: 103 mg/dL ( @ 13:30)      Skin per nursing documentation: No pressure ulcers  Edema: Periorbital (chronic)    Estimated Needs:   [ x ] no change since previous assessment  [ ] recalculated:     Previous Nutrition Diagnosis: Severe malnutrition  Nutrition Diagnosis is: Ongoing, being addressed with liberalized diet + nutritional supplementation     Interventions:     Recommend  1) Continue regular diet. Encourage PO intake as able.  2) Continue  Ensure Enlive BID and Gelatein Plus jello TID     Monitoring and Evaluation:     Continue to monitor Nutritional intake, Tolerance to diet prescription, weights, labs, skin integrity    RD remains available upon request and will follow up per protocol

## 2019-04-10 NOTE — CONSULT NOTE ADULT - SUBJECTIVE AND OBJECTIVE BOX
HPI:  64yoM hx of metastatic lung cancer mets to brain and bone, S/P whole brain radiation currently on maintenance chemotherapy, H/O PEs on eliquis, pw 1 day of weakness with altered mental status. Per wife, patient seemed confused, not answering questions appropriately today. Weakness has been progressive worse today, associated with decreased food and water intake. + sick contacts at home with the flu. He was recently admitted with pneumonia. He denies any nausea, vomiting, abd pain, diarrhea or chest pain. He was found to have fever of 102F in ED. He received IV vancomycin and azactam in ED. (04 Apr 2019 15:56)    patient with Pancytopenia    PAST MEDICAL & SURGICAL HISTORY:  Lung cancer  COPD (chronic obstructive pulmonary disease)  Nephrotic syndrome  History of cholecystectomy  H/O spinal fusion      MEDICATIONS  (STANDING):  ALBUTerol/ipratropium for Nebulization 3 milliLiter(s) Nebulizer every 6 hours  apixaban 5 milliGRAM(s) Oral every 12 hours  atorvastatin 40 milliGRAM(s) Oral at bedtime  benzonatate 100 milliGRAM(s) Oral three times a day  dextrose 5%. 1000 milliLiter(s) (50 mL/Hr) IV Continuous <Continuous>  dextrose 50% Injectable 12.5 Gram(s) IV Push once  dextrose 50% Injectable 25 Gram(s) IV Push once  dextrose 50% Injectable 25 Gram(s) IV Push once  fentaNYL   Patch  25 MICROgram(s)/Hr 1 Patch Transdermal every 72 hours  finasteride 5 milliGRAM(s) Oral daily  guaiFENesin ER 1200 milliGRAM(s) Oral every 12 hours  insulin lispro (HumaLOG) corrective regimen sliding scale   SubCutaneous three times a day before meals  insulin lispro (HumaLOG) corrective regimen sliding scale   SubCutaneous at bedtime  lamoTRIgine 100 milliGRAM(s) Oral two times a day  montelukast 10 milliGRAM(s) Oral daily  potassium chloride    Tablet ER 40 milliEquivalent(s) Oral every 4 hours  sodium chloride 0.9%. 1000 milliLiter(s) (70 mL/Hr) IV Continuous <Continuous>  tamsulosin 0.4 milliGRAM(s) Oral at bedtime  tiotropium 18 MICROgram(s) Capsule 1 Capsule(s) Inhalation daily  vancomycin    Solution 125 milliGRAM(s) Oral every 6 hours    MEDICATIONS  (PRN):  acetaminophen   Tablet .. 650 milliGRAM(s) Oral every 6 hours PRN Temp greater or equal to 38C (100.4F), Mild Pain (1 - 3)  ALPRAZolam 0.5 milliGRAM(s) Oral every 8 hours PRN anxiety  dextrose 40% Gel 15 Gram(s) Oral once PRN Blood Glucose LESS THAN 70 milliGRAM(s)/deciliter  diphenhydrAMINE 25 milliGRAM(s) Oral every 6 hours PRN Rash and/or Itching  glucagon  Injectable 1 milliGRAM(s) IntraMuscular once PRN Glucose LESS THAN 70 milligrams/deciliter  haloperidol    Injectable 2 milliGRAM(s) IntraMuscular every 6 hours PRN Agitation  ondansetron Injectable 4 milliGRAM(s) IV Push every 6 hours PRN Nausea      Allergies    penicillin (Unknown)    Intolerances        SOCIAL HISTORY:    FAMILY HISTORY:  No pertinent family history in first degree relatives      Vital Signs Last 24 Hrs  T(C): 36.5 (10 Apr 2019 07:22), Max: 36.7 (10 Apr 2019 00:25)  T(F): 97.7 (10 Apr 2019 07:22), Max: 98 (10 Apr 2019 00:25)  HR: 109 (10 Apr 2019 07:22) (95 - 109)  BP: 136/81 (10 Apr 2019 07:22) (124/76 - 142/81)  BP(mean): --  RR: 18 (10 Apr 2019 07:22) (18 - 20)  SpO2: 93% (10 Apr 2019 07:22) (92% - 97%)      LABS:                        6.8    1.64  )-----------( 43       ( 10 Apr 2019 05:53 )             20.7     04-10    138  |  111<H>  |  4<L>  ----------------------------<  115<H>  2.9<LL>   |  18<L>  |  0.84    Ca    8.1<L>      10 Apr 2019 05:53  Mg     1.3     04-09            RADIOLOGY & ADDITIONAL STUDIES: HPI:  64yoM hx of metastatic lung cancer mets to brain and bone, S/P whole brain radiation currently on maintenance chemotherapy, Cared for at [facility] [place]. He has been admitted here with weakness, fatigue, progressive failure to thrive, altered mental status. He recently received  chemotherapy, details unclear. Blood counts were performed and demonstrates a pancytopenia. We have been  consulted  to  decide whether to start him on growth factors, transfusional support etc.       patient with Pancytopenia    PAST MEDICAL & SURGICAL HISTORY:  Lung cancer  COPD (chronic obstructive pulmonary disease)  Nephrotic syndrome  History of cholecystectomy  H/O spinal fusion      MEDICATIONS  (STANDING):  ALBUTerol/ipratropium for Nebulization 3 milliLiter(s) Nebulizer every 6 hours  apixaban 5 milliGRAM(s) Oral every 12 hours  atorvastatin 40 milliGRAM(s) Oral at bedtime  benzonatate 100 milliGRAM(s) Oral three times a day  dextrose 5%. 1000 milliLiter(s) (50 mL/Hr) IV Continuous <Continuous>  dextrose 50% Injectable 12.5 Gram(s) IV Push once  dextrose 50% Injectable 25 Gram(s) IV Push once  dextrose 50% Injectable 25 Gram(s) IV Push once  fentaNYL   Patch  25 MICROgram(s)/Hr 1 Patch Transdermal every 72 hours  finasteride 5 milliGRAM(s) Oral daily  guaiFENesin ER 1200 milliGRAM(s) Oral every 12 hours  insulin lispro (HumaLOG) corrective regimen sliding scale   SubCutaneous three times a day before meals  insulin lispro (HumaLOG) corrective regimen sliding scale   SubCutaneous at bedtime  lamoTRIgine 100 milliGRAM(s) Oral two times a day  montelukast 10 milliGRAM(s) Oral daily  potassium chloride    Tablet ER 40 milliEquivalent(s) Oral every 4 hours  sodium chloride 0.9%. 1000 milliLiter(s) (70 mL/Hr) IV Continuous <Continuous>  tamsulosin 0.4 milliGRAM(s) Oral at bedtime  tiotropium 18 MICROgram(s) Capsule 1 Capsule(s) Inhalation daily  vancomycin    Solution 125 milliGRAM(s) Oral every 6 hours    MEDICATIONS  (PRN):  acetaminophen   Tablet .. 650 milliGRAM(s) Oral every 6 hours PRN Temp greater or equal to 38C (100.4F), Mild Pain (1 - 3)  ALPRAZolam 0.5 milliGRAM(s) Oral every 8 hours PRN anxiety  dextrose 40% Gel 15 Gram(s) Oral once PRN Blood Glucose LESS THAN 70 milliGRAM(s)/deciliter  diphenhydrAMINE 25 milliGRAM(s) Oral every 6 hours PRN Rash and/or Itching  glucagon  Injectable 1 milliGRAM(s) IntraMuscular once PRN Glucose LESS THAN 70 milligrams/deciliter  haloperidol    Injectable 2 milliGRAM(s) IntraMuscular every 6 hours PRN Agitation  ondansetron Injectable 4 milliGRAM(s) IV Push every 6 hours PRN Nausea      Allergies    penicillin (Unknown)    Intolerances        SOCIAL HISTORY:    FAMILY HISTORY:  No pertinent family history in first degree relatives      Vital Signs Last 24 Hrs/ appears chronically ill, Cushingoid, alert today  T(C): 36.5 (10 Apr 2019 07:22), Max: 36.7 (10 Apr 2019 00:25)  T(F): 97.7 (10 Apr 2019 07:22), Max: 98 (10 Apr 2019 00:25)  HR: 109 (10 Apr 2019 07:22) (95 - 109)  BP: 136/81 (10 Apr 2019 07:22) (124/76 - 142/81)  BP(mean): --  RR: 18 (10 Apr 2019 07:22) (18 - 20)  SpO2: 93% (10 Apr 2019 07:22) (92% - 97%)  ECOG 2-3  NAD  Neck neg  Lungs clear  Cor neg  Abd soft        LABS:                        6.8    1.64  )-----------( 43       ( 10 Apr 2019 05:53 )             20.7     04-10    138  |  111<H>  |  4<L>  ----------------------------<  115<H>  2.9<LL>   |  18<L>  |  0.84    Ca    8.1<L>      10 Apr 2019 05:53  Mg     1.3     04-09            RADIOLOGY & ADDITIONAL STUDIES:

## 2019-04-10 NOTE — PROGRESS NOTE ADULT - ASSESSMENT
65 y/o Male with h/o metastatic lung cancer with mets to brain and bone s/p whole brain radiation, on maintenance chemotherapy, prior PE on eliquis, was admitted on 4/4 for increased weakness and altered mental status. The patient was more confused, not answering questions appropriately. Weakness has been progressive worse, associated with decreased food and water intake. In ER, he was found with fever to 102F. He received IV vancomycin and azactam, then oseltamivir.    1. Influenza A. Left pleural effusion. Diarrheal syndrome. CDAD. Lung Ca with brain mets. Immunocompromised host.  -on contact and droplet isolation  -no clinical signs of toxic megacolon  -completed oseltamivir  - continue po vancomycin until 4/20  - rash most likely secondary to contact dermatitis with bedding  - consideration for palliative care for lung cancer  -monitor temps  -f/u CBC  -supportive care  2. Other issues:   -care per medicine

## 2019-04-10 NOTE — CHART NOTE - NSCHARTNOTEFT_GEN_A_CORE
Called by RN about critical results as below:                          6.8    1.64  )-----------( 43       ( 10 Apr 2019 05:53 )             20.7         Plan  -1U PRBC STAT  -T&S STAT  -blood transfusion consent to be obtained

## 2019-04-10 NOTE — CONSULT NOTE ADULT - ASSESSMENT
63 y/o Male with h/o metastatic lung cancer with mets to brain and bone s/p whole brain radiation, on maintenance chemotherapy, prior PE on eliquis, was admitted on 4/4 for increased weakness and altered mental status. The patient was more confused, not answering questions appropriately. Weakness has been progressive worse, associated with decreased food and water intake. In ER, he was found with fever to 102F. He received IV vancomycin and azactam, then oseltamivir.    1. Influenza A. Left pleural effusion. Diarrheal syndrome. CDAD. Lung Ca with brain mets. Immunocompromised host.  -on contact and droplet isolation  -no clinical signs of toxic megacolon  -agree with oseltamivir 75 mg PO q12h and vancomycin 125 mg PO q6h  -reason for abx use and side effects reviewed with patient; monitor BMP   -monitor abdomen  -old chart reviewed to assess prior cultures  -monitor temps  -f/u CBC  -supportive care  2. Other issues:   -care per medicine
64yoM hx of metastatic lung cancer mets to brain and bone, S/P whole brain radiation currently on maintenance chemotherapy, H/O PEs on eliquis, admitted on 4/4 for evaluation of one day weakness, fever and confusion, decreased po intake. The patient states he did receive the flu shot this season; otherwise, he cannot provide any history and history per medical record.   1. Patient admitted with Influenza A infection; doubt pneumonia given clear imaging  - follow up cultures   - iv hydration and supportive care   - serial cbc and monitor temperature   - reviewed prior medical records to evaluate for resistant or atypical pathogens   - will stop antibiotics and observe off antibiotics  - continue oseltamivir  - continue isolation  2. other issues; per medicine
64-year-old gentleman with a history of stage IV non-small cell lung cancer. Presently on a maintenance regimen, unclear what the format is. In any event he is admitted here with failure to thrive, dehydration etc. Possible infection. He has a pancytopenia suspect due to treatment.    Plan    In terms of a support group would recommend Neupogen/equivalent until white count is 5.0 or above  Transfuse to keep hemoglobin above 8 g/dL  Platelet transfusion and depression procedure required or if bleeding  In terms of further chemotherapy, this needs to be discussed with his primary oncologist. Purpose of chemotherapy is palliative and the risks/benefits needs to be reassessed given his current admission, pancytopenia, failure to thrive, poor performance status  Patient will follow-up with his primary care oncologist. Of note should patient family request a palliative   consultation this would be very reasonable

## 2019-04-10 NOTE — CHART NOTE - NSCHARTNOTEFT_GEN_A_CORE
Called by RN , pt requesting Ambien for sleep and states it has been helping.    Vital Signs Last 24 Hrs  T(C): 36.6 (10 Apr 2019 20:39), Max: 36.7 (10 Apr 2019 00:25)  T(F): 97.9 (10 Apr 2019 20:39), Max: 98 (10 Apr 2019 00:25)  HR: 93 (10 Apr 2019 20:39) (92 - 109)  BP: 137/76 (10 Apr 2019 20:39) (98/67 - 138/84)  BP(mean): --  RR: 16 (10 Apr 2019 20:39) (16 - 18)  SpO2: 95% (10 Apr 2019 20:39) (93% - 100%)    A/P-  64yoM hx of metastatic lung cancer mets to brain and bone, S/P whole brain radiation currently on maintenance chemotherapy, COPD H/O PEs on eliquis, pw 1 day of weakness with altered mental status.    #Insomnia   -Ambien 5 mg x1 now

## 2019-04-10 NOTE — PROVIDER CONTACT NOTE (CRITICAL VALUE NOTIFICATION) - SITUATION
Pt with critical value of K: 2.9. Left message for Dr. Jin.
MD was already made aware at 22:29 by Sivan MOE that c.diff was positive
Pt with WBC count of 1.06. MD notified. No further orders received. Will continue to monitor
Pt with critical value of Hgb: 6.8, Hct: 20.7. MD notified. Plan to transfuse.

## 2019-04-10 NOTE — PROGRESS NOTE ADULT - ASSESSMENT
· Assessment		  64yoM hx of metastatic lung cancer mets to brain and bone, S/P whole brain radiation currently on maintenance chemotherapy, H/O PEs on eliquis, pw 1 day of weakness with altered mental status. Per wife, patient seemed confused, not answering questions appropriately today. Weakness has been progressive worse today, associated with decreased food and water intake. + sick contacts at home with the flu. He was recently admitted with pneumonia. He denies any nausea, vomiting, abd pain, diarrhea or chest pain. He was found to have fever of 102F in ED. He received IV vancomycin and azactam in ED.     # Acute febrile illness due to influenza  # Cdiff colitis in an immunocompromised patient  # Acute anemia due to chemotherapy  # pancytopenia due to chemotherapy  # Neutropenia  # Lung CA with mets to brain  # Hypokalemia  # Hypomagnesemia  # Hx of PE on eliquis  # DM  # COPD  # Urinary retention      Plan:   - Follow blood cultures: no growth, completed tamiflu, po vanco per ID  - continue nebs and mucinex  - benadryl for contact dermatitis  - replace K and Mg  - oncology eval for Neupogen, 1 unit PRBC today  - Continue flomax and start proscar        Disposition-possibly d/c home 1-2 days  if stable

## 2019-04-11 LAB
ALBUMIN SERPL ELPH-MCNC: 1.8 G/DL — LOW (ref 3.3–5)
ALP SERPL-CCNC: 91 U/L — SIGNIFICANT CHANGE UP (ref 40–120)
ALT FLD-CCNC: 13 U/L — SIGNIFICANT CHANGE UP (ref 12–78)
ANION GAP SERPL CALC-SCNC: 6 MMOL/L — SIGNIFICANT CHANGE UP (ref 5–17)
ANISOCYTOSIS BLD QL: SLIGHT — SIGNIFICANT CHANGE UP
AST SERPL-CCNC: 35 U/L — SIGNIFICANT CHANGE UP (ref 15–37)
BASOPHILS # BLD AUTO: 0 K/UL — SIGNIFICANT CHANGE UP (ref 0–0.2)
BASOPHILS NFR BLD AUTO: 0 % — SIGNIFICANT CHANGE UP (ref 0–2)
BILIRUB SERPL-MCNC: 0.6 MG/DL — SIGNIFICANT CHANGE UP (ref 0.2–1.2)
BUN SERPL-MCNC: 4 MG/DL — LOW (ref 7–23)
CALCIUM SERPL-MCNC: 7.9 MG/DL — LOW (ref 8.5–10.1)
CHLORIDE SERPL-SCNC: 113 MMOL/L — HIGH (ref 96–108)
CO2 SERPL-SCNC: 24 MMOL/L — SIGNIFICANT CHANGE UP (ref 22–31)
CREAT SERPL-MCNC: 0.88 MG/DL — SIGNIFICANT CHANGE UP (ref 0.5–1.3)
DACRYOCYTES BLD QL SMEAR: SLIGHT — SIGNIFICANT CHANGE UP
ELLIPTOCYTES BLD QL SMEAR: SLIGHT — SIGNIFICANT CHANGE UP
EOSINOPHIL # BLD AUTO: 0.05 K/UL — SIGNIFICANT CHANGE UP (ref 0–0.5)
EOSINOPHIL NFR BLD AUTO: 1 % — SIGNIFICANT CHANGE UP (ref 0–6)
GLUCOSE BLDC GLUCOMTR-MCNC: 104 MG/DL — HIGH (ref 70–99)
GLUCOSE BLDC GLUCOMTR-MCNC: 114 MG/DL — HIGH (ref 70–99)
GLUCOSE BLDC GLUCOMTR-MCNC: 90 MG/DL — SIGNIFICANT CHANGE UP (ref 70–99)
GLUCOSE BLDC GLUCOMTR-MCNC: 94 MG/DL — SIGNIFICANT CHANGE UP (ref 70–99)
GLUCOSE SERPL-MCNC: 89 MG/DL — SIGNIFICANT CHANGE UP (ref 70–99)
HCT VFR BLD CALC: 23.1 % — LOW (ref 39–50)
HGB BLD-MCNC: 8 G/DL — LOW (ref 13–17)
LYMPHOCYTES # BLD AUTO: 1.27 K/UL — SIGNIFICANT CHANGE UP (ref 1–3.3)
LYMPHOCYTES # BLD AUTO: 26 % — SIGNIFICANT CHANGE UP (ref 13–44)
MANUAL SMEAR VERIFICATION: SIGNIFICANT CHANGE UP
MCHC RBC-ENTMCNC: 32.4 PG — SIGNIFICANT CHANGE UP (ref 27–34)
MCHC RBC-ENTMCNC: 34.6 GM/DL — SIGNIFICANT CHANGE UP (ref 32–36)
MCV RBC AUTO: 93.5 FL — SIGNIFICANT CHANGE UP (ref 80–100)
METAMYELOCYTES # FLD: 1 % — HIGH (ref 0–0)
MONOCYTES # BLD AUTO: 0.15 K/UL — SIGNIFICANT CHANGE UP (ref 0–0.9)
MONOCYTES NFR BLD AUTO: 3 % — SIGNIFICANT CHANGE UP (ref 2–14)
NEUTROPHILS # BLD AUTO: 3.38 K/UL — SIGNIFICANT CHANGE UP (ref 1.8–7.4)
NEUTROPHILS NFR BLD AUTO: 65 % — SIGNIFICANT CHANGE UP (ref 43–77)
NEUTS BAND # BLD: 4 % — SIGNIFICANT CHANGE UP (ref 0–8)
NRBC # BLD: 1 /100 — HIGH (ref 0–0)
NRBC # BLD: SIGNIFICANT CHANGE UP /100 WBCS (ref 0–0)
PLAT MORPH BLD: NORMAL — SIGNIFICANT CHANGE UP
PLATELET # BLD AUTO: 26 K/UL — LOW (ref 150–400)
PLATELET COUNT - ESTIMATE: ABNORMAL
POIKILOCYTOSIS BLD QL AUTO: SLIGHT — SIGNIFICANT CHANGE UP
POLYCHROMASIA BLD QL SMEAR: SLIGHT — SIGNIFICANT CHANGE UP
POTASSIUM SERPL-MCNC: 3.5 MMOL/L — SIGNIFICANT CHANGE UP (ref 3.5–5.3)
POTASSIUM SERPL-SCNC: 3.5 MMOL/L — SIGNIFICANT CHANGE UP (ref 3.5–5.3)
PROT SERPL-MCNC: 5 GM/DL — LOW (ref 6–8.3)
RBC # BLD: 2.47 M/UL — LOW (ref 4.2–5.8)
RBC # FLD: 16.9 % — HIGH (ref 10.3–14.5)
RBC BLD AUTO: ABNORMAL
SCHISTOCYTES BLD QL AUTO: SLIGHT — SIGNIFICANT CHANGE UP
SODIUM SERPL-SCNC: 143 MMOL/L — SIGNIFICANT CHANGE UP (ref 135–145)
TOXIC GRANULES BLD QL SMEAR: PRESENT — SIGNIFICANT CHANGE UP
WBC # BLD: 4.9 K/UL — SIGNIFICANT CHANGE UP (ref 3.8–10.5)
WBC # FLD AUTO: 4.9 K/UL — SIGNIFICANT CHANGE UP (ref 3.8–10.5)

## 2019-04-11 RX ADMIN — Medication 125 MILLIGRAM(S): at 17:13

## 2019-04-11 RX ADMIN — Medication 3 MILLILITER(S): at 07:32

## 2019-04-11 RX ADMIN — Medication 0.5 MILLIGRAM(S): at 23:43

## 2019-04-11 RX ADMIN — LAMOTRIGINE 100 MILLIGRAM(S): 25 TABLET, ORALLY DISINTEGRATING ORAL at 17:13

## 2019-04-11 RX ADMIN — TAMSULOSIN HYDROCHLORIDE 0.4 MILLIGRAM(S): 0.4 CAPSULE ORAL at 22:07

## 2019-04-11 RX ADMIN — Medication 1200 MILLIGRAM(S): at 05:52

## 2019-04-11 RX ADMIN — Medication 125 MILLIGRAM(S): at 05:52

## 2019-04-11 RX ADMIN — LAMOTRIGINE 100 MILLIGRAM(S): 25 TABLET, ORALLY DISINTEGRATING ORAL at 05:53

## 2019-04-11 RX ADMIN — ATORVASTATIN CALCIUM 40 MILLIGRAM(S): 80 TABLET, FILM COATED ORAL at 22:07

## 2019-04-11 RX ADMIN — Medication 100 MILLIGRAM(S): at 05:52

## 2019-04-11 RX ADMIN — Medication 1 SPRAY(S): at 14:57

## 2019-04-11 RX ADMIN — Medication 1200 MILLIGRAM(S): at 17:13

## 2019-04-11 RX ADMIN — Medication 1 SPRAY(S): at 05:52

## 2019-04-11 RX ADMIN — Medication 300 MICROGRAM(S): at 11:47

## 2019-04-11 RX ADMIN — SODIUM CHLORIDE 70 MILLILITER(S): 9 INJECTION INTRAMUSCULAR; INTRAVENOUS; SUBCUTANEOUS at 21:46

## 2019-04-11 RX ADMIN — APIXABAN 5 MILLIGRAM(S): 2.5 TABLET, FILM COATED ORAL at 05:53

## 2019-04-11 RX ADMIN — FINASTERIDE 5 MILLIGRAM(S): 5 TABLET, FILM COATED ORAL at 11:43

## 2019-04-11 RX ADMIN — Medication 1 SPRAY(S): at 22:07

## 2019-04-11 RX ADMIN — MONTELUKAST 10 MILLIGRAM(S): 4 TABLET, CHEWABLE ORAL at 22:07

## 2019-04-11 RX ADMIN — Medication 100 MILLIGRAM(S): at 14:58

## 2019-04-11 RX ADMIN — Medication 125 MILLIGRAM(S): at 23:43

## 2019-04-11 RX ADMIN — FENTANYL CITRATE 1 PATCH: 50 INJECTION INTRAVENOUS at 22:13

## 2019-04-11 RX ADMIN — Medication 25 MILLIGRAM(S): at 22:07

## 2019-04-11 RX ADMIN — Medication 100 MILLIGRAM(S): at 22:07

## 2019-04-11 RX ADMIN — SODIUM CHLORIDE 70 MILLILITER(S): 9 INJECTION INTRAMUSCULAR; INTRAVENOUS; SUBCUTANEOUS at 05:55

## 2019-04-11 RX ADMIN — Medication 125 MILLIGRAM(S): at 11:43

## 2019-04-11 RX ADMIN — FENTANYL CITRATE 1 PATCH: 50 INJECTION INTRAVENOUS at 02:14

## 2019-04-11 NOTE — PHYSICAL THERAPY INITIAL EVALUATION ADULT - PLANNED THERAPY INTERVENTIONS, PT EVAL
ROM/balance training/gait training/strengthening/transfer training/bed mobility training/stair training,SAFETY

## 2019-04-11 NOTE — PHYSICAL THERAPY INITIAL EVALUATION ADULT - REFERRAL TO ANOTHER SERVICE NEEDED, PT EVAL
?Palliative Medicine Consultation sooner rather than later ?Palliative Medicine Consultation re :GOC sooner rather than later

## 2019-04-11 NOTE — PHYSICAL THERAPY INITIAL EVALUATION ADULT - FOLLOWS COMMANDS/ANSWERS QUESTIONS, REHAB EVAL
75% of the time/specifically DID NOT follow instruction to stay up in chair for awhile and call for help using CB when ready to return to bed ,not to get up alone !!

## 2019-04-11 NOTE — PHYSICAL THERAPY INITIAL EVALUATION ADULT - LEVEL OF INDEPENDENCE: SIT/SUPINE, REHAB EVAL
"tossed" self back into bed from chair without assistance despite instructions to the contrary/independent

## 2019-04-11 NOTE — PHYSICAL THERAPY INITIAL EVALUATION ADULT - PERSONAL SAFETY AND JUDGMENT, REHAB EVAL
gets out of chair as soon as in left room to fetch chair alarm without summoning assistance as instructed/at risk behaviors demonstrated

## 2019-04-11 NOTE — PHYSICAL THERAPY INITIAL EVALUATION ADULT - CRITERIA FOR SKILLED THERAPEUTIC INTERVENTIONS
impairments found/functional limitations in following categories/risk reduction/prevention/anticipated equipment needs at discharge/therapy frequency/predicted duration of therapy intervention/rehab potential/anticipated discharge recommendation

## 2019-04-11 NOTE — PHYSICAL THERAPY INITIAL EVALUATION ADULT - PHYSICAL ASSIST/NONPHYSICAL ASSIST: GAIT, REHAB EVAL
navigational cues using 2 wheeled RW,appears to veer R intermittently/nonverbal cues (demo/gestures)/1 person assist/verbal cues

## 2019-04-11 NOTE — PHYSICAL THERAPY INITIAL EVALUATION ADULT - PATIENT PROFILE REVIEW, REHAB EVAL
pt with metastatic NSCLCA to brain,bones s/p WBRT,on palliative/maintenance chemoRx ,primary oncologist at [facility]/yes

## 2019-04-11 NOTE — PHYSICAL THERAPY INITIAL EVALUATION ADULT - IMPAIRMENTS FOUND, PT EVAL
ventilation and respiration/gas exchange/gait, locomotion, and balance/aerobic capacity/endurance/arousal, attention, and cognition

## 2019-04-11 NOTE — PROGRESS NOTE ADULT - ASSESSMENT
· Assessment		  64yoM hx of metastatic lung cancer mets to brain and bone, S/P whole brain radiation currently on maintenance chemotherapy, H/O PEs on eliquis, pw 1 day of weakness with altered mental status. Per wife, patient seemed confused, not answering questions appropriately today. Weakness has been progressive worse today, associated with decreased food and water intake. + sick contacts at home with the flu. He was recently admitted with pneumonia. He denies any nausea, vomiting, abd pain, diarrhea or chest pain. He was found to have fever of 102F in ED. He received IV vancomycin and azactam in ED.     # Acute febrile illness due to influenza  # Cdiff colitis in an immunocompromised patient  # Acute anemia due to chemotherapy  # pancytopenia due to chemotherapy  # Neutropenia on Nuepogen  # Lung CA with mets to brain  # Hypokalemia  # Hypomagnesemia  # Hx of PE on eliquis  # DM  # COPD  # Urinary retention      Plan:   - Follow blood cultures: no growth, completed tamiflu, po vanco duration per ID  - continue nebs and mucinex  - benadryl for contact dermatitis  - replace K and Mg  - oncology eval for Neupogen  - 1 unit PRBC  - Continue flomax and start proscar  - hold eliquis due to thrombocytopenia        Likely DC tomm

## 2019-04-12 LAB
BASOPHILS # BLD AUTO: 0.06 K/UL — SIGNIFICANT CHANGE UP (ref 0–0.2)
BASOPHILS NFR BLD AUTO: 0.4 % — SIGNIFICANT CHANGE UP (ref 0–2)
EOSINOPHIL # BLD AUTO: 0.25 K/UL — SIGNIFICANT CHANGE UP (ref 0–0.5)
EOSINOPHIL NFR BLD AUTO: 1.7 % — SIGNIFICANT CHANGE UP (ref 0–6)
GLUCOSE BLDC GLUCOMTR-MCNC: 105 MG/DL — HIGH (ref 70–99)
GLUCOSE BLDC GLUCOMTR-MCNC: 106 MG/DL — HIGH (ref 70–99)
GLUCOSE BLDC GLUCOMTR-MCNC: 115 MG/DL — HIGH (ref 70–99)
GLUCOSE BLDC GLUCOMTR-MCNC: 156 MG/DL — HIGH (ref 70–99)
HCT VFR BLD CALC: 25.8 % — LOW (ref 39–50)
HGB BLD-MCNC: 8.9 G/DL — LOW (ref 13–17)
IMM GRANULOCYTES NFR BLD AUTO: 1.7 % — HIGH (ref 0–1.5)
LYMPHOCYTES # BLD AUTO: 1.5 K/UL — SIGNIFICANT CHANGE UP (ref 1–3.3)
LYMPHOCYTES # BLD AUTO: 10.3 % — LOW (ref 13–44)
MCHC RBC-ENTMCNC: 32.5 PG — SIGNIFICANT CHANGE UP (ref 27–34)
MCHC RBC-ENTMCNC: 34.5 GM/DL — SIGNIFICANT CHANGE UP (ref 32–36)
MCV RBC AUTO: 94.2 FL — SIGNIFICANT CHANGE UP (ref 80–100)
MONOCYTES # BLD AUTO: 1.39 K/UL — HIGH (ref 0–0.9)
MONOCYTES NFR BLD AUTO: 9.6 % — SIGNIFICANT CHANGE UP (ref 2–14)
NEUTROPHILS # BLD AUTO: 11.1 K/UL — HIGH (ref 1.8–7.4)
NEUTROPHILS NFR BLD AUTO: 76.3 % — SIGNIFICANT CHANGE UP (ref 43–77)
NRBC # BLD: 0 /100 WBCS — SIGNIFICANT CHANGE UP (ref 0–0)
PLATELET # BLD AUTO: 19 K/UL — CRITICAL LOW (ref 150–400)
RBC # BLD: 2.74 M/UL — LOW (ref 4.2–5.8)
RBC # FLD: 16.5 % — HIGH (ref 10.3–14.5)
WBC # BLD: 14.55 K/UL — HIGH (ref 3.8–10.5)
WBC # FLD AUTO: 14.55 K/UL — HIGH (ref 3.8–10.5)

## 2019-04-12 PROCEDURE — 93010 ELECTROCARDIOGRAM REPORT: CPT

## 2019-04-12 RX ORDER — FOLIC ACID 0.8 MG
1 TABLET ORAL DAILY
Qty: 0 | Refills: 0 | Status: DISCONTINUED | OUTPATIENT
Start: 2019-04-12 | End: 2019-04-15

## 2019-04-12 RX ORDER — FUROSEMIDE 40 MG
20 TABLET ORAL ONCE
Qty: 0 | Refills: 0 | Status: COMPLETED | OUTPATIENT
Start: 2019-04-12 | End: 2019-04-12

## 2019-04-12 RX ADMIN — Medication 125 MILLIGRAM(S): at 11:32

## 2019-04-12 RX ADMIN — Medication 1 MILLIGRAM(S): at 22:18

## 2019-04-12 RX ADMIN — LAMOTRIGINE 100 MILLIGRAM(S): 25 TABLET, ORALLY DISINTEGRATING ORAL at 05:09

## 2019-04-12 RX ADMIN — Medication 20 MILLIGRAM(S): at 02:21

## 2019-04-12 RX ADMIN — TIOTROPIUM BROMIDE 1 CAPSULE(S): 18 CAPSULE ORAL; RESPIRATORY (INHALATION) at 08:05

## 2019-04-12 RX ADMIN — Medication 125 MILLIGRAM(S): at 05:09

## 2019-04-12 RX ADMIN — Medication 1 SPRAY(S): at 05:09

## 2019-04-12 RX ADMIN — FENTANYL CITRATE 1 PATCH: 50 INJECTION INTRAVENOUS at 19:20

## 2019-04-12 RX ADMIN — FINASTERIDE 5 MILLIGRAM(S): 5 TABLET, FILM COATED ORAL at 11:32

## 2019-04-12 RX ADMIN — TAMSULOSIN HYDROCHLORIDE 0.4 MILLIGRAM(S): 0.4 CAPSULE ORAL at 22:18

## 2019-04-12 RX ADMIN — Medication 125 MILLIGRAM(S): at 23:18

## 2019-04-12 RX ADMIN — ATORVASTATIN CALCIUM 40 MILLIGRAM(S): 80 TABLET, FILM COATED ORAL at 22:18

## 2019-04-12 RX ADMIN — MONTELUKAST 10 MILLIGRAM(S): 4 TABLET, CHEWABLE ORAL at 22:18

## 2019-04-12 RX ADMIN — Medication 125 MILLIGRAM(S): at 17:59

## 2019-04-12 RX ADMIN — Medication 1 SPRAY(S): at 13:13

## 2019-04-12 RX ADMIN — FENTANYL CITRATE 1 PATCH: 50 INJECTION INTRAVENOUS at 07:00

## 2019-04-12 RX ADMIN — Medication 1200 MILLIGRAM(S): at 17:59

## 2019-04-12 RX ADMIN — Medication 0.5 MILLIGRAM(S): at 22:18

## 2019-04-12 RX ADMIN — Medication 100 MILLIGRAM(S): at 13:14

## 2019-04-12 RX ADMIN — LAMOTRIGINE 100 MILLIGRAM(S): 25 TABLET, ORALLY DISINTEGRATING ORAL at 17:59

## 2019-04-12 RX ADMIN — Medication 1200 MILLIGRAM(S): at 05:10

## 2019-04-12 RX ADMIN — Medication 100 MILLIGRAM(S): at 22:18

## 2019-04-12 RX ADMIN — Medication 100 MILLIGRAM(S): at 05:09

## 2019-04-12 NOTE — PROVIDER CONTACT NOTE (OTHER) - ACTION/TREATMENT ORDERED:
EKG ordered
CO ordered
Order obtained to straight cath.
Dr. Jin to r/o pt for c-diff, sample to be sent, GI PCR to be ordered
Straight cath now, For urology consult.

## 2019-04-12 NOTE — PROGRESS NOTE ADULT - ASSESSMENT
Stage IV non-small cell lung cancer on chemotherapy immunotherapy  Apparent response to treatment as per last scan/discussion with family  Anemia chronic disease  Progressive thrombocytopenia possibly related to therapy/no evidence of DIC/HIT  History of diarrhea  Failure to thrive    Plan    Patient should receive 1 unit of single donor platelets  We will try and reach his primary care oncologist for further details in terms of his therapy  Continue supportive care  Hydration Stage IV non-small cell lung cancer on chemotherapy immunotherapy  Apparent response to treatment as per last scan/discussion with family  Anemia chronic disease  Progressive thrombocytopenia possibly related to therapy/no evidence of DIC/HIT  History of diarrhea  Failure to thrive    Plan    Patient should receive 1 unit of single donor platelets  We will try and reach his primary care oncologist for further details in terms of his therapy  Continue supportive care  Hydration  patient has been on pemetrexed therefore should continue on folate by mouth daily

## 2019-04-12 NOTE — PROGRESS NOTE ADULT - ASSESSMENT
· Assessment		  64yoM hx of metastatic lung cancer mets to brain and bone, S/P whole brain radiation currently on maintenance chemotherapy, H/O PEs on eliquis, pw 1 day of weakness with altered mental status. Per wife, patient seemed confused, not answering questions appropriately today. Weakness has been progressive worse today, associated with decreased food and water intake. + sick contacts at home with the flu. He was recently admitted with pneumonia. He denies any nausea, vomiting, abd pain, diarrhea or chest pain. He was found to have fever of 102F in ED. He received IV vancomycin and azactam in ED.     # Acute febrile illness due to influenza  # Cdiff colitis in an immunocompromised patient  # Acute anemia due to chemotherapy  # pancytopenia due to chemotherapy  # Neutropenia on Nuepogen  # thrombocytopenia   # Lung CA with mets to brain  # Hypokalemia  # Hypomagnesemia  # Hx of PE on eliquis  # DM  # COPD  # Urinary retention      Plan:   - Follow blood cultures: no growth, completed tamiflu, po vanco duration per ID  - continue nebs and mucinex  - benadryl for contact dermatitis  - DC neupogen since WBC have normalized  - oncology eval for platelet transfusion today  - 1 unit PRBC during admission  - Continue flomax and start proscar  - hold eliquis due to thrombocytopenia        Likely DC to [facility] now given the weakness

## 2019-04-12 NOTE — CHART NOTE - NSCHARTNOTEFT_GEN_A_CORE
Called by RN , pt refusing fluids overnight, states he has had enough fluids and is complaining about his breathing currently. Called by RN , pt refusing fluids overnight, states he has had enough fluids and is complaining about his breathing currently.        A/P-  64yoM hx of metastatic lung cancer mets to brain and bone, S/P whole brain radiation currently on maintenance chemotherapy, COPD H/O PEs on eliquis, pw 1 day of weakness with altered mental status Called by RN , pt refusing fluids overnight, states he has had enough fluids and is complaining about his breathing currently.    Patient seen and examined at bedside, states he was having some trouble breathing. Pt appears comfortable and denies other complaints.    Vital Signs Last 24 Hrs  T(C): 37.1 (11 Apr 2019 20:06), Max: 37.2 (11 Apr 2019 08:10)  T(F): 98.8 (11 Apr 2019 20:06), Max: 99 (11 Apr 2019 08:10)  HR: 102 (11 Apr 2019 20:06) (92 - 104)  BP: 145/81 (11 Apr 2019 20:06) (133/83 - 153/90)  BP(mean): --  RR: 17 (11 Apr 2019 20:06) (17 - 18)  SpO2: 97% (11 Apr 2019 20:06) (97% - 100%)      Physical Exam  General- NAD, resting comfortably  Chest- rales + b/l on anterior lung fields and mildly posteriorly  CV- +S1/S2  Extremities- pulses 2+ b/l      A/P-  64yoM hx of metastatic lung cancer mets to brain and bone, S/P whole brain radiation currently on maintenance chemotherapy, COPD H/O PEs on eliquis, pw 1 day of weakness with altered mental status  -Will hold fluids overnight  -Lasix 20 mg IVP X1  -Continue to monitor overnight  -Management as per primary team

## 2019-04-13 DIAGNOSIS — F05 DELIRIUM DUE TO KNOWN PHYSIOLOGICAL CONDITION: ICD-10-CM

## 2019-04-13 DIAGNOSIS — C79.31 SECONDARY MALIGNANT NEOPLASM OF BRAIN: ICD-10-CM

## 2019-04-13 DIAGNOSIS — C34.90 MALIGNANT NEOPLASM OF UNSPECIFIED PART OF UNSPECIFIED BRONCHUS OR LUNG: ICD-10-CM

## 2019-04-13 DIAGNOSIS — J18.9 PNEUMONIA, UNSPECIFIED ORGANISM: ICD-10-CM

## 2019-04-13 DIAGNOSIS — F31.30 BIPOLAR DISORDER, CURRENT EPISODE DEPRESSED, MILD OR MODERATE SEVERITY, UNSPECIFIED: ICD-10-CM

## 2019-04-13 DIAGNOSIS — R41.0 DISORIENTATION, UNSPECIFIED: ICD-10-CM

## 2019-04-13 LAB
ANION GAP SERPL CALC-SCNC: 9 MMOL/L — SIGNIFICANT CHANGE UP (ref 5–17)
BUN SERPL-MCNC: 4 MG/DL — LOW (ref 7–23)
CALCIUM SERPL-MCNC: 8.2 MG/DL — LOW (ref 8.5–10.1)
CHLORIDE SERPL-SCNC: 111 MMOL/L — HIGH (ref 96–108)
CO2 SERPL-SCNC: 23 MMOL/L — SIGNIFICANT CHANGE UP (ref 22–31)
CREAT SERPL-MCNC: 0.83 MG/DL — SIGNIFICANT CHANGE UP (ref 0.5–1.3)
GLUCOSE BLDC GLUCOMTR-MCNC: 112 MG/DL — HIGH (ref 70–99)
GLUCOSE BLDC GLUCOMTR-MCNC: 116 MG/DL — HIGH (ref 70–99)
GLUCOSE BLDC GLUCOMTR-MCNC: 171 MG/DL — HIGH (ref 70–99)
GLUCOSE BLDC GLUCOMTR-MCNC: 96 MG/DL — SIGNIFICANT CHANGE UP (ref 70–99)
GLUCOSE SERPL-MCNC: 103 MG/DL — HIGH (ref 70–99)
HCT VFR BLD CALC: 23.2 % — LOW (ref 39–50)
HGB BLD-MCNC: 8 G/DL — LOW (ref 13–17)
LACTATE SERPL-SCNC: 1.2 MMOL/L — SIGNIFICANT CHANGE UP (ref 0.7–2)
MCHC RBC-ENTMCNC: 32.4 PG — SIGNIFICANT CHANGE UP (ref 27–34)
MCHC RBC-ENTMCNC: 34.5 GM/DL — SIGNIFICANT CHANGE UP (ref 32–36)
MCV RBC AUTO: 93.9 FL — SIGNIFICANT CHANGE UP (ref 80–100)
NRBC # BLD: 0 /100 WBCS — SIGNIFICANT CHANGE UP (ref 0–0)
NT-PROBNP SERPL-SCNC: 891 PG/ML — HIGH (ref 0–125)
PLATELET # BLD AUTO: 57 K/UL — LOW (ref 150–400)
POTASSIUM SERPL-MCNC: 3.1 MMOL/L — LOW (ref 3.5–5.3)
POTASSIUM SERPL-SCNC: 3.1 MMOL/L — LOW (ref 3.5–5.3)
RBC # BLD: 2.47 M/UL — LOW (ref 4.2–5.8)
RBC # FLD: 15.9 % — HIGH (ref 10.3–14.5)
SODIUM SERPL-SCNC: 143 MMOL/L — SIGNIFICANT CHANGE UP (ref 135–145)
WBC # BLD: 14.21 K/UL — HIGH (ref 3.8–10.5)
WBC # FLD AUTO: 14.21 K/UL — HIGH (ref 3.8–10.5)

## 2019-04-13 PROCEDURE — 71250 CT THORAX DX C-: CPT | Mod: 26

## 2019-04-13 PROCEDURE — 90792 PSYCH DIAG EVAL W/MED SRVCS: CPT

## 2019-04-13 PROCEDURE — 70450 CT HEAD/BRAIN W/O DYE: CPT | Mod: 26

## 2019-04-13 PROCEDURE — 93010 ELECTROCARDIOGRAM REPORT: CPT

## 2019-04-13 RX ORDER — SODIUM CHLORIDE 9 MG/ML
1000 INJECTION INTRAMUSCULAR; INTRAVENOUS; SUBCUTANEOUS
Qty: 0 | Refills: 0 | Status: DISCONTINUED | OUTPATIENT
Start: 2019-04-13 | End: 2019-04-15

## 2019-04-13 RX ORDER — FENTANYL CITRATE 50 UG/ML
1 INJECTION INTRAVENOUS
Qty: 0 | Refills: 0 | Status: DISCONTINUED | OUTPATIENT
Start: 2019-04-13 | End: 2019-04-15

## 2019-04-13 RX ORDER — POLYMYXIN B SULF/TRIMETHOPRIM 10000-1/ML
1 DROPS OPHTHALMIC (EYE) EVERY 4 HOURS
Qty: 0 | Refills: 0 | Status: DISCONTINUED | OUTPATIENT
Start: 2019-04-13 | End: 2019-04-15

## 2019-04-13 RX ORDER — POTASSIUM CHLORIDE 20 MEQ
40 PACKET (EA) ORAL EVERY 4 HOURS
Qty: 0 | Refills: 0 | Status: COMPLETED | OUTPATIENT
Start: 2019-04-13 | End: 2019-04-13

## 2019-04-13 RX ORDER — IPRATROPIUM/ALBUTEROL SULFATE 18-103MCG
3 AEROSOL WITH ADAPTER (GRAM) INHALATION EVERY 6 HOURS
Qty: 0 | Refills: 0 | Status: DISCONTINUED | OUTPATIENT
Start: 2019-04-13 | End: 2019-04-15

## 2019-04-13 RX ORDER — VANCOMYCIN HCL 1 G
500 VIAL (EA) INTRAVENOUS EVERY 6 HOURS
Qty: 0 | Refills: 0 | Status: DISCONTINUED | OUTPATIENT
Start: 2019-04-13 | End: 2019-04-15

## 2019-04-13 RX ADMIN — Medication 10 MILLIGRAM(S): at 15:39

## 2019-04-13 RX ADMIN — Medication 100 MILLIGRAM(S): at 22:03

## 2019-04-13 RX ADMIN — Medication 500 MILLIGRAM(S): at 17:41

## 2019-04-13 RX ADMIN — LAMOTRIGINE 100 MILLIGRAM(S): 25 TABLET, ORALLY DISINTEGRATING ORAL at 05:43

## 2019-04-13 RX ADMIN — FENTANYL CITRATE 1 PATCH: 50 INJECTION INTRAVENOUS at 17:34

## 2019-04-13 RX ADMIN — Medication 1 DROP(S): at 22:03

## 2019-04-13 RX ADMIN — Medication 100 MILLIGRAM(S): at 05:42

## 2019-04-13 RX ADMIN — Medication 125 MILLIGRAM(S): at 05:43

## 2019-04-13 RX ADMIN — Medication 40 MILLIEQUIVALENT(S): at 12:28

## 2019-04-13 RX ADMIN — Medication 1200 MILLIGRAM(S): at 05:42

## 2019-04-13 RX ADMIN — Medication 3 MILLILITER(S): at 14:44

## 2019-04-13 RX ADMIN — Medication 1200 MILLIGRAM(S): at 17:30

## 2019-04-13 RX ADMIN — Medication 125 MILLIGRAM(S): at 12:29

## 2019-04-13 RX ADMIN — SODIUM CHLORIDE 80 MILLILITER(S): 9 INJECTION INTRAMUSCULAR; INTRAVENOUS; SUBCUTANEOUS at 12:29

## 2019-04-13 RX ADMIN — Medication 40 MILLIEQUIVALENT(S): at 18:02

## 2019-04-13 RX ADMIN — FENTANYL CITRATE 1 PATCH: 50 INJECTION INTRAVENOUS at 17:31

## 2019-04-13 RX ADMIN — ATORVASTATIN CALCIUM 40 MILLIGRAM(S): 80 TABLET, FILM COATED ORAL at 22:03

## 2019-04-13 RX ADMIN — MONTELUKAST 10 MILLIGRAM(S): 4 TABLET, CHEWABLE ORAL at 22:03

## 2019-04-13 RX ADMIN — FENTANYL CITRATE 1 PATCH: 50 INJECTION INTRAVENOUS at 07:40

## 2019-04-13 RX ADMIN — FENTANYL CITRATE 1 PATCH: 50 INJECTION INTRAVENOUS at 19:45

## 2019-04-13 RX ADMIN — Medication 100 MILLIGRAM(S): at 15:38

## 2019-04-13 RX ADMIN — FINASTERIDE 5 MILLIGRAM(S): 5 TABLET, FILM COATED ORAL at 12:28

## 2019-04-13 RX ADMIN — Medication 1 MILLIGRAM(S): at 12:28

## 2019-04-13 RX ADMIN — TAMSULOSIN HYDROCHLORIDE 0.4 MILLIGRAM(S): 0.4 CAPSULE ORAL at 22:03

## 2019-04-13 NOTE — BEHAVIORAL HEALTH ASSESSMENT NOTE - HPI (INCLUDE ILLNESS QUALITY, SEVERITY, DURATION, TIMING, CONTEXT, MODIFYING FACTORS, ASSOCIATED SIGNS AND SYMPTOMS)
Pt si a 64 YOWMM withy  past psychiatric hx, of possible bipolar illness, on Lamictal for a long time, but not seign [id number] at this time, presents inattentive with fluctuating sensorium and no agitation.  per medical records :  "Patient with a history of metastatic non-small cell lung cancer with history of brain metastases, systemic metastases currently cared for at [facility] [place]. According to the wife the patient is on pemetrexed and immunotherapy. Last scans were favorable. He was weakness, fatigue, cytopenia, anemia progressive thrombocytopenia and failure to thrive. History of a diarrhea. Cultures negative. Patient on antibiotic for possible pneumonia  Presently has a progressive thrombocytopenia. Discussed this with the patient and wife and apparently this has occurred before and required platelet transfusions."  Pt states he does feel depressed, and his appetite is poor. Sleep is good. He has no SI, HI, AH, Vh, PI.   Per wife and son, he had fluctuating sensorium and "coming and gong" with confusion before hospitalization. Wife is predominantly concerned about his poor PO intake. But he is aware o his hx of brain metastases of nonsmall CA of the lung.  Pt is  DNR and they still have not had any palliative care involvement.  Per wife, pt was on trazodone for sleep and she thinks he si now on Remeron, but no Remeron is ordered for appetite. Pt si a 64 YOWMM withy  past psychiatric hx, of possible bipolar illness, on Lamictal for a long time, but not seign [id number] at this time, presents inattentive with fluctuating sensorium and no agitation.  per medical records :  "Patient with a history of metastatic non-small cell lung cancer with history of brain metastases, systemic metastases currently cared for at [facility] [place]. According to the wife the patient is on pemetrexed and immunotherapy. Last scans were favorable. He was weakness, fatigue, cytopenia, anemia progressive thrombocytopenia and failure to thrive. History of a diarrhea. Cultures negative. Patient on antibiotic for possible pneumonia  Presently has a progressive thrombocytopenia. Discussed this with the patient and wife and apparently this has occurred before and required platelet transfusions."  Pt states he does feel depressed, and his appetite is poor. Sleep is good. He has no SI, HI, AH, Vh, PI.   Per wife and son, he had fluctuating sensorium and "coming and gong" with confusion before hospitalization. Wife is predominantly concerned about his poor PO intake. But he is aware o his hx of brain metastases of nonsmall CA of the lung.  Pt is  DNR and they still have not had any palliative care involvement.  Per wife, pt was on trazodone for sleep and she thinks he is now on Remeron, but no Remeron is ordered for appetite.

## 2019-04-13 NOTE — CHART NOTE - NSCHARTNOTEFT_GEN_A_CORE
Notified by RN around 2000 this evening that this patient has eye redness. Family at bedside would like patient to be assessed. As per RN , pt has chronic periorbital edema. Pt w Notified by RN around 2000 this evening that this patient has eye redness. Family at bedside would like patient to be assessed. As per RN , pt has chronic periorbital edema. Pt seen and examined at bedside. He is noted to have sensitivity to light. Patient's brother in law noted that both eyes were red (Right >Left). Notified by RN around 2000 this evening that this patient with metastatic lung ca presents with eye redness. Family at bedside would like patient to be assessed. As per RN , pt has chronic periorbital edema, but erythema is new. Pt seen and examined at bedside. He is noted to have sensitivity to light, and is barely able to open his eyes. Patient's brother in law noted that both eyes were red (Right >Left) and patient seemed to be able to open them better earlier today. Pt denies any pain with eye movements but describes having eye pain with light source. Patient is on constant observation for agitation.     Exam  Gen: frail, elderly deconditioned man   Eye: Bilateral periorbital edema with associated erythema; Right eye with crusting on lids, sensitive to light; Left eye opens slightly. EOMI intact, conjunctival erythema    A/P: 63 yo M as described above with bilateral eye redness with crusting and purulence. Possible bacterial conjunctivitis  - STAT Trimethoprim/polymyxin B drops q4h  - Will continue to monitor  - Will sign out to hospitalist for followup in the morning  - Consider Ophtho consultation if worsens clinically    Eufemia Gomez MD PGY2  Plan discussed with RN and family at bedside

## 2019-04-13 NOTE — PROGRESS NOTE ADULT - ASSESSMENT
· Assessment		  64yoM hx of metastatic lung cancer mets to brain and bone, S/P whole brain radiation currently on maintenance chemotherapy, H/O PEs on eliquis, pw 1 day of weakness with altered mental status. Per wife, patient seemed confused, not answering questions appropriately today. Weakness has been progressive worse today, associated with decreased food and water intake. + sick contacts at home with the flu. He was recently admitted with pneumonia. He denies any nausea, vomiting, abd pain, diarrhea or chest pain. He was found to have fever of 102F in ED. He received IV vancomycin and azactam in ED.     # Acute febrile illness due to influenza  # Cdiff colitis in an immunocompromised patient  # Acute anemia due to chemotherapy  # pancytopenia due to chemotherapy  # Neutropenia on Nuepogen  # thrombocytopenia   # Lung CA with mets to brain  # Hypokalemia  # Hypomagnesemia  # Hx of PE on eliquis  # DM  # COPD  # Urinary retention      Plan:   - Follow blood cultures: no growth, completed tamiflu, po vanco duration per ID  - was taking po prednisone at home, will discuss with ID to restart as wife wants him to restart  - CT of the chest did not show any signs of PNA  - CT of the head noted  - Lactic acid is normal  - BNP noted but no signs of effusions on CT of the chest, continue IVF, encourage po hydration and diet  - continue nebs and mucinex  - benadryl for contact dermatitis  - DC neupogen since WBC have normalized  - oncology eval for platelet transfusion today  - 1 unit PRBC during admission  - Continue flomax and start proscar  - hold eliquis due to thrombocytopenia        Likely DC to [facility] now given the weakness

## 2019-04-13 NOTE — BEHAVIORAL HEALTH ASSESSMENT NOTE - NSBHCHARTREVIEWVS_PSY_A_CORE FT
Vital Signs Last 24 Hrs  T(C): 36.5 (13 Apr 2019 13:14), Max: 36.9 (12 Apr 2019 16:51)  T(F): 97.7 (13 Apr 2019 13:14), Max: 98.5 (12 Apr 2019 17:10)  HR: 103 (13 Apr 2019 13:14) (102 - 112)  BP: 139/91 (13 Apr 2019 13:14) (112/77 - 148/87)  BP(mean): --  RR: 19 (13 Apr 2019 13:14) (17 - 19)  SpO2: 99% (13 Apr 2019 13:14) (95% - 100%)

## 2019-04-13 NOTE — BEHAVIORAL HEALTH ASSESSMENT NOTE - NSBHCHARTREVIEWLAB_PSY_A_CORE FT
8.0    14.21 )-----------( 57       ( 13 Apr 2019 05:50 )             23.2   04-13    143  |  111<H>  |  4<L>  ----------------------------<  103<H>  3.1<L>   |  23  |  0.83    Ca    8.2<L>      13 Apr 2019 05:50

## 2019-04-13 NOTE — BEHAVIORAL HEALTH ASSESSMENT NOTE - RISK ASSESSMENT
LOW. PT has no hx fo psych hospitalization, no hx of SA, denies current SI. However he has lung ca with systemic mets including brain.   NO substance abuse, Has insomnia.   Family si supportive.

## 2019-04-13 NOTE — BEHAVIORAL HEALTH ASSESSMENT NOTE - PROBLEM SELECTOR PLAN 1
d/c lamictal and avoid psychotropics except haldol 2 mg PRN agitation. Would use Seroquel 12.5 mg at HS for insomnia and hx of bipolar depression.   Avoid benzodiazepines and meds with anticholinergic side effects.

## 2019-04-13 NOTE — BEHAVIORAL HEALTH ASSESSMENT NOTE - SUMMARY
Pt si a 64 YOWMM withy  past psychiatric hx, of possible bipolar illness, on Lamictal for a long time, but not seign [id number] at this time, presents inattentive with fluctuating sensorium and no agitation.  per medical records with PMHX history of metastatic non-small cell lung cancer with history of brain metastases, systemic metastases currently cared for at [facility] [place], History of a diarrhea. Cultures negative. Patient on antibiotic for possible pneumonia  Presently has a progressive thrombocytopenia. Pt states he does feel depressed, and his appetite is poor. Sleep is good. He has no SI, HI, AH, Vh, PI.  Per wife and son, he had fluctuating sensorium and "coming and gong" with confusion before hospitalization. Wife is predominantly concerned about his poor PO intake. But he is aware o his hx of brain metastases of nonsmall CA of the lung.  Pt is  DNR and they still have not had any palliative care involvement.  Pt is not suicidal or homicidal, not at imminent risk to harm self and others does not need inpatient level of care.   PT presents delirious due to pneumonia and bran metastases.   d/c lamictal and avoid any other psychotropic agents except haldol 2 mg PRN agitation.   Use Seroquel 12.5mg to address insomnia and underlying bipolar depression.   Treat underlying medical condition.   consider neuro consult re: seizure risk with brain metastases,   consider palliative care consult.   Consider dietitian consult for failure to thrive.

## 2019-04-14 ENCOUNTER — TRANSCRIPTION ENCOUNTER (OUTPATIENT)
Age: 64
End: 2019-04-14

## 2019-04-14 LAB
ANION GAP SERPL CALC-SCNC: 8 MMOL/L — SIGNIFICANT CHANGE UP (ref 5–17)
BUN SERPL-MCNC: 6 MG/DL — LOW (ref 7–23)
CALCIUM SERPL-MCNC: 8.2 MG/DL — LOW (ref 8.5–10.1)
CHLORIDE SERPL-SCNC: 112 MMOL/L — HIGH (ref 96–108)
CO2 SERPL-SCNC: 22 MMOL/L — SIGNIFICANT CHANGE UP (ref 22–31)
CREAT SERPL-MCNC: 0.93 MG/DL — SIGNIFICANT CHANGE UP (ref 0.5–1.3)
GLUCOSE BLDC GLUCOMTR-MCNC: 125 MG/DL — HIGH (ref 70–99)
GLUCOSE BLDC GLUCOMTR-MCNC: 182 MG/DL — HIGH (ref 70–99)
GLUCOSE BLDC GLUCOMTR-MCNC: 222 MG/DL — HIGH (ref 70–99)
GLUCOSE BLDC GLUCOMTR-MCNC: 83 MG/DL — SIGNIFICANT CHANGE UP (ref 70–99)
GLUCOSE SERPL-MCNC: 120 MG/DL — HIGH (ref 70–99)
HCT VFR BLD CALC: 24.4 % — LOW (ref 39–50)
HGB BLD-MCNC: 8.5 G/DL — LOW (ref 13–17)
MCHC RBC-ENTMCNC: 32.4 PG — SIGNIFICANT CHANGE UP (ref 27–34)
MCHC RBC-ENTMCNC: 34.8 GM/DL — SIGNIFICANT CHANGE UP (ref 32–36)
MCV RBC AUTO: 93.1 FL — SIGNIFICANT CHANGE UP (ref 80–100)
NRBC # BLD: 0 /100 WBCS — SIGNIFICANT CHANGE UP (ref 0–0)
PLATELET # BLD AUTO: 53 K/UL — LOW (ref 150–400)
POTASSIUM SERPL-MCNC: 3.8 MMOL/L — SIGNIFICANT CHANGE UP (ref 3.5–5.3)
POTASSIUM SERPL-SCNC: 3.8 MMOL/L — SIGNIFICANT CHANGE UP (ref 3.5–5.3)
RBC # BLD: 2.62 M/UL — LOW (ref 4.2–5.8)
RBC # FLD: 15.8 % — HIGH (ref 10.3–14.5)
SODIUM SERPL-SCNC: 142 MMOL/L — SIGNIFICANT CHANGE UP (ref 135–145)
WBC # BLD: 10.8 K/UL — HIGH (ref 3.8–10.5)
WBC # FLD AUTO: 10.8 K/UL — HIGH (ref 3.8–10.5)

## 2019-04-14 RX ORDER — ZOLPIDEM TARTRATE 10 MG/1
5 TABLET ORAL ONCE
Qty: 0 | Refills: 0 | Status: DISCONTINUED | OUTPATIENT
Start: 2019-04-14 | End: 2019-04-15

## 2019-04-14 RX ORDER — FAMOTIDINE 10 MG/ML
20 INJECTION INTRAVENOUS
Qty: 0 | Refills: 0 | Status: DISCONTINUED | OUTPATIENT
Start: 2019-04-14 | End: 2019-04-15

## 2019-04-14 RX ADMIN — TIOTROPIUM BROMIDE 1 CAPSULE(S): 18 CAPSULE ORAL; RESPIRATORY (INHALATION) at 07:59

## 2019-04-14 RX ADMIN — Medication 500 MILLIGRAM(S): at 09:45

## 2019-04-14 RX ADMIN — Medication 500 MILLIGRAM(S): at 22:42

## 2019-04-14 RX ADMIN — Medication 500 MILLIGRAM(S): at 12:24

## 2019-04-14 RX ADMIN — Medication 100 MILLIGRAM(S): at 12:24

## 2019-04-14 RX ADMIN — Medication 1200 MILLIGRAM(S): at 17:56

## 2019-04-14 RX ADMIN — Medication 3 MILLILITER(S): at 22:07

## 2019-04-14 RX ADMIN — Medication 1 DROP(S): at 19:44

## 2019-04-14 RX ADMIN — Medication 10 MILLIGRAM(S): at 06:23

## 2019-04-14 RX ADMIN — Medication 4: at 17:56

## 2019-04-14 RX ADMIN — TAMSULOSIN HYDROCHLORIDE 0.4 MILLIGRAM(S): 0.4 CAPSULE ORAL at 22:42

## 2019-04-14 RX ADMIN — Medication 1 DROP(S): at 13:09

## 2019-04-14 RX ADMIN — FENTANYL CITRATE 1 PATCH: 50 INJECTION INTRAVENOUS at 19:40

## 2019-04-14 RX ADMIN — Medication 3 MILLILITER(S): at 07:58

## 2019-04-14 RX ADMIN — Medication 2: at 12:23

## 2019-04-14 RX ADMIN — Medication 100 MILLIGRAM(S): at 22:42

## 2019-04-14 RX ADMIN — Medication 1 DROP(S): at 09:44

## 2019-04-14 RX ADMIN — ATORVASTATIN CALCIUM 40 MILLIGRAM(S): 80 TABLET, FILM COATED ORAL at 22:42

## 2019-04-14 RX ADMIN — Medication 3 MILLILITER(S): at 13:45

## 2019-04-14 RX ADMIN — FINASTERIDE 5 MILLIGRAM(S): 5 TABLET, FILM COATED ORAL at 12:24

## 2019-04-14 RX ADMIN — SODIUM CHLORIDE 80 MILLILITER(S): 9 INJECTION INTRAMUSCULAR; INTRAVENOUS; SUBCUTANEOUS at 22:49

## 2019-04-14 RX ADMIN — SODIUM CHLORIDE 80 MILLILITER(S): 9 INJECTION INTRAMUSCULAR; INTRAVENOUS; SUBCUTANEOUS at 01:06

## 2019-04-14 RX ADMIN — FAMOTIDINE 20 MILLIGRAM(S): 10 INJECTION INTRAVENOUS at 13:16

## 2019-04-14 RX ADMIN — FAMOTIDINE 20 MILLIGRAM(S): 10 INJECTION INTRAVENOUS at 17:56

## 2019-04-14 RX ADMIN — Medication 500 MILLIGRAM(S): at 17:56

## 2019-04-14 RX ADMIN — MONTELUKAST 10 MILLIGRAM(S): 4 TABLET, CHEWABLE ORAL at 22:42

## 2019-04-14 RX ADMIN — Medication 1 MILLIGRAM(S): at 12:24

## 2019-04-14 RX ADMIN — FENTANYL CITRATE 1 PATCH: 50 INJECTION INTRAVENOUS at 09:43

## 2019-04-14 NOTE — PROGRESS NOTE ADULT - REASON FOR ADMISSION
Fever, weakness

## 2019-04-14 NOTE — PROGRESS NOTE ADULT - PROVIDER SPECIALTY LIST ADULT
Heme/Onc
Hospitalist
Infectious Disease
Infectious Disease
Hospitalist
Hospitalist

## 2019-04-14 NOTE — DISCHARGE NOTE PROVIDER - HOSPITAL COURSE
HPI: 64yoM hx of metastatic lung cancer mets to brain and bone, S/P whole brain radiation currently on maintenance chemotherapy, H/O PEs on eliquis, pw 1 day of weakness with altered mental status. Per wife, patient seemed confused, not answering questions appropriately today. Weakness has been progressive worse today, associated with decreased food and water intake. + sick contacts at home with the flu. He was recently admitted with pneumonia. He denies any nausea, vomiting, abd pain, diarrhea or chest pain. He was found to have fever of 102F in ED. He received IV vancomycin and azactam in ED.         04/09/19: pt on CO for agitation, improving mentation, still congested on exam, d/w wife. Oncologist is at [facility], neutropenic        4/10/19: pt off CO, more oriented, completed tamiflu, no distress, breathing better, cough improving, neutropenic precaution, hemeonc eval pending. discussed with wife that patient is pancytopenic, will need 1 unit PRBC today and possibly neupogen.        4/11/19: Pt with thrombocytopenia, WBC normalizing with neupogen, cdiff (diarrhea x 5) last night, PT evaluation.         4/12/19 Pt with thrombocytopenia, d/w , to transfuse platelets today, d/w wife in detail. wife prefers [facility] at this time given the weakness, had 3 stools overtime and 2 in AM.         4/13/19 pt seen and examined, feels weak, was oriented to place/self/time after asking him questions. took long to answer. According to wife and 1:1, he does not eat much. He continues to have diarrhea.         4/14/19 pt seen and examined, much more alert and awake today. oriented to place/self and time.         Hospital course: Patient was admitted for AMS and fatigue, Fever. He was noted to have Influenza and was tx with Tamiflu. He was noted to have cdiff and is being treated with vancomycin 125 mg PO Q 6 hours. He was not improving and was noted to have neutropenia/thrombocytopenia/anemia. He received a PRBC, platelet and neupogen to counteract the efffects of the chemotherapy and infection. His vancomycin dose was increased to 500mg PO Q hours. His home medication of prednisone 10mg daily was reinstated.             PHYSICAL EXAM:        Constitutional: NAD, awake and alert, well-developed    HEENT: PERR, EOMI, Normal Hearing, MMM    Neck: Soft and supple, No LAD, No JVD    Respiratory: Breath sounds are clear bilaterally, No wheezing, rales or rhonchi    Cardiovascular: S1 and S2, regular rate and rhythm, no Murmurs, gallops or rubs    Gastrointestinal: Bowel Sounds present, soft, nontender, nondistended, no guarding, no rebound    Extremities: No peripheral edema    Vascular: 2+ peripheral pulses    Neurological: A/O x 3, no focal deficits    Musculoskeletal: 5/5 strength b/l upper and lower extremities    Skin: No rashes        total time for discharge: 45 minutes HPI: 64yoM hx of metastatic lung cancer mets to brain and bone, S/P whole brain radiation currently on maintenance chemotherapy, H/O PEs on eliquis, pw 1 day of weakness with altered mental status. Per wife, patient seemed confused, not answering questions appropriately today. Weakness has been progressive worse today, associated with decreased food and water intake. + sick contacts at home with the flu. He was recently admitted with pneumonia. He denies any nausea, vomiting, abd pain, diarrhea or chest pain. He was found to have fever of 102F in ED. He received IV vancomycin and azactam in ED.                 Hospital course: Patient was admitted for AMS and fatigue, Fever. He was noted to have Influenza and was tx with Tamiflu. He was noted to have cdiff and is being treated with vancomycin 125 mg PO Q 6 hours. He was not improving and was noted to have neutropenia/thrombocytopenia/anemia. He received a PRBC, platelet and neupogen to counteract the efffects of the chemotherapy and infection. His vancomycin dose was increased to 500mg PO Q hours. His home medication of prednisone 10mg daily was reinstated.             PHYSICAL EXAM:        Constitutional: NAD, awake and alert, well-developed    HEENT: PERR, EOMI, Normal Hearing, MMM    Neck: Soft and supple, No LAD, No JVD    Respiratory: Breath sounds are clear bilaterally, No wheezing, rales or rhonchi    Cardiovascular: S1 and S2, regular rate and rhythm, no Murmurs, gallops or rubs    Gastrointestinal: Bowel Sounds present, soft, nontender, nondistended, no guarding, no rebound    Extremities: No peripheral edema    Vascular: 2+ peripheral pulses    Neurological: A/O x 3, no focal deficits    Musculoskeletal: 5/5 strength b/l upper and lower extremities    Skin: No rashes         Acute febrile illness due to influenza    # Cdiff colitis in an immunocompromised patient    # Acute anemia due to chemotherapy    # pancytopenia due to chemotherapy    # Neutropenia on Nuepogen    # thrombocytopenia     # Lung CA with mets to brain    # Hypokalemia    # Hypomagnesemia    # Hx of PE on eliquis    # DM    # COPD    # Urinary retention            Plan:     - Follow blood cultures: no growth, completed tamiflu, po vanco duration per ID    - was taking po prednisone at home, ? if for brain mets, restarted 4/13/19, increased vanco dose to 500 Q 6 hours due to prednisone and poor response/improvement    - oncology eval for platelet transfusion today    - 1 unit PRBC today    - Continue flomax and start proscar    - hold eliquis due to thrombocytopenia; reeval at rehab re: anticoagulation plts at 50, if continue to rise start Eliquis HPI: 64yoM hx of metastatic lung cancer mets to brain and bone, S/P whole brain radiation currently on maintenance chemotherapy, H/O PEs on eliquis, pw 1 day of weakness with altered mental status. Per wife, patient seemed confused, not answering questions appropriately today. Weakness has been progressive worse today, associated with decreased food and water intake. + sick contacts at home with the flu. He was recently admitted with pneumonia. He denies any nausea, vomiting, abd pain, diarrhea or chest pain. He was found to have fever of 102F in ED. He received IV vancomycin and azactam in ED.                 Hospital course: Patient was admitted for AMS and fatigue, Fever. He was noted to have Influenza and was tx with Tamiflu. He was noted to have cdiff and is being treated with vancomycin 125 mg PO Q 6 hours. He was not improving and was noted to have neutropenia/thrombocytopenia/anemia. He received a PRBC, platelet and neupogen to counteract the efffects of the chemotherapy and infection. His vancomycin dose was increased to 500mg PO Q hours. His home medication of prednisone 10mg daily was reinstated.             PHYSICAL EXAM:        Constitutional: NAD, awake and alert, well-developed    HEENT: PERR, EOMI, Normal Hearing, MMM    Neck: Soft and supple, No LAD, No JVD    Respiratory: Breath sounds are clear bilaterally, No wheezing, rales or rhonchi    Cardiovascular: S1 and S2, regular rate and rhythm, no Murmurs, gallops or rubs    Gastrointestinal: Bowel Sounds present, soft, nontender, nondistended, no guarding, no rebound    Extremities: No peripheral edema    Vascular: 2+ peripheral pulses    Neurological: A/O x 3, no focal deficits    Musculoskeletal: 5/5 strength b/l upper and lower extremities    Skin: No rashes            Acute febrile illness due to influenza    # Cdiff colitis in an immunocompromised patient    # Acute anemia due to chemotherapy    # pancytopenia due to chemotherapy    # Neutropenia on Nuepogen    # thrombocytopenia     # Lung CA with mets to brain    # Hypokalemia    # Hypomagnesemia    # Hx of PE on eliquis    # DM    # COPD    # Urinary retention            Plan:     - Follow blood cultures: no growth, completed tamiflu, po vanco duration per ID    - was taking po prednisone at home, ? if for brain mets, restarted 4/13/19, increased vanco dose to 500 Q 6 hours due to prednisone and poor response/improvement    - CT of the chest did not show any signs of PNA    - Continue flomax and start proscar    - hold eliquis due to thrombocytopenia resume at rehab when number up>60,000                     Acute febrile illness due to influenza    # Cdiff colitis in an immunocompromised patient    # Acute anemia due to chemotherapy    # pancytopenia due to chemotherapy    # Neutropenia on Nuepogen    # thrombocytopenia     # Lung CA with mets to brain    # Hypokalemia    # Hypomagnesemia    # Hx of PE on eliquis    # DM    # COPD    # Urinary retention            Plan:     - Follow blood cultures: no growth, completed tamiflu, po vanco duration per ID    - was taking po prednisone at home, ? if for brain mets, restarted 4/13/19, increased vanco dose to 500 Q 6 hours due to prednisone and poor response/improvement    - oncology eval for platelet transfusion today    - 1 unit PRBC today    - Continue flomax and start proscar    - hold eliquis due to thrombocytopenia; reeval at rehab re: anticoagulation plts at 50, if continue to rise start Eliquis    Med rec reviewed by Pharmacy I     resumed Remeron

## 2019-04-14 NOTE — DISCHARGE NOTE PROVIDER - PROVIDER TOKENS
PROVIDER:[TOKEN:[[id number]]],PROVIDER:[TOKEN:[[id number]]],FREE:[LAST:[His own Oncologist],FIRST:[ in [place]],PHONE:[(   )    -],FAX:[(   )    -]]

## 2019-04-14 NOTE — DISCHARGE NOTE PROVIDER - CARE PROVIDER_API CALL
Fortunato Doll)  Hematology; Internal Medicine; Medical Oncology  [address] [place] [address]  Phone: [phone]  Fax: [phone]  Follow Up Time:     Yvrose Franks ()  Infectious Disease; Internal Medicine  [address], [address]  [place] [address]  Phone: [phone]  Fax: [phone]  Follow Up Time:     His own Oncologist,  in [place]  Phone: (   )    -  Fax: (   )    -  Follow Up Time:

## 2019-04-14 NOTE — PROGRESS NOTE ADULT - SUBJECTIVE AND OBJECTIVE BOX
64yoM hx of metastatic lung cancer mets to brain and bone, S/P whole brain radiation currently on maintenance chemotherapy, H/O PEs on eliquis, pw 1 day of weakness with altered mental status. Per wife, patient seemed confused, not answering questions appropriately today. Weakness has been progressive worse today, associated with decreased food and water intake. + sick contacts at home with the flu. He was recently admitted with pneumonia. He denies any nausea, vomiting, abd pain, diarrhea or chest pain. He was found to have fever of 102F in ED. He received IV vancomycin and azactam in ED.      04/06/19: Patient seen and examined. On CO for agitation and try to pull out abraham. Had 3 episodes of diarrhea last night. C diff pending.       Vital Signs Last 24 Hrs  T(C): 36.7 (06 Apr 2019 13:17), Max: 37.4 (05 Apr 2019 14:57)  T(F): 98 (06 Apr 2019 13:17), Max: 99.3 (05 Apr 2019 14:57)  HR: 78 (06 Apr 2019 13:17) (78 - 96)  BP: 110/62 (06 Apr 2019 13:17) (105/66 - 132/72)  BP(mean): --  RR: 19 (06 Apr 2019 13:17) (18 - 19)  SpO2: 94% (06 Apr 2019 13:17) (90% - 97%)      Physical Exam:     · Appearance: ILL APPEARING, weak  	· Manner: appropriate for situation  	· EYES: Clear bilaterally. Lower eyelid edema  	· CARDIAC RHYTHM: regular, S1, S2  	· CARDIAC MURMUR: no murmur  	· Breath Sounds: DIMINISHED  	· Diminished Breath Sounds: RIGHT LOWER LOBE, LEFT LOWER LOBE  	· Chest Exam: normal  	· GASTROINTESTINAL: Abdomen soft, non-tender, no guarding.  · NEURO LOC: alert  follows commands.            Labs:                                               8.3    4.47  )-----------( 143      ( 06 Apr 2019 05:51 )             24.8     06 Apr 2019 05:51    139    |  107    |  6      ----------------------------<  77     3.6     |  21     |  0.93     Ca    8.3        06 Apr 2019 05:51    TPro  7.3    /  Alb  3.0    /  TBili  0.3    /  DBili  x      /  AST  26     /  ALT  16     /  AlkPhos  107    04 Apr 2019 14:23    LIVER FUNCTIONS - ( 04 Apr 2019 14:23 )  Alb: 3.0 g/dL / Pro: 7.3 gm/dL / ALK PHOS: 107 U/L / ALT: 16 U/L / AST: 26 U/L / GGT: x           PT/INR - ( 04 Apr 2019 14:23 )   PT: 15.8 sec;   INR: 1.41 ratio         PTT - ( 04 Apr 2019 14:23 )  PTT:37.3 sec  CAPILLARY BLOOD GLUCOSE      POCT Blood Glucose.: 103 mg/dL (06 Apr 2019 12:08)  POCT Blood Glucose.: 83 mg/dL (06 Apr 2019 08:09)  POCT Blood Glucose.: 97 mg/dL (05 Apr 2019 21:34)  POCT Blood Glucose.: 73 mg/dL (05 Apr 2019 17:56)    CARDIAC MARKERS ( 04 Apr 2019 14:23 )  <0.015 ng/mL / x     / x     / x     / x                MEDICATIONS:    apixaban 5 milliGRAM(s) Oral every 12 hours  atorvastatin 40 milliGRAM(s) Oral at bedtime  benzonatate 100 milliGRAM(s) Oral three times a day  dextrose 5%. 1000 milliLiter(s) (50 mL/Hr) IV Continuous <Continuous>  dextrose 50% Injectable 12.5 Gram(s) IV Push once  dextrose 50% Injectable 25 Gram(s) IV Push once  dextrose 50% Injectable 25 Gram(s) IV Push once  fentaNYL   Patch  25 MICROgram(s)/Hr 1 Patch Transdermal every 72 hours  insulin lispro (HumaLOG) corrective regimen sliding scale   SubCutaneous three times a day before meals  insulin lispro (HumaLOG) corrective regimen sliding scale   SubCutaneous at bedtime  lamoTRIgine 100 milliGRAM(s) Oral two times a day  montelukast 10 milliGRAM(s) Oral daily  oseltamivir 75 milliGRAM(s) Oral two times a day  sodium chloride 0.9%. 1000 milliLiter(s) (100 mL/Hr) IV Continuous <Continuous>  tamsulosin 0.4 milliGRAM(s) Oral at bedtime  tiotropium 18 MICROgram(s) Capsule 1 Capsule(s) Inhalation daily    MEDICATIONS  (PRN):  acetaminophen   Tablet .. 650 milliGRAM(s) Oral every 6 hours PRN Temp greater or equal to 38C (100.4F), Mild Pain (1 - 3)  dextrose 40% Gel 15 Gram(s) Oral once PRN Blood Glucose LESS THAN 70 milliGRAM(s)/deciliter  glucagon  Injectable 1 milliGRAM(s) IntraMuscular once PRN Glucose LESS THAN 70 milligrams/deciliter  ondansetron Injectable 4 milliGRAM(s) IV Push every 6 hours PRN Nausea            Assessment and Plan:   Assessment:  · Assessment		  64yoM hx of metastatic lung cancer mets to brain and bone, S/P whole brain radiation currently on maintenance chemotherapy, H/O PEs on eliquis, pw 1 day of weakness with altered mental status. Per wife, patient seemed confused, not answering questions appropriately today. Weakness has been progressive worse today, associated with decreased food and water intake. + sick contacts at home with the flu. He was recently admitted with pneumonia. He denies any nausea, vomiting, abd pain, diarrhea or chest pain. He was found to have fever of 102F in ED. He received IV vancomycin and azactam in ED.     1. Acute febrile illness ue to Influenza A3  Immunocompromised  patient  Follow blood cultures: no growth  Continue tamiflu  day#2  Dr Franks consult appreciated.   Agree watching off antibiotic    2. Dehydration  Poor oral intake  Continue IV fluid.    3. H/O Metastatic lung cancer.  Outpatient follow up  last chemo was last Monday.    4. H/o PE  Continue eliquis.    5. DM-2  Stable  On ISS.    6. H/O COPD  Continue albuterol  and spiriva.    7. Urinary retention-  On abraham  Continue flomax and start proscar
64yoM hx of metastatic lung cancer mets to brain and bone, S/P whole brain radiation currently on maintenance chemotherapy, H/O PEs on eliquis, pw 1 day of weakness with altered mental status. Per wife, patient seemed confused, not answering questions appropriately today. Weakness has been progressive worse today, associated with decreased food and water intake. + sick contacts at home with the flu. He was recently admitted with pneumonia. He denies any nausea, vomiting, abd pain, diarrhea or chest pain. He was found to have fever of 102F in ED. He received IV vancomycin and azactam in ED.      Physical Exam:     · Appearance: ILL APPEARING, weak  	· Manner: appropriate for situation  	· EYES: Clear bilaterally. Lower eyelid edema  	· CARDIAC RHYTHM: regular, S1, S2  	· CARDIAC MURMUR: no murmur  	· Breath Sounds: DIMINISHED  	· Diminished Breath Sounds: RIGHT LOWER LOBE, LEFT LOWER LOBE  	· Chest Exam: normal  	· GASTROINTESTINAL: Abdomen soft, non-tender, no guarding.  · NEURO LOC: alert  follows commands.            Labs:                           7.9    5.06  )-----------( 167      ( 2019 07:05 )             23.6     2019 07:05    134    |  103    |  10     ----------------------------<  90     3.5     |  23     |  1.02     Ca    8.0        2019 07:05    TPro  7.3    /  Alb  3.0    /  TBili  0.3    /  DBili  x      /  AST  26     /  ALT  16     /  AlkPhos  107    2019 14:23    LIVER FUNCTIONS - ( 2019 14:23 )  Alb: 3.0 g/dL / Pro: 7.3 gm/dL / ALK PHOS: 107 U/L / ALT: 16 U/L / AST: 26 U/L / GGT: x           PT/INR - ( 2019 14:23 )   PT: 15.8 sec;   INR: 1.41 ratio         PTT - ( 2019 14:23 )  PTT:37.3 sec  CAPILLARY BLOOD GLUCOSE      POCT Blood Glucose.: 106 mg/dL (2019 11:22)  POCT Blood Glucose.: 91 mg/dL (2019 08:06)  POCT Blood Glucose.: 111 mg/dL (2019 23:12)  POCT Blood Glucose.: 110 mg/dL (2019 18:17)    CARDIAC MARKERS ( 2019 14:23 )  <0.015 ng/mL / x     / x     / x     / x          Urinalysis Basic - ( 2019 03:00 )    Color: Yellow / Appearance: Clear / S.020 / pH: x  Gluc: x / Ketone: Negative  / Bili: Negative / Urobili: Negative mg/dL   Blood: x / Protein: 500 mg/dL / Nitrite: Negative   Leuk Esterase: Trace / RBC: 0-2 /HPF / WBC 0-2   Sq Epi: x / Non Sq Epi: Occasional / Bacteria: Occasional          MEDICATIONS:    apixaban 5 milliGRAM(s) Oral every 12 hours  atorvastatin 40 milliGRAM(s) Oral at bedtime  benzonatate 100 milliGRAM(s) Oral three times a day  dextrose 5%. 1000 milliLiter(s) (50 mL/Hr) IV Continuous <Continuous>  dextrose 50% Injectable 12.5 Gram(s) IV Push once  dextrose 50% Injectable 25 Gram(s) IV Push once  dextrose 50% Injectable 25 Gram(s) IV Push once  fentaNYL   Patch  25 MICROgram(s)/Hr 1 Patch Transdermal every 72 hours  insulin lispro (HumaLOG) corrective regimen sliding scale   SubCutaneous three times a day before meals  insulin lispro (HumaLOG) corrective regimen sliding scale   SubCutaneous at bedtime  lamoTRIgine 100 milliGRAM(s) Oral two times a day  montelukast 10 milliGRAM(s) Oral daily  oseltamivir 75 milliGRAM(s) Oral two times a day  sodium chloride 0.9%. 1000 milliLiter(s) (100 mL/Hr) IV Continuous <Continuous>  tamsulosin 0.4 milliGRAM(s) Oral at bedtime  tiotropium 18 MICROgram(s) Capsule 1 Capsule(s) Inhalation daily    MEDICATIONS  (PRN):  acetaminophen   Tablet .. 650 milliGRAM(s) Oral every 6 hours PRN Temp greater or equal to 38C (100.4F), Mild Pain (1 - 3)  dextrose 40% Gel 15 Gram(s) Oral once PRN Blood Glucose LESS THAN 70 milliGRAM(s)/deciliter  glucagon  Injectable 1 milliGRAM(s) IntraMuscular once PRN Glucose LESS THAN 70 milligrams/deciliter  ondansetron Injectable 4 milliGRAM(s) IV Push every 6 hours PRN Nausea            Assessment and Plan:   Assessment:  · Assessment		  64yoM hx of metastatic lung cancer mets to brain and bone, S/P whole brain radiation currently on maintenance chemotherapy, H/O PEs on eliquis, pw 1 day of weakness with altered mental status. Per wife, patient seemed confused, not answering questions appropriately today. Weakness has been progressive worse today, associated with decreased food and water intake. + sick contacts at home with the flu. He was recently admitted with pneumonia. He denies any nausea, vomiting, abd pain, diarrhea or chest pain. He was found to have fever of 102F in ED. He received IV vancomycin and azactam in ED.     1. Acute febrile illness ue to Influenza A3  Immunocompromised  patient  Follow blood cultures  Continue tamiflu started this am  Dr Franks consult apprecaited.  Agree watching off antibiotic    2. Dehydration  Poor oral intake  Continue IV fluid.    3. H/O Metastatic lung cancer.  Outpatient follow up  last chemo was last Monday.    4. H/o PE  Continue eliquis.    5. DM-2  Stable  On ISS.    6. H/O COPD  Continue albuterol  and spiriva
64yoM hx of metastatic lung cancer mets to brain and bone, S/P whole brain radiation currently on maintenance chemotherapy, H/O PEs on eliquis, pw 1 day of weakness with altered mental status. Per wife, patient seemed confused, not answering questions appropriately today. Weakness has been progressive worse today, associated with decreased food and water intake. + sick contacts at home with the flu. He was recently admitted with pneumonia. He denies any nausea, vomiting, abd pain, diarrhea or chest pain. He was found to have fever of 102F in ED. He received IV vancomycin and azactam in ED.      04/06/19: Patient seen and examined. On CO for agitation and try to pull out abraham. Had 3 episodes of diarrhea last night. C diff pending.   04/07/19: Patient seen and examined. Abraham removed, still not voided. Now with C diff. Diarrhea improving. Discussed with wife on phone in length regarding management and d/c plan.       Vital Signs Last 24 Hrs  T(C): 36.5 (07 Apr 2019 13:15), Max: 36.8 (06 Apr 2019 20:35)  T(F): 97.7 (07 Apr 2019 13:15), Max: 98.2 (06 Apr 2019 20:35)  HR: 93 (07 Apr 2019 13:15) (93 - 107)  BP: 111/65 (07 Apr 2019 13:15) (111/65 - 132/77)  BP(mean): --  RR: 17 (07 Apr 2019 13:15) (17 - 18)  SpO2: 100% (07 Apr 2019 13:15) (97% - 100%)      Physical Exam:     · Appearance: ILL APPEARING, weak  	· Manner: appropriate for situation  	· EYES: Clear bilaterally. Lower eyelid edema  	· CARDIAC RHYTHM: regular, S1, S2  	· CARDIAC MURMUR: no murmur  	· Breath Sounds: DIMINISHED  	· Diminished Breath Sounds: RIGHT LOWER LOBE, LEFT LOWER LOBE  	· Chest Exam: normal  	· GASTROINTESTINAL: Abdomen soft, non-tender, no guarding.  · NEURO LOC: alert  follows commands.            Labs:                                               8.3    4.47  )-----------( 143      ( 06 Apr 2019 05:51 )             24.8     06 Apr 2019 05:51    139    |  107    |  6      ----------------------------<  77     3.6     |  21     |  0.93     Ca    8.3        06 Apr 2019 05:51    TPro  7.3    /  Alb  3.0    /  TBili  0.3    /  DBili  x      /  AST  26     /  ALT  16     /  AlkPhos  107    04 Apr 2019 14:23    LIVER FUNCTIONS - ( 04 Apr 2019 14:23 )  Alb: 3.0 g/dL / Pro: 7.3 gm/dL / ALK PHOS: 107 U/L / ALT: 16 U/L / AST: 26 U/L / GGT: x           PT/INR - ( 04 Apr 2019 14:23 )   PT: 15.8 sec;   INR: 1.41 ratio         PTT - ( 04 Apr 2019 14:23 )  PTT:37.3 sec  CAPILLARY BLOOD GLUCOSE      POCT Blood Glucose.: 103 mg/dL (06 Apr 2019 12:08)  POCT Blood Glucose.: 83 mg/dL (06 Apr 2019 08:09)  POCT Blood Glucose.: 97 mg/dL (05 Apr 2019 21:34)  POCT Blood Glucose.: 73 mg/dL (05 Apr 2019 17:56)    CARDIAC MARKERS ( 04 Apr 2019 14:23 )  <0.015 ng/mL / x     / x     / x     / x                MEDICATIONS:          MEDICATIONS  (STANDING):  apixaban 5 milliGRAM(s) Oral every 12 hours  atorvastatin 40 milliGRAM(s) Oral at bedtime  benzonatate 100 milliGRAM(s) Oral three times a day  dextrose 5%. 1000 milliLiter(s) (50 mL/Hr) IV Continuous <Continuous>  dextrose 50% Injectable 12.5 Gram(s) IV Push once  dextrose 50% Injectable 25 Gram(s) IV Push once  dextrose 50% Injectable 25 Gram(s) IV Push once  fentaNYL   Patch  25 MICROgram(s)/Hr 1 Patch Transdermal every 72 hours  finasteride 5 milliGRAM(s) Oral daily  insulin lispro (HumaLOG) corrective regimen sliding scale   SubCutaneous three times a day before meals  insulin lispro (HumaLOG) corrective regimen sliding scale   SubCutaneous at bedtime  lamoTRIgine 100 milliGRAM(s) Oral two times a day  montelukast 10 milliGRAM(s) Oral daily  oseltamivir 75 milliGRAM(s) Oral two times a day  sodium chloride 0.9%. 1000 milliLiter(s) (100 mL/Hr) IV Continuous <Continuous>  tamsulosin 0.4 milliGRAM(s) Oral at bedtime  tiotropium 18 MICROgram(s) Capsule 1 Capsule(s) Inhalation daily  vancomycin    Solution 125 milliGRAM(s) Oral every 6 hours    MEDICATIONS  (PRN):  acetaminophen   Tablet .. 650 milliGRAM(s) Oral every 6 hours PRN Temp greater or equal to 38C (100.4F), Mild Pain (1 - 3)  ALPRAZolam 0.5 milliGRAM(s) Oral every 8 hours PRN anxiety  dextrose 40% Gel 15 Gram(s) Oral once PRN Blood Glucose LESS THAN 70 milliGRAM(s)/deciliter  glucagon  Injectable 1 milliGRAM(s) IntraMuscular once PRN Glucose LESS THAN 70 milligrams/deciliter  haloperidol    Injectable 2 milliGRAM(s) IntraMuscular every 6 hours PRN Agitation  ondansetron Injectable 4 milliGRAM(s) IV Push every 6 hours PRN Nausea              Assessment and Plan:   Assessment:  · Assessment		  64yoM hx of metastatic lung cancer mets to brain and bone, S/P whole brain radiation currently on maintenance chemotherapy, H/O PEs on eliquis, pw 1 day of weakness with altered mental status. Per wife, patient seemed confused, not answering questions appropriately today. Weakness has been progressive worse today, associated with decreased food and water intake. + sick contacts at home with the flu. He was recently admitted with pneumonia. He denies any nausea, vomiting, abd pain, diarrhea or chest pain. He was found to have fever of 102F in ED. He received IV vancomycin and azactam in ED.     1. Acute febrile illness ue to Influenza A3  Immunocompromised  patient  Follow blood cultures: no growth  Continue tamiflu  day#3  Dr Franks consult appreciated.   Agree watching off antibiotic    2. Dehydration  Poor oral intake  D/C IV fluid    3. H/O Metastatic lung cancer.  Outpatient follow up  last chemo was last Monday.    4. H/o PE  Continue eliquis.    5. DM-2  Stable  On ISS.    6. H/O COPD  Continue albuterol  and spiriva.    7. Urinary retention-  Abraham removed, check bladder scan for residual  Continue flomax and start proscar    8. C diff colitis  Contiue po vanco
64yoM hx of metastatic lung cancer mets to brain and bone, S/P whole brain radiation currently on maintenance chemotherapy, H/O PEs on eliquis, pw 1 day of weakness with altered mental status. Per wife, patient seemed confused, not answering questions appropriately today. Weakness has been progressive worse today, associated with decreased food and water intake. + sick contacts at home with the flu. He was recently admitted with pneumonia. He denies any nausea, vomiting, abd pain, diarrhea or chest pain. He was found to have fever of 102F in ED. He received IV vancomycin and azactam in ED.      04/06/19: Patient seen and examined. On CO for agitation and try to pull out abraham. Had 3 episodes of diarrhea last night. C diff pending.   04/07/19: Patient seen and examined. Abraham removed, still not voided. Now with C diff. Diarrhea improving. Discussed with wife on phone in length regarding management and d/c plan.   04/08/19: Patient seen and examined. 3 episodes of diarrhea since this am. Discussed with wife at bed side regarding management and d/c plan.       Vital Signs Last 24 Hrs  T(C): 37.8 (08 Apr 2019 11:56), Max: 37.8 (08 Apr 2019 11:56)  T(F): 100.1 (08 Apr 2019 11:56), Max: 100.1 (08 Apr 2019 11:56)  HR: 94 (08 Apr 2019 11:56) (94 - 104)  BP: 109/67 (08 Apr 2019 11:56) (109/67 - 123/70)  BP(mean): --  RR: 18 (08 Apr 2019 11:56) (18 - 19)  SpO2: 94% (08 Apr 2019 11:56) (94% - 100%)      Physical Exam:     · Appearance: ILL APPEARING, weak  	· Manner: appropriate for situation  	· EYES: Clear bilaterally. Lower eyelid edema  	· CARDIAC RHYTHM: regular, S1, S2  	· CARDIAC MURMUR: no murmur  	· Breath Sounds: DIMINISHED  	· Diminished Breath Sounds: RIGHT LOWER LOBE, LEFT LOWER LOBE  	· Chest Exam: normal  	· GASTROINTESTINAL: Abdomen soft, non-tender, no guarding.  · NEURO LOC: alert  follows commands.            Labs:                     CAPILLARY BLOOD GLUCOSE      POCT Blood Glucose.: 120 mg/dL (08 Apr 2019 12:18)  POCT Blood Glucose.: 107 mg/dL (08 Apr 2019 07:18)  POCT Blood Glucose.: 105 mg/dL (07 Apr 2019 21:53)  POCT Blood Glucose.: 102 mg/dL (07 Apr 2019 17:05)        MEDICATIONS:          MEDICATIONS  (STANDING):  apixaban 5 milliGRAM(s) Oral every 12 hours  atorvastatin 40 milliGRAM(s) Oral at bedtime  benzonatate 100 milliGRAM(s) Oral three times a day  dextrose 5%. 1000 milliLiter(s) (50 mL/Hr) IV Continuous <Continuous>  dextrose 50% Injectable 12.5 Gram(s) IV Push once  dextrose 50% Injectable 25 Gram(s) IV Push once  dextrose 50% Injectable 25 Gram(s) IV Push once  fentaNYL   Patch  25 MICROgram(s)/Hr 1 Patch Transdermal every 72 hours  finasteride 5 milliGRAM(s) Oral daily  insulin lispro (HumaLOG) corrective regimen sliding scale   SubCutaneous three times a day before meals  insulin lispro (HumaLOG) corrective regimen sliding scale   SubCutaneous at bedtime  lamoTRIgine 100 milliGRAM(s) Oral two times a day  montelukast 10 milliGRAM(s) Oral daily  oseltamivir 75 milliGRAM(s) Oral two times a day  sodium chloride 0.9%. 1000 milliLiter(s) (100 mL/Hr) IV Continuous <Continuous>  tamsulosin 0.4 milliGRAM(s) Oral at bedtime  tiotropium 18 MICROgram(s) Capsule 1 Capsule(s) Inhalation daily  vancomycin    Solution 125 milliGRAM(s) Oral every 6 hours    MEDICATIONS  (PRN):  acetaminophen   Tablet .. 650 milliGRAM(s) Oral every 6 hours PRN Temp greater or equal to 38C (100.4F), Mild Pain (1 - 3)  ALPRAZolam 0.5 milliGRAM(s) Oral every 8 hours PRN anxiety  dextrose 40% Gel 15 Gram(s) Oral once PRN Blood Glucose LESS THAN 70 milliGRAM(s)/deciliter  glucagon  Injectable 1 milliGRAM(s) IntraMuscular once PRN Glucose LESS THAN 70 milligrams/deciliter  haloperidol    Injectable 2 milliGRAM(s) IntraMuscular every 6 hours PRN Agitation  ondansetron Injectable 4 milliGRAM(s) IV Push every 6 hours PRN Nausea              Assessment and Plan:   Assessment:  · Assessment		  64yoM hx of metastatic lung cancer mets to brain and bone, S/P whole brain radiation currently on maintenance chemotherapy, H/O PEs on eliquis, pw 1 day of weakness with altered mental status. Per wife, patient seemed confused, not answering questions appropriately today. Weakness has been progressive worse today, associated with decreased food and water intake. + sick contacts at home with the flu. He was recently admitted with pneumonia. He denies any nausea, vomiting, abd pain, diarrhea or chest pain. He was found to have fever of 102F in ED. He received IV vancomycin and azactam in ED.     1. Acute febrile illness ue to Influenza A3  Immunocompromised  patient  Follow blood cultures: no growth  Continue tamiflu  day#4  Dr Franks consult appreciated.     2. Dehydration  Poor oral intake  Diarrhea  IV fluid    3. H/O Metastatic lung cancer.  Outpatient follow up  last chemo was last Monday.    4. H/o PE  Continue eliquis.    5. DM-2  Stable  On ISS.    6. H/O COPD  Continue albuterol  and spiriva.    7. Urinary retention-  Abraham removed yesterday, voiding  Continue flomax and start proscar    8. C diff colitis  Continue po vanco    Disposition-possibly d/c home tomorrow if stable
HOSPITALIST ATTENDING PROGRESS NOTE    Chart and meds reviewed.  Patient seen and examined.    HPI: 64yoM hx of metastatic lung cancer mets to brain and bone, S/P whole brain radiation currently on maintenance chemotherapy, H/O PEs on eliquis, pw 1 day of weakness with altered mental status. Per wife, patient seemed confused, not answering questions appropriately today. Weakness has been progressive worse today, associated with decreased food and water intake. + sick contacts at home with the flu. He was recently admitted with pneumonia. He denies any nausea, vomiting, abd pain, diarrhea or chest pain. He was found to have fever of 102F in ED. He received IV vancomycin and azactam in ED.     04/09/19: pt on CO for agitation, improving mentation, still congested on exam, d/w wife. Oncologist is at [place], neutropenic    4/10/19: pt off CO, more oriented, completed tamiflu, no distress, breathing better, cough improving, neutropenic precaution, hemeonc eval pending. discussed with wife that patient is pancytopenic, will need 1 unit PRBC today and possibly neupogen.    4/11/19: Pt with thrombocytopenia, WBC normalizing with neupogen, cdiff (diarrhea x 5) last night, PT evaluation.     4/12/19 Pt with thrombocytopenia, d/w , to transfuse platelets today, d/w wife in detail. wife prefers [facility] at this time given the weakness, had 3 stools overtime and 2 in AM.     4/13/19 pt seen and examined, feels weak, was oriented to place/self/time after asking him questions. took long to answer. According to wife and 1:1, he does not eat much. He continues to have diarrhea.     All 10 systems reviewed and found to be negative with the exception of what has been described above.    MEDICATIONS  (STANDING):  ALBUTerol/ipratropium for Nebulization 3 milliLiter(s) Nebulizer every 6 hours  atorvastatin 40 milliGRAM(s) Oral at bedtime  benzonatate 100 milliGRAM(s) Oral three times a day  dextrose 5%. 1000 milliLiter(s) (50 mL/Hr) IV Continuous <Continuous>  dextrose 50% Injectable 12.5 Gram(s) IV Push once  dextrose 50% Injectable 25 Gram(s) IV Push once  dextrose 50% Injectable 25 Gram(s) IV Push once  finasteride 5 milliGRAM(s) Oral daily  folic acid 1 milliGRAM(s) Oral daily  guaiFENesin ER 1200 milliGRAM(s) Oral every 12 hours  insulin lispro (HumaLOG) corrective regimen sliding scale   SubCutaneous three times a day before meals  insulin lispro (HumaLOG) corrective regimen sliding scale   SubCutaneous at bedtime  montelukast 10 milliGRAM(s) Oral daily  potassium chloride    Tablet ER 40 milliEquivalent(s) Oral every 4 hours  sodium chloride 0.65% Nasal 1 Spray(s) Both Nostrils every 8 hours  sodium chloride 0.9%. 1000 milliLiter(s) (80 mL/Hr) IV Continuous <Continuous>  tamsulosin 0.4 milliGRAM(s) Oral at bedtime  tiotropium 18 MICROgram(s) Capsule 1 Capsule(s) Inhalation daily  vancomycin    Solution 125 milliGRAM(s) Oral every 6 hours    MEDICATIONS  (PRN):  acetaminophen   Tablet .. 650 milliGRAM(s) Oral every 6 hours PRN Temp greater or equal to 38C (100.4F), Mild Pain (1 - 3)  dextrose 40% Gel 15 Gram(s) Oral once PRN Blood Glucose LESS THAN 70 milliGRAM(s)/deciliter  diphenhydrAMINE 25 milliGRAM(s) Oral every 6 hours PRN Rash and/or Itching  glucagon  Injectable 1 milliGRAM(s) IntraMuscular once PRN Glucose LESS THAN 70 milligrams/deciliter  haloperidol    Injectable 2 milliGRAM(s) IntraMuscular every 6 hours PRN Agitation  ondansetron Injectable 4 milliGRAM(s) IV Push every 6 hours PRN Nausea      VITALS:  T(F): 97.7 (04-13-19 @ 13:14), Max: 98.5 (04-12-19 @ 17:10)  HR: 103 (04-13-19 @ 13:14) (102 - 112)  BP: 139/91 (04-13-19 @ 13:14) (112/77 - 148/87)  RR: 19 (04-13-19 @ 13:14) (17 - 19)  SpO2: 99% (04-13-19 @ 13:14) (95% - 100%)  Wt(kg): --    I&O's Summary      CAPILLARY BLOOD GLUCOSE      POCT Blood Glucose.: 96 mg/dL (13 Apr 2019 12:09)  POCT Blood Glucose.: 112 mg/dL (13 Apr 2019 07:50)  POCT Blood Glucose.: 105 mg/dL (12 Apr 2019 21:07)  POCT Blood Glucose.: 156 mg/dL (12 Apr 2019 16:45)      PHYSICAL EXAM:    HEENT:  pupils equal and reactive, EOMI, no oropharyngeal lesions, erythema, exudates, oral thrush  NECK:   supple, no carotid bruits, no palpable lymph nodes, no thyromegaly  CV:  +S1, +S2, regular, no murmurs or rubs  RESP:   lungs clear to auscultation bilaterally, no wheezing, rales, rhonchi, good air entry bilaterally  BREAST:  not examined  GI:  abdomen soft, non-tender, non-distended, normal BS, no bruits, no abdominal masses, no palpable masses  RECTAL:  not examined  :  not examined  MSK:   normal muscle tone, no atrophy, no rigidity, no contractions  EXT:  no clubbing, no cyanosis, no edema, no calf pain, swelling or erythema  VASCULAR:  pulses equal and symmetric in the upper and lower extremities  NEURO:  AAOX3, no focal neurological deficits, follows all commands, able to move extremities spontaneously  SKIN:  no ulcers, lesions or rashes    LABS:                            8.0    14.21 )-----------( 57       ( 13 Apr 2019 05:50 )             23.2     04-13    143  |  111<H>  |  4<L>  ----------------------------<  103<H>  3.1<L>   |  23  |  0.83    Ca    8.2<L>      13 Apr 2019 05:50                  Lactate, Blood: 1.2 mmol/L (04-13 @ 10:22)                              CULTURES:
HOSPITALIST ATTENDING PROGRESS NOTE    Chart and meds reviewed.  Patient seen and examined.    HPI: 64yoM hx of metastatic lung cancer mets to brain and bone, S/P whole brain radiation currently on maintenance chemotherapy, H/O PEs on eliquis, pw 1 day of weakness with altered mental status. Per wife, patient seemed confused, not answering questions appropriately today. Weakness has been progressive worse today, associated with decreased food and water intake. + sick contacts at home with the flu. He was recently admitted with pneumonia. He denies any nausea, vomiting, abd pain, diarrhea or chest pain. He was found to have fever of 102F in ED. He received IV vancomycin and azactam in ED.     04/09/19: pt on CO for agitation, improving mentation, still congested on exam, d/w wife. Oncologist is at [place], [place]    4/10/19: pt off CO, more oriented, completed tamiflu, no distress, breathing better, cough improving, neutropenic precaution, hemeonc eval pending. discussed with wife that patient is pancytopenic, will need 1 unit PRBC today and possibly neupogen.    4/11/19: Pt with thrombocytopenia, WBC normalizing with neupogen, cdiff (diarrhea x 5) last night, PT evaluation.     All 10 systems reviewed and found to be negative with the exception of what has been described above.    MEDICATIONS  (STANDING):  atorvastatin 40 milliGRAM(s) Oral at bedtime  benzonatate 100 milliGRAM(s) Oral three times a day  dextrose 5%. 1000 milliLiter(s) (50 mL/Hr) IV Continuous <Continuous>  dextrose 50% Injectable 12.5 Gram(s) IV Push once  dextrose 50% Injectable 25 Gram(s) IV Push once  dextrose 50% Injectable 25 Gram(s) IV Push once  [id number]-sndz Injectable 300 MICROGram(s) SubCutaneous daily  finasteride 5 milliGRAM(s) Oral daily  guaiFENesin ER 1200 milliGRAM(s) Oral every 12 hours  insulin lispro (HumaLOG) corrective regimen sliding scale   SubCutaneous three times a day before meals  insulin lispro (HumaLOG) corrective regimen sliding scale   SubCutaneous at bedtime  lamoTRIgine 100 milliGRAM(s) Oral two times a day  montelukast 10 milliGRAM(s) Oral daily  sodium chloride 0.65% Nasal 1 Spray(s) Both Nostrils every 8 hours  sodium chloride 0.9%. 1000 milliLiter(s) (70 mL/Hr) IV Continuous <Continuous>  tamsulosin 0.4 milliGRAM(s) Oral at bedtime  tiotropium 18 MICROgram(s) Capsule 1 Capsule(s) Inhalation daily  vancomycin    Solution 125 milliGRAM(s) Oral every 6 hours    MEDICATIONS  (PRN):  acetaminophen   Tablet .. 650 milliGRAM(s) Oral every 6 hours PRN Temp greater or equal to 38C (100.4F), Mild Pain (1 - 3)  ALPRAZolam 0.5 milliGRAM(s) Oral every 8 hours PRN anxiety  dextrose 40% Gel 15 Gram(s) Oral once PRN Blood Glucose LESS THAN 70 milliGRAM(s)/deciliter  diphenhydrAMINE 25 milliGRAM(s) Oral every 6 hours PRN Rash and/or Itching  glucagon  Injectable 1 milliGRAM(s) IntraMuscular once PRN Glucose LESS THAN 70 milligrams/deciliter  haloperidol    Injectable 2 milliGRAM(s) IntraMuscular every 6 hours PRN Agitation  ondansetron Injectable 4 milliGRAM(s) IV Push every 6 hours PRN Nausea      VITALS:  T(F): 97.7 (04-11-19 @ 11:25), Max: 99 (04-11-19 @ 08:10)  HR: 104 (04-11-19 @ 11:25) (92 - 104)  BP: 153/90 (04-11-19 @ 11:25) (133/83 - 153/90)  RR: 18 (04-11-19 @ 11:25) (16 - 18)  SpO2: 100% (04-11-19 @ 11:25) (95% - 100%)  Wt(kg): --    I&O's Summary    10 Apr 2019 07:01  -  11 Apr 2019 07:00  --------------------------------------------------------  IN: 1000 mL / OUT: 0 mL / NET: 1000 mL        CAPILLARY BLOOD GLUCOSE      POCT Blood Glucose.: 90 mg/dL (11 Apr 2019 11:41)  POCT Blood Glucose.: 94 mg/dL (11 Apr 2019 07:59)  POCT Blood Glucose.: 150 mg/dL (10 Apr 2019 22:26)  POCT Blood Glucose.: 104 mg/dL (10 Apr 2019 17:42)      PHYSICAL EXAM:    HEENT:  pupils equal and reactive, EOMI, no oropharyngeal lesions, erythema, exudates, oral thrush  NECK:   supple, no carotid bruits, no palpable lymph nodes, no thyromegaly  CV:  +S1, +S2, regular, no murmurs or rubs  RESP:   lungs clear to auscultation bilaterally, no wheezing, rales, rhonchi, good air entry bilaterally  BREAST:  not examined  GI:  abdomen soft, non-tender, non-distended, normal BS, no bruits, no abdominal masses, no palpable masses  RECTAL:  not examined  :  not examined  MSK:   normal muscle tone, no atrophy, no rigidity, no contractions  EXT:  no clubbing, no cyanosis, no edema, no calf pain, swelling or erythema  VASCULAR:  pulses equal and symmetric in the upper and lower extremities  NEURO:  AAOX3, no focal neurological deficits, follows all commands, able to move extremities spontaneously  SKIN:  no ulcers, lesions or rashes    LABS:                            8.0    4.90  )-----------( 26       ( 11 Apr 2019 05:39 )             23.1     04-11    143  |  113<H>  |  4<L>  ----------------------------<  89  3.5   |  24  |  0.88    Ca    7.9<L>      11 Apr 2019 05:39  Mg     1.6     04-10    TPro  5.0<L>  /  Alb  1.8<L>  /  TBili  0.6  /  DBili  x   /  AST  35  /  ALT  13  /  AlkPhos  91  04-11        LIVER FUNCTIONS - ( 11 Apr 2019 05:39 )  Alb: 1.8 g/dL / Pro: 5.0 gm/dL / ALK PHOS: 91 U/L / ALT: 13 U/L / AST: 35 U/L / GGT: x                                             CULTURES:
HOSPITALIST ATTENDING PROGRESS NOTE    Chart and meds reviewed.  Patient seen and examined.    HPI: 64yoM hx of metastatic lung cancer mets to brain and bone, S/P whole brain radiation currently on maintenance chemotherapy, H/O PEs on eliquis, pw 1 day of weakness with altered mental status. Per wife, patient seemed confused, not answering questions appropriately today. Weakness has been progressive worse today, associated with decreased food and water intake. + sick contacts at home with the flu. He was recently admitted with pneumonia. He denies any nausea, vomiting, abd pain, diarrhea or chest pain. He was found to have fever of 102F in ED. He received IV vancomycin and azactam in ED.     04/09/19: pt on CO for agitation, improving mentation, still congested on exam, d/w wife. Oncologist is at [place], neutropenic    4/10/19: pt off CO, more oriented, completed tamiflu, no distress, breathing better, cough improving, neutropenic precaution, hemeonc eval pending. discussed with wife that patient is pancytopenic, will need 1 unit PRBC today and possibly neupogen.    4/11/19: Pt with thrombocytopenia, WBC normalizing with neupogen, cdiff (diarrhea x 5) last night, PT evaluation.     4/12/19 Pt with thrombocytopenia, d/w , to transfuse platelets today, d/w wife in detail. wife prefers [facility] at this time given the weakness, had 3 stools overtime and 2 in AM.     4/13/19 pt seen and examined, feels weak, was oriented to place/self/time after asking him questions. took long to answer. According to wife and 1:1, he does not eat much. He continues to have diarrhea.     4/14/19 pt seen and examined, much more alert and awake today. oriented to place/self and time.     All 10 systems reviewed and found to be negative with the exception of what has been described above.    MEDICATIONS  (STANDING):  ALBUTerol/ipratropium for Nebulization 3 milliLiter(s) Nebulizer every 6 hours  atorvastatin 40 milliGRAM(s) Oral at bedtime  benzonatate 100 milliGRAM(s) Oral three times a day  dextrose 5%. 1000 milliLiter(s) (50 mL/Hr) IV Continuous <Continuous>  dextrose 50% Injectable 12.5 Gram(s) IV Push once  dextrose 50% Injectable 25 Gram(s) IV Push once  dextrose 50% Injectable 25 Gram(s) IV Push once  fentaNYL   Patch  25 MICROgram(s)/Hr 1 Patch Transdermal every 72 hours  finasteride 5 milliGRAM(s) Oral daily  folic acid 1 milliGRAM(s) Oral daily  guaiFENesin ER 1200 milliGRAM(s) Oral every 12 hours  insulin lispro (HumaLOG) corrective regimen sliding scale   SubCutaneous three times a day before meals  insulin lispro (HumaLOG) corrective regimen sliding scale   SubCutaneous at bedtime  montelukast 10 milliGRAM(s) Oral daily  predniSONE   Tablet 10 milliGRAM(s) Oral daily  sodium chloride 0.9%. 1000 milliLiter(s) (80 mL/Hr) IV Continuous <Continuous>  tamsulosin 0.4 milliGRAM(s) Oral at bedtime  tiotropium 18 MICROgram(s) Capsule 1 Capsule(s) Inhalation daily  trimethoprim/polymyxin Solution 1 Drop(s) Both EYES every 4 hours  vancomycin    Solution 500 milliGRAM(s) Oral every 6 hours    MEDICATIONS  (PRN):  acetaminophen   Tablet .. 650 milliGRAM(s) Oral every 6 hours PRN Temp greater or equal to 38C (100.4F), Mild Pain (1 - 3)  dextrose 40% Gel 15 Gram(s) Oral once PRN Blood Glucose LESS THAN 70 milliGRAM(s)/deciliter  diphenhydrAMINE 25 milliGRAM(s) Oral every 6 hours PRN Rash and/or Itching  glucagon  Injectable 1 milliGRAM(s) IntraMuscular once PRN Glucose LESS THAN 70 milligrams/deciliter  haloperidol    Injectable 2 milliGRAM(s) IntraMuscular every 6 hours PRN Agitation  ondansetron Injectable 4 milliGRAM(s) IV Push every 6 hours PRN Nausea      VITALS:  T(F): 97.8 (04-14-19 @ 11:57), Max: 97.9 (04-13-19 @ 20:29)  HR: 101 (04-14-19 @ 11:57) (88 - 104)  BP: 119/88 (04-14-19 @ 11:57) (119/88 - 139/91)  RR: 16 (04-14-19 @ 11:57) (16 - 19)  SpO2: 100% (04-14-19 @ 11:57) (91% - 100%)  Wt(kg): --    I&O's Summary      CAPILLARY BLOOD GLUCOSE      POCT Blood Glucose.: 182 mg/dL (14 Apr 2019 12:00)  POCT Blood Glucose.: 125 mg/dL (14 Apr 2019 08:03)  POCT Blood Glucose.: 171 mg/dL (13 Apr 2019 22:00)  POCT Blood Glucose.: 116 mg/dL (13 Apr 2019 17:12)      PHYSICAL EXAM:    HEENT:  pupils equal and reactive, EOMI, no oropharyngeal lesions, erythema, exudates, oral thrush  NECK:   supple, no carotid bruits, no palpable lymph nodes, no thyromegaly  CV:  +S1, +S2, regular, no murmurs or rubs  RESP:   lungs clear to auscultation bilaterally, no wheezing, rales, rhonchi, good air entry bilaterally  BREAST:  not examined  GI:  abdomen soft, non-tender, non-distended, normal BS, no bruits, no abdominal masses, no palpable masses  RECTAL:  not examined  :  not examined  MSK:   normal muscle tone, no atrophy, no rigidity, no contractions  EXT:  no clubbing, no cyanosis, no edema, no calf pain, swelling or erythema  VASCULAR:  pulses equal and symmetric in the upper and lower extremities  NEURO:  AAOX3, no focal neurological deficits, follows all commands, able to move extremities spontaneously  SKIN:  no ulcers, lesions or rashes    LABS:                            8.5    10.80 )-----------( 53       ( 14 Apr 2019 08:10 )             24.4     04-14    142  |  112<H>  |  6<L>  ----------------------------<  120<H>  3.8   |  22  |  0.93    Ca    8.2<L>      14 Apr 2019 08:10                                              CULTURES:
HOSPITALIST ATTENDING PROGRESS NOTE    Chart and meds reviewed.  Patient seen and examined.    HPI: 64yoM hx of metastatic lung cancer mets to brain and bone, S/P whole brain radiation currently on maintenance chemotherapy, H/O PEs on eliquis, pw 1 day of weakness with altered mental status. Per wife, patient seemed confused, not answering questions appropriately today. Weakness has been progressive worse today, associated with decreased food and water intake. + sick contacts at home with the flu. He was recently admitted with pneumonia. He denies any nausea, vomiting, abd pain, diarrhea or chest pain. He was found to have fever of 102F in ED. He received IV vancomycin and azactam in ED.     04/09/19: pt on CO for agitation, improving mentation, still congested on exam, d/w wife. Oncologist is at [place], neutropenic    4/10/19: pt off CO, more oriented, completed tamiflu, no distress, breathing better, cough improving, neutropenic precaution, hemeonc eval pending. discussed with wife that patient is pancytopenic, will need 1 unit PRBC today and possibly neupogen.    4/11/19: Pt with thrombocytopenia, WBC normalizing with neupogen, cdiff (diarrhea x 5) last night, PT evaluation.     4/12/19 Pt with thrombocytopenia, d/w , to transfuse platelets today, d/w wife in detail. wife prefers [facility] at this time given the weakness, had 3 stools overtime and 2 in AM.     All 10 systems reviewed and found to be negative with the exception of what has been described above.    MEDICATIONS  (STANDING):  atorvastatin 40 milliGRAM(s) Oral at bedtime  benzonatate 100 milliGRAM(s) Oral three times a day  dextrose 5%. 1000 milliLiter(s) (50 mL/Hr) IV Continuous <Continuous>  dextrose 50% Injectable 12.5 Gram(s) IV Push once  dextrose 50% Injectable 25 Gram(s) IV Push once  dextrose 50% Injectable 25 Gram(s) IV Push once  finasteride 5 milliGRAM(s) Oral daily  guaiFENesin ER 1200 milliGRAM(s) Oral every 12 hours  insulin lispro (HumaLOG) corrective regimen sliding scale   SubCutaneous three times a day before meals  insulin lispro (HumaLOG) corrective regimen sliding scale   SubCutaneous at bedtime  lamoTRIgine 100 milliGRAM(s) Oral two times a day  montelukast 10 milliGRAM(s) Oral daily  sodium chloride 0.65% Nasal 1 Spray(s) Both Nostrils every 8 hours  tamsulosin 0.4 milliGRAM(s) Oral at bedtime  tiotropium 18 MICROgram(s) Capsule 1 Capsule(s) Inhalation daily  vancomycin    Solution 125 milliGRAM(s) Oral every 6 hours    MEDICATIONS  (PRN):  acetaminophen   Tablet .. 650 milliGRAM(s) Oral every 6 hours PRN Temp greater or equal to 38C (100.4F), Mild Pain (1 - 3)  ALPRAZolam 0.5 milliGRAM(s) Oral every 8 hours PRN anxiety  dextrose 40% Gel 15 Gram(s) Oral once PRN Blood Glucose LESS THAN 70 milliGRAM(s)/deciliter  diphenhydrAMINE 25 milliGRAM(s) Oral every 6 hours PRN Rash and/or Itching  glucagon  Injectable 1 milliGRAM(s) IntraMuscular once PRN Glucose LESS THAN 70 milligrams/deciliter  haloperidol    Injectable 2 milliGRAM(s) IntraMuscular every 6 hours PRN Agitation  ondansetron Injectable 4 milliGRAM(s) IV Push every 6 hours PRN Nausea      VITALS:  T(F): 96.9 (04-12-19 @ 11:00), Max: 98.8 (04-11-19 @ 20:06)  HR: 108 (04-12-19 @ 11:00) (76 - 118)  BP: 144/89 (04-12-19 @ 11:00) (144/89 - 151/92)  RR: 18 (04-12-19 @ 11:00) (17 - 18)  SpO2: 100% (04-12-19 @ 11:00) (95% - 100%)  Wt(kg): --    I&O's Summary    11 Apr 2019 07:01  -  12 Apr 2019 07:00  --------------------------------------------------------  IN: 0 mL / OUT: 1700 mL / NET: -1700 mL        CAPILLARY BLOOD GLUCOSE      POCT Blood Glucose.: 106 mg/dL (12 Apr 2019 11:30)  POCT Blood Glucose.: 115 mg/dL (12 Apr 2019 07:48)  POCT Blood Glucose.: 114 mg/dL (11 Apr 2019 22:01)  POCT Blood Glucose.: 104 mg/dL (11 Apr 2019 17:10)      PHYSICAL EXAM:    HEENT:  pupils equal and reactive, EOMI, no oropharyngeal lesions, erythema, exudates, oral thrush  NECK:   supple, no carotid bruits, no palpable lymph nodes, no thyromegaly  CV:  +S1, +S2, regular, no murmurs or rubs  RESP:   lungs clear to auscultation bilaterally, no wheezing, rales, rhonchi, good air entry bilaterally  BREAST:  not examined  GI:  abdomen soft, non-tender, non-distended, normal BS, no bruits, no abdominal masses, no palpable masses  RECTAL:  not examined  :  not examined  MSK:   normal muscle tone, no atrophy, no rigidity, no contractions  EXT:  no clubbing, no cyanosis, no edema, no calf pain, swelling or erythema  VASCULAR:  pulses equal and symmetric in the upper and lower extremities  NEURO:  AAOX3, no focal neurological deficits, follows all commands, able to move extremities spontaneously  SKIN:  no ulcers, lesions or rashes    LABS:                            8.9    14.55 )-----------( 19       ( 12 Apr 2019 05:48 )             25.8     04-11    143  |  113<H>  |  4<L>  ----------------------------<  89  3.5   |  24  |  0.88    Ca    7.9<L>      11 Apr 2019 05:39  Mg     1.6     04-10    TPro  5.0<L>  /  Alb  1.8<L>  /  TBili  0.6  /  DBili  x   /  AST  35  /  ALT  13  /  AlkPhos  91  04-11        LIVER FUNCTIONS - ( 11 Apr 2019 05:39 )  Alb: 1.8 g/dL / Pro: 5.0 gm/dL / ALK PHOS: 91 U/L / ALT: 13 U/L / AST: 35 U/L / GGT: x                                             CULTURES:
HOSPITALIST ATTENDING PROGRESS NOTE    Chart and meds reviewed.  Patient seen and examined.    HPI: 64yoM hx of metastatic lung cancer mets to brain and bone, S/P whole brain radiation currently on maintenance chemotherapy, H/O PEs on eliquis, pw 1 day of weakness with altered mental status. Per wife, patient seemed confused, not answering questions appropriately today. Weakness has been progressive worse today, associated with decreased food and water intake. + sick contacts at home with the flu. He was recently admitted with pneumonia. He denies any nausea, vomiting, abd pain, diarrhea or chest pain. He was found to have fever of 102F in ED. He received IV vancomycin and azactam in ED.     04/09/19: pt on CO for agitation, improving mentation, still congested on exam, d/w wife. Oncologist is at [place], neutropenic    4/19/19: pt off CO, more oriented, completed tamiflu, no distress, breathing better, cough improving, neutropenic precaution, hemeonc eval pending. discussed with wife that patient is pancytopenic, will need 1 unit PRBC today and possibly neupogen.    All 10 systems reviewed and found to be negative with the exception of what has been described above.    MEDICATIONS  (STANDING):  ALBUTerol/ipratropium for Nebulization 3 milliLiter(s) Nebulizer every 6 hours  apixaban 5 milliGRAM(s) Oral every 12 hours  atorvastatin 40 milliGRAM(s) Oral at bedtime  benzonatate 100 milliGRAM(s) Oral three times a day  dextrose 5%. 1000 milliLiter(s) (50 mL/Hr) IV Continuous <Continuous>  dextrose 50% Injectable 12.5 Gram(s) IV Push once  dextrose 50% Injectable 25 Gram(s) IV Push once  dextrose 50% Injectable 25 Gram(s) IV Push once  fentaNYL   Patch  25 MICROgram(s)/Hr 1 Patch Transdermal every 72 hours  finasteride 5 milliGRAM(s) Oral daily  guaiFENesin ER 1200 milliGRAM(s) Oral every 12 hours  insulin lispro (HumaLOG) corrective regimen sliding scale   SubCutaneous three times a day before meals  insulin lispro (HumaLOG) corrective regimen sliding scale   SubCutaneous at bedtime  lamoTRIgine 100 milliGRAM(s) Oral two times a day  montelukast 10 milliGRAM(s) Oral daily  potassium chloride    Tablet ER 40 milliEquivalent(s) Oral every 4 hours  sodium chloride 0.9%. 1000 milliLiter(s) (70 mL/Hr) IV Continuous <Continuous>  tamsulosin 0.4 milliGRAM(s) Oral at bedtime  tiotropium 18 MICROgram(s) Capsule 1 Capsule(s) Inhalation daily  vancomycin    Solution 125 milliGRAM(s) Oral every 6 hours    MEDICATIONS  (PRN):  acetaminophen   Tablet .. 650 milliGRAM(s) Oral every 6 hours PRN Temp greater or equal to 38C (100.4F), Mild Pain (1 - 3)  ALPRAZolam 0.5 milliGRAM(s) Oral every 8 hours PRN anxiety  dextrose 40% Gel 15 Gram(s) Oral once PRN Blood Glucose LESS THAN 70 milliGRAM(s)/deciliter  diphenhydrAMINE 25 milliGRAM(s) Oral every 6 hours PRN Rash and/or Itching  glucagon  Injectable 1 milliGRAM(s) IntraMuscular once PRN Glucose LESS THAN 70 milligrams/deciliter  haloperidol    Injectable 2 milliGRAM(s) IntraMuscular every 6 hours PRN Agitation  ondansetron Injectable 4 milliGRAM(s) IV Push every 6 hours PRN Nausea      VITALS:  T(F): 97.7 (04-10-19 @ 07:22), Max: 98 (04-10-19 @ 00:25)  HR: 109 (04-10-19 @ 07:22) (95 - 109)  BP: 136/81 (04-10-19 @ 07:22) (124/76 - 142/81)  RR: 18 (04-10-19 @ 07:22) (18 - 20)  SpO2: 93% (04-10-19 @ 07:22) (92% - 97%)  Wt(kg): --    I&O's Summary    09 Apr 2019 07:01  -  10 Apr 2019 07:00  --------------------------------------------------------  IN: 990 mL / OUT: 100 mL / NET: 890 mL        CAPILLARY BLOOD GLUCOSE      POCT Blood Glucose.: 122 mg/dL (10 Apr 2019 08:44)  POCT Blood Glucose.: 168 mg/dL (09 Apr 2019 21:43)  POCT Blood Glucose.: 129 mg/dL (09 Apr 2019 17:22)  POCT Blood Glucose.: 103 mg/dL (09 Apr 2019 13:30)      PHYSICAL EXAM:    HEENT:  pupils equal and reactive, EOMI, no oropharyngeal lesions, erythema, exudates, oral thrush  NECK:   supple, no carotid bruits, no palpable lymph nodes, no thyromegaly  CV:  +S1, +S2, regular, no murmurs or rubs  RESP:   lungs clear to auscultation bilaterally, no wheezing, rales, rhonchi, good air entry bilaterally  BREAST:  not examined  GI:  abdomen soft, non-tender, non-distended, normal BS, no bruits, no abdominal masses, no palpable masses  RECTAL:  not examined  :  not examined  MSK:   normal muscle tone, no atrophy, no rigidity, no contractions  EXT:  no clubbing, no cyanosis, no edema, no calf pain, swelling or erythema  VASCULAR:  pulses equal and symmetric in the upper and lower extremities  NEURO:  AAOX3, no focal neurological deficits, follows all commands, able to move extremities spontaneously  SKIN:  no ulcers, lesions or rashes    LABS:                            6.8    1.64  )-----------( 43       ( 10 Apr 2019 05:53 )             20.7     04-10    138  |  111<H>  |  4<L>  ----------------------------<  115<H>  2.9<LL>   |  18<L>  |  0.84    Ca    8.1<L>      10 Apr 2019 05:53  Mg     1.3     04-09                                              CULTURES:
HOSPITALIST ATTENDING PROGRESS NOTE    Chart and meds reviewed.  Patient seen and examined.    HPI: 64yoM hx of metastatic lung cancer mets to brain and bone, S/P whole brain radiation currently on maintenance chemotherapy, H/O PEs on eliquis, pw 1 day of weakness with altered mental status. Per wife, patient seemed confused, not answering questions appropriately today. Weakness has been progressive worse today, associated with decreased food and water intake. + sick contacts at home with the flu. He was recently admitted with pneumonia. He denies any nausea, vomiting, abd pain, diarrhea or chest pain. He was found to have fever of 102F in ED. He received IV vancomycin and azactam in ED.     04/09/19: pt on CO for agitation, improving mentation, still congested on exam, d/w wife. [facility] [place]      All 10 systems reviewed and found to be negative with the exception of what has been described above.    MEDICATIONS  (STANDING):  ALBUTerol/ipratropium for Nebulization 3 milliLiter(s) Nebulizer every 6 hours  apixaban 5 milliGRAM(s) Oral every 12 hours  atorvastatin 40 milliGRAM(s) Oral at bedtime  benzonatate 100 milliGRAM(s) Oral three times a day  dextrose 5%. 1000 milliLiter(s) (50 mL/Hr) IV Continuous <Continuous>  dextrose 50% Injectable 12.5 Gram(s) IV Push once  dextrose 50% Injectable 25 Gram(s) IV Push once  dextrose 50% Injectable 25 Gram(s) IV Push once  fentaNYL   Patch  25 MICROgram(s)/Hr 1 Patch Transdermal every 72 hours  finasteride 5 milliGRAM(s) Oral daily  guaiFENesin ER 1200 milliGRAM(s) Oral every 12 hours  insulin lispro (HumaLOG) corrective regimen sliding scale   SubCutaneous three times a day before meals  insulin lispro (HumaLOG) corrective regimen sliding scale   SubCutaneous at bedtime  lamoTRIgine 100 milliGRAM(s) Oral two times a day  magnesium sulfate  IVPB 2 Gram(s) IV Intermittent once  montelukast 10 milliGRAM(s) Oral daily  oseltamivir 75 milliGRAM(s) Oral two times a day  sodium chloride 0.9%. 1000 milliLiter(s) (70 mL/Hr) IV Continuous <Continuous>  tamsulosin 0.4 milliGRAM(s) Oral at bedtime  tiotropium 18 MICROgram(s) Capsule 1 Capsule(s) Inhalation daily  vancomycin    Solution 125 milliGRAM(s) Oral every 6 hours    MEDICATIONS  (PRN):  acetaminophen   Tablet .. 650 milliGRAM(s) Oral every 6 hours PRN Temp greater or equal to 38C (100.4F), Mild Pain (1 - 3)  ALPRAZolam 0.5 milliGRAM(s) Oral every 8 hours PRN anxiety  dextrose 40% Gel 15 Gram(s) Oral once PRN Blood Glucose LESS THAN 70 milliGRAM(s)/deciliter  diphenhydrAMINE 25 milliGRAM(s) Oral every 6 hours PRN Rash and/or Itching  glucagon  Injectable 1 milliGRAM(s) IntraMuscular once PRN Glucose LESS THAN 70 milligrams/deciliter  haloperidol    Injectable 2 milliGRAM(s) IntraMuscular every 6 hours PRN Agitation  ondansetron Injectable 4 milliGRAM(s) IV Push every 6 hours PRN Nausea      VITALS:  T(F): 97.7 (04-09-19 @ 14:02), Max: 98.9 (04-09-19 @ 07:08)  HR: 103 (04-09-19 @ 14:02) (10 - 108)  BP: 142/81 (04-09-19 @ 14:02) (118/68 - 145/87)  RR: 18 (04-09-19 @ 14:02) (17 - 18)  SpO2: 97% (04-09-19 @ 14:02) (93% - 100%)  Wt(kg): --    I&O's Summary    08 Apr 2019 07:01  -  09 Apr 2019 07:00  --------------------------------------------------------  IN: 1350 mL / OUT: 0 mL / NET: 1350 mL        CAPILLARY BLOOD GLUCOSE      POCT Blood Glucose.: 103 mg/dL (09 Apr 2019 13:30)  POCT Blood Glucose.: 123 mg/dL (08 Apr 2019 22:52)  POCT Blood Glucose.: 112 mg/dL (08 Apr 2019 16:51)      PHYSICAL EXAM:    HEENT:  pupils equal and reactive, EOMI, no oropharyngeal lesions, erythema, exudates, oral thrush  NECK:   supple, no carotid bruits, no palpable lymph nodes, no thyromegaly  CV:  +S1, +S2, regular, no murmurs or rubs  RESP:   lungs clear to auscultation bilaterally, no wheezing, rales, rhonchi, good air entry bilaterally  BREAST:  not examined  GI:  abdomen soft, non-tender, non-distended, normal BS, no bruits, no abdominal masses, no palpable masses  RECTAL:  not examined  :  not examined  MSK:   normal muscle tone, no atrophy, no rigidity, no contractions  EXT:  no clubbing, no cyanosis, no edema, no calf pain, swelling or erythema  VASCULAR:  pulses equal and symmetric in the upper and lower extremities  NEURO:  AAOX3, no focal neurological deficits, follows all commands, able to move extremities spontaneously  SKIN:  no ulcers, lesions or rashes    LABS:                            7.3    1.06  )-----------( 68       ( 09 Apr 2019 05:25 )             21.1     04-09    140  |  111<H>  |  7   ----------------------------<  110<H>  3.1<L>   |  22  |  0.92    Ca    8.2<L>      09 Apr 2019 05:25  Mg     1.3     04-09                                              CULTURES:
INTERVAL HISTORY:    Patient with a history of metastatic non-small cell lung cancer with history of brain metastases, systemic metastases currently cared for at [facility] [place]. According to the wife the patient is on pemetrexed and immunotherapy. Last scans were favorable. He was weakness, fatigue, cytopenia, anemia progressive thrombocytopenia and failure to thrive. History of a diarrhea. Cultures negative. Patient on antibiotic for possible pneumonia    Presently has a progressive thrombocytopenia. Discussed this with the patient and wife and apparently this has occurred before and required platelet transfusions.    REVIEW OF SYSTEMS:      Allergies    penicillin (Unknown)    Intolerances        MEDICATIONS  (STANDING):  atorvastatin 40 milliGRAM(s) Oral at bedtime  benzonatate 100 milliGRAM(s) Oral three times a day  dextrose 5%. 1000 milliLiter(s) (50 mL/Hr) IV Continuous <Continuous>  dextrose 50% Injectable 12.5 Gram(s) IV Push once  dextrose 50% Injectable 25 Gram(s) IV Push once  dextrose 50% Injectable 25 Gram(s) IV Push once  finasteride 5 milliGRAM(s) Oral daily  guaiFENesin ER 1200 milliGRAM(s) Oral every 12 hours  insulin lispro (HumaLOG) corrective regimen sliding scale   SubCutaneous three times a day before meals  insulin lispro (HumaLOG) corrective regimen sliding scale   SubCutaneous at bedtime  lamoTRIgine 100 milliGRAM(s) Oral two times a day  montelukast 10 milliGRAM(s) Oral daily  sodium chloride 0.65% Nasal 1 Spray(s) Both Nostrils every 8 hours  tamsulosin 0.4 milliGRAM(s) Oral at bedtime  tiotropium 18 MICROgram(s) Capsule 1 Capsule(s) Inhalation daily  vancomycin    Solution 125 milliGRAM(s) Oral every 6 hours    MEDICATIONS  (PRN):  acetaminophen   Tablet .. 650 milliGRAM(s) Oral every 6 hours PRN Temp greater or equal to 38C (100.4F), Mild Pain (1 - 3)  ALPRAZolam 0.5 milliGRAM(s) Oral every 8 hours PRN anxiety  dextrose 40% Gel 15 Gram(s) Oral once PRN Blood Glucose LESS THAN 70 milliGRAM(s)/deciliter  diphenhydrAMINE 25 milliGRAM(s) Oral every 6 hours PRN Rash and/or Itching  glucagon  Injectable 1 milliGRAM(s) IntraMuscular once PRN Glucose LESS THAN 70 milligrams/deciliter  haloperidol    Injectable 2 milliGRAM(s) IntraMuscular every 6 hours PRN Agitation  ondansetron Injectable 4 milliGRAM(s) IV Push every 6 hours PRN Nausea      Vital Signs Last 24 Hrs  T(C): 36.9 (12 Apr 2019 17:10), Max: 37.1 (11 Apr 2019 20:06)  T(F): 98.5 (12 Apr 2019 17:10), Max: 98.8 (11 Apr 2019 20:06)  HR: 104 (12 Apr 2019 17:10) (76 - 118)  BP: 134/82 (12 Apr 2019 17:10) (123/84 - 151/92)  BP(mean): --  RR: 18 (12 Apr 2019 17:10) (17 - 18)  SpO2: 95% (12 Apr 2019 17:10) (95% - 100%)    PHYSICAL EXAM:    GENERAL: NAD, Chronically ill appearing  HEAD:  Atraumatic, Normocephalic  EYES: EOMI, PERRLA, conjunctiva and sclera clear    NECK: Supple, No JVD, Normal thyroid  NERVOUS SYSTEM:    CHEST/LUNG: Clear to percussion bilaterally; No rales, rhonchi,   HEART: Regular rate and rhythm;   ABDOMEN: Soft, Nontender.  EXTREMITIES:   edema:-  LYMPH: No lymphadenopathy noted        LABS:                        8.9    14.55 )-----------( 19       ( 12 Apr 2019 05:48 )             25.8     04-11    143  |  113<H>  |  4<L>  ----------------------------<  89  3.5   |  24  |  0.88    Ca    7.9<L>      11 Apr 2019 05:39    TPro  5.0<L>  /  Alb  1.8<L>  /  TBili  0.6  /  DBili  x   /  AST  35  /  ALT  13  /  AlkPhos  91  04-11            RADIOLOGY & ADDITIONAL STUDIES:      < from: CT Chest No Cont (11.20.18 @ 02:58) >    FINDINGS:
Patient is a 64y old  Male who presents with a chief complaint of Fever, weakness (07 Apr 2019 14:48)      Date of service: 04-09-19 @ 09:07      Patient lying in bed; still with cough; nursing notes rash on his back      ROS unable to obtain secondary to patient medical condition     MEDICATIONS  (STANDING):  apixaban 5 milliGRAM(s) Oral every 12 hours  atorvastatin 40 milliGRAM(s) Oral at bedtime  benzonatate 100 milliGRAM(s) Oral three times a day  dextrose 5%. 1000 milliLiter(s) (50 mL/Hr) IV Continuous <Continuous>  dextrose 50% Injectable 12.5 Gram(s) IV Push once  dextrose 50% Injectable 25 Gram(s) IV Push once  dextrose 50% Injectable 25 Gram(s) IV Push once  fentaNYL   Patch  25 MICROgram(s)/Hr 1 Patch Transdermal every 72 hours  finasteride 5 milliGRAM(s) Oral daily  insulin lispro (HumaLOG) corrective regimen sliding scale   SubCutaneous three times a day before meals  insulin lispro (HumaLOG) corrective regimen sliding scale   SubCutaneous at bedtime  lamoTRIgine 100 milliGRAM(s) Oral two times a day  montelukast 10 milliGRAM(s) Oral daily  oseltamivir 75 milliGRAM(s) Oral two times a day  sodium chloride 0.9%. 1000 milliLiter(s) (70 mL/Hr) IV Continuous <Continuous>  tamsulosin 0.4 milliGRAM(s) Oral at bedtime  tiotropium 18 MICROgram(s) Capsule 1 Capsule(s) Inhalation daily  vancomycin    Solution 125 milliGRAM(s) Oral every 6 hours    MEDICATIONS  (PRN):  acetaminophen   Tablet .. 650 milliGRAM(s) Oral every 6 hours PRN Temp greater or equal to 38C (100.4F), Mild Pain (1 - 3)  ALPRAZolam 0.5 milliGRAM(s) Oral every 8 hours PRN anxiety  dextrose 40% Gel 15 Gram(s) Oral once PRN Blood Glucose LESS THAN 70 milliGRAM(s)/deciliter  glucagon  Injectable 1 milliGRAM(s) IntraMuscular once PRN Glucose LESS THAN 70 milligrams/deciliter  haloperidol    Injectable 2 milliGRAM(s) IntraMuscular every 6 hours PRN Agitation  ondansetron Injectable 4 milliGRAM(s) IV Push every 6 hours PRN Nausea      Vital Signs Last 24 Hrs  T(C): 37.2 (09 Apr 2019 07:08), Max: 37.8 (08 Apr 2019 11:56)  T(F): 98.9 (09 Apr 2019 07:08), Max: 100.1 (08 Apr 2019 11:56)  HR: 106 (09 Apr 2019 07:08) (94 - 108)  BP: 118/68 (09 Apr 2019 07:08) (109/67 - 145/87)  BP(mean): --  RR: 17 (09 Apr 2019 07:08) (17 - 18)  SpO2: 100% (09 Apr 2019 07:08) (93% - 100%)    Physical Exam:        PE:    Constitutional: frail looking  HEENT: NC/AT, EOMI, PERRLA, conjunctivae clear; ears and nose atraumatic; pharynx benign  Neck: supple; thyroid not palpable  Back: no tenderness  Respiratory: respiratory effort normal; scattered coarse breath sounds  Cardiovascular: S1S2 regular, no murmurs  Abdomen: soft, not tender, not distended, positive BS; no liver or spleen organomegaly  Genitourinary: no suprapubic tenderness  Musculoskeletal: no muscle tenderness, no joint swelling or tenderness  Extremities: no pedal edema  Neurological/ Psychiatric:  moving all extremities  Skin: difuse maculopapular rash on back    Labs: all available labs reviewed                   Labs:                        7.3    1.06  )-----------( 68       ( 09 Apr 2019 05:25 )             21.1     04-09    140  |  111<H>  |  7   ----------------------------<  110<H>  3.1<L>   |  22  |  0.92    Ca    8.2<L>      09 Apr 2019 05:25             Cultures:       GI PCR Panel, Stool (collected 04-06-19 @ 17:00)  Source: .Stool Feces  Final Report (04-07-19 @ 05:47):    GI PCR Results: NOT detected    *******Please Note:*******    GI panel PCR evaluates for:    Campylobacter, Plesiomonas shigelloides, Salmonella,    Vibrio, Yersinia enterocolitica, Enteroaggregative    Escherichia coli (EAEC), Enteropathogenic E.coli (EPEC),    Enterotoxigenic E. coli (ETEC) lt/st, Shiga-like    toxin-producing E. coli (STEC) stx1/stx2,    Shigella/ Enteroinvasive E. coli (EIEC), Cryptosporidium,    Cyclospora cayetanensis, Entamoeba histolytica,    Giardia lamblia, Adenovirus F 40/41, Astrovirus,    Norovirus GI/GII, Rotavirus A, Sapovirus    Culture - Urine (collected 04-05-19 @ 03:00)  Source: .Urine Clean Catch (Midstream)  Final Report (04-06-19 @ 13:51):    No growth    Culture - Blood (collected 04-04-19 @ 15:05)  Source: .Blood None  Preliminary Report (04-05-19 @ 23:01):    No growth to date.    Culture - Blood (collected 04-04-19 @ 15:05)  Source: .Blood None  Preliminary Report (04-05-19 @ 23:01):    No growth to date.                Clostridium difficile Toxin by PCR (04.06.19 @ 03:00)    C Diff by PCR Result: Detected    Rapid Respiratory Viral Panel (04.04.19 @ 14:37)    Rapid RVP Result: Detected    Influenza AH3 (RapRVP): Detected: spoke to nurse janice artis 04/04/19 16:37        Radiology: all available radiological tests reviewed    < from: Xray Chest 1 View-PORTABLE IMMEDIATE (04.04.19 @ 14:51) >  Stable very small left pleural effusion. No focal consolidation.   Cardiomediastinal silhouette is intact.      < end of copied text >      Advanced directives addressed: full resuscitation
Patient is a 64y old  Male who presents with a chief complaint of Fever, weakness (07 Apr 2019 14:48)    Date of service: 04-10-19 @ 10:41    Patient lying in bed; more awake, diarrhea much improved; breathing better, wants to go home        ROS unable to obtain secondary to patient medical condition     MEDICATIONS  (STANDING):  ALBUTerol/ipratropium for Nebulization 3 milliLiter(s) Nebulizer every 6 hours  apixaban 5 milliGRAM(s) Oral every 12 hours  atorvastatin 40 milliGRAM(s) Oral at bedtime  benzonatate 100 milliGRAM(s) Oral three times a day  dextrose 5%. 1000 milliLiter(s) (50 mL/Hr) IV Continuous <Continuous>  dextrose 50% Injectable 12.5 Gram(s) IV Push once  dextrose 50% Injectable 25 Gram(s) IV Push once  dextrose 50% Injectable 25 Gram(s) IV Push once  fentaNYL   Patch  25 MICROgram(s)/Hr 1 Patch Transdermal every 72 hours  finasteride 5 milliGRAM(s) Oral daily  guaiFENesin ER 1200 milliGRAM(s) Oral every 12 hours  insulin lispro (HumaLOG) corrective regimen sliding scale   SubCutaneous three times a day before meals  insulin lispro (HumaLOG) corrective regimen sliding scale   SubCutaneous at bedtime  lamoTRIgine 100 milliGRAM(s) Oral two times a day  montelukast 10 milliGRAM(s) Oral daily  potassium chloride   Powder 20 milliEquivalent(s) Oral every 2 hours  sodium chloride 0.9%. 1000 milliLiter(s) (70 mL/Hr) IV Continuous <Continuous>  tamsulosin 0.4 milliGRAM(s) Oral at bedtime  tiotropium 18 MICROgram(s) Capsule 1 Capsule(s) Inhalation daily  vancomycin    Solution 125 milliGRAM(s) Oral every 6 hours    MEDICATIONS  (PRN):  acetaminophen   Tablet .. 650 milliGRAM(s) Oral every 6 hours PRN Temp greater or equal to 38C (100.4F), Mild Pain (1 - 3)  ALPRAZolam 0.5 milliGRAM(s) Oral every 8 hours PRN anxiety  dextrose 40% Gel 15 Gram(s) Oral once PRN Blood Glucose LESS THAN 70 milliGRAM(s)/deciliter  diphenhydrAMINE 25 milliGRAM(s) Oral every 6 hours PRN Rash and/or Itching  glucagon  Injectable 1 milliGRAM(s) IntraMuscular once PRN Glucose LESS THAN 70 milligrams/deciliter  haloperidol    Injectable 2 milliGRAM(s) IntraMuscular every 6 hours PRN Agitation  ondansetron Injectable 4 milliGRAM(s) IV Push every 6 hours PRN Nausea      Vital Signs Last 24 Hrs  T(C): 36.5 (10 Apr 2019 07:22), Max: 36.7 (10 Apr 2019 00:25)  T(F): 97.7 (10 Apr 2019 07:22), Max: 98 (10 Apr 2019 00:25)  HR: 109 (10 Apr 2019 07:22) (95 - 109)  BP: 136/81 (10 Apr 2019 07:22) (124/76 - 142/81)  BP(mean): --  RR: 18 (10 Apr 2019 07:22) (18 - 20)  SpO2: 93% (10 Apr 2019 07:22) (92% - 97%)    Physical Exam:      PE:    Constitutional: frail looking  HEENT: NC/AT, EOMI, PERRLA, conjunctivae clear; ears and nose atraumatic; pharynx benign  Neck: supple; thyroid not palpable  Back: no tenderness  Respiratory: respiratory effort normal; scattered coarse breath sounds  Cardiovascular: S1S2 regular, no murmurs  Abdomen: soft, not tender, not distended, positive BS; no liver or spleen organomegaly  Genitourinary: no suprapubic tenderness  Musculoskeletal: no muscle tenderness, no joint swelling or tenderness  Extremities: no pedal edema  Neurological/ Psychiatric:  moving all extremities  Skin: difuse maculopapular rash on back    Labs: all available labs reviewed                   Labs:                        7.3    1.06  )-----------( 68       ( 09 Apr 2019 05:25 )             21.1     04-09    140  |  111<H>  |  7   ----------------------------<  110<H>  3.1<L>   |  22  |  0.92    Ca    8.2<L>      09 Apr 2019 05:25             Cultures:       GI PCR Panel, Stool (collected 04-06-19 @ 17:00)  Source: .Stool Feces  Final Report (04-07-19 @ 05:47):    GI PCR Results: NOT detected    *******Please Note:*******    GI panel PCR evaluates for:    Campylobacter, Plesiomonas shigelloides, Salmonella,    Vibrio, Yersinia enterocolitica, Enteroaggregative    Escherichia coli (EAEC), Enteropathogenic E.coli (EPEC),    Enterotoxigenic E. coli (ETEC) lt/st, Shiga-like    toxin-producing E. coli (STEC) stx1/stx2,    Shigella/ Enteroinvasive E. coli (EIEC), Cryptosporidium,    Cyclospora cayetanensis, Entamoeba histolytica,    Giardia lamblia, Adenovirus F 40/41, Astrovirus,    Norovirus GI/GII, Rotavirus A, Sapovirus    Culture - Urine (collected 04-05-19 @ 03:00)  Source: .Urine Clean Catch (Midstream)  Final Report (04-06-19 @ 13:51):    No growth    Culture - Blood (collected 04-04-19 @ 15:05)  Source: .Blood None  Preliminary Report (04-05-19 @ 23:01):    No growth to date.    Culture - Blood (collected 04-04-19 @ 15:05)  Source: .Blood None  Preliminary Report (04-05-19 @ 23:01):    No growth to date.                Clostridium difficile Toxin by PCR (04.06.19 @ 03:00)    C Diff by PCR Result: Detected    Rapid Respiratory Viral Panel (04.04.19 @ 14:37)    Rapid RVP Result: Detected    Influenza AH3 (RapRVP): Detected: spoke to nurse janice artis 04/04/19 16:37        Radiology: all available radiological tests reviewed    < from: Xray Chest 1 View-PORTABLE IMMEDIATE (04.04.19 @ 14:51) >  Stable very small left pleural effusion. No focal consolidation.   Cardiomediastinal silhouette is intact.      < end of copied text >      Advanced directives addressed: full resuscitation

## 2019-04-14 NOTE — PROGRESS NOTE ADULT - ASSESSMENT
· Assessment		  64yoM hx of metastatic lung cancer mets to brain and bone, S/P whole brain radiation currently on maintenance chemotherapy, H/O PEs on eliquis, pw 1 day of weakness with altered mental status. Per wife, patient seemed confused, not answering questions appropriately today. Weakness has been progressive worse today, associated with decreased food and water intake. + sick contacts at home with the flu. He was recently admitted with pneumonia. He denies any nausea, vomiting, abd pain, diarrhea or chest pain. He was found to have fever of 102F in ED. He received IV vancomycin and azactam in ED.     # Acute febrile illness due to influenza  # Cdiff colitis in an immunocompromised patient  # Acute anemia due to chemotherapy  # pancytopenia due to chemotherapy  # Neutropenia on Nuepogen  # thrombocytopenia   # Lung CA with mets to brain  # Hypokalemia  # Hypomagnesemia  # Hx of PE on eliquis  # DM  # COPD  # Urinary retention      Plan:   - Follow blood cultures: no growth, completed tamiflu, po vanco duration per ID  - was taking po prednisone at home, ? if for brain mets, restarted 4/13/19, increased vanco dose to 500 Q 6 hours due to prednisone and poor response/improvement  - CT of the chest did not show any signs of PNA  - CT of the head noted  - Lactic acid is normal  - BNP noted but no signs of effusions on CT of the chest, continue IVF, encourage po hydration and diet  - continue nebs and mucinex  - benadryl for contact dermatitis  - DC neupogen since WBC have normalized  - oncology eval for platelet transfusion today  - 1 unit PRBC during admission  - Continue flomax and start proscar  - hold eliquis due to thrombocytopenia        Likely DC to [facility] once bowel movements improve/decrease

## 2019-04-14 NOTE — DISCHARGE NOTE PROVIDER - NSDCCPCAREPLAN_GEN_ALL_CORE_FT
PRINCIPAL DISCHARGE DIAGNOSIS  Diagnosis: Sepsis  Assessment and Plan of Treatment:       SECONDARY DISCHARGE DIAGNOSES  Diagnosis: Altered mental status  Assessment and Plan of Treatment:

## 2019-04-15 ENCOUNTER — TRANSCRIPTION ENCOUNTER (OUTPATIENT)
Age: 64
End: 2019-04-15

## 2019-04-15 VITALS
RESPIRATION RATE: 17 BRPM | OXYGEN SATURATION: 98 % | HEART RATE: 95 BPM | SYSTOLIC BLOOD PRESSURE: 146 MMHG | DIASTOLIC BLOOD PRESSURE: 85 MMHG | TEMPERATURE: 98 F

## 2019-04-15 LAB
ANION GAP SERPL CALC-SCNC: 7 MMOL/L — SIGNIFICANT CHANGE UP (ref 5–17)
BLD GP AB SCN SERPL QL: SIGNIFICANT CHANGE UP
BUN SERPL-MCNC: 6 MG/DL — LOW (ref 7–23)
CALCIUM SERPL-MCNC: 8 MG/DL — LOW (ref 8.5–10.1)
CHLORIDE SERPL-SCNC: 113 MMOL/L — HIGH (ref 96–108)
CO2 SERPL-SCNC: 23 MMOL/L — SIGNIFICANT CHANGE UP (ref 22–31)
CREAT SERPL-MCNC: 0.87 MG/DL — SIGNIFICANT CHANGE UP (ref 0.5–1.3)
GLUCOSE SERPL-MCNC: 77 MG/DL — SIGNIFICANT CHANGE UP (ref 70–99)
HCT VFR BLD CALC: 20.1 % — CRITICAL LOW (ref 39–50)
HCT VFR BLD CALC: 21.5 % — LOW (ref 39–50)
HGB BLD-MCNC: 7 G/DL — CRITICAL LOW (ref 13–17)
HGB BLD-MCNC: 7.5 G/DL — LOW (ref 13–17)
MCHC RBC-ENTMCNC: 32.7 PG — SIGNIFICANT CHANGE UP (ref 27–34)
MCHC RBC-ENTMCNC: 34.8 GM/DL — SIGNIFICANT CHANGE UP (ref 32–36)
MCV RBC AUTO: 93.9 FL — SIGNIFICANT CHANGE UP (ref 80–100)
NRBC # BLD: 0 /100 WBCS — SIGNIFICANT CHANGE UP (ref 0–0)
PLATELET # BLD AUTO: 50 K/UL — LOW (ref 150–400)
POTASSIUM SERPL-MCNC: 3.3 MMOL/L — LOW (ref 3.5–5.3)
POTASSIUM SERPL-SCNC: 3.3 MMOL/L — LOW (ref 3.5–5.3)
RBC # BLD: 2.14 M/UL — LOW (ref 4.2–5.8)
RBC # FLD: 15.9 % — HIGH (ref 10.3–14.5)
SODIUM SERPL-SCNC: 143 MMOL/L — SIGNIFICANT CHANGE UP (ref 135–145)
TYPE + AB SCN PNL BLD: SIGNIFICANT CHANGE UP
WBC # BLD: 7.35 K/UL — SIGNIFICANT CHANGE UP (ref 3.8–10.5)
WBC # FLD AUTO: 7.35 K/UL — SIGNIFICANT CHANGE UP (ref 3.8–10.5)

## 2019-04-15 RX ORDER — FINASTERIDE 5 MG/1
1 TABLET, FILM COATED ORAL
Qty: 0 | Refills: 0 | COMMUNITY
Start: 2019-04-15

## 2019-04-15 RX ORDER — ZOLPIDEM TARTRATE 10 MG/1
1 TABLET ORAL
Qty: 0 | Refills: 0 | COMMUNITY

## 2019-04-15 RX ORDER — APIXABAN 2.5 MG/1
1 TABLET, FILM COATED ORAL
Qty: 0 | Refills: 0 | COMMUNITY

## 2019-04-15 RX ORDER — VANCOMYCIN HCL 1 G
2 VIAL (EA) INTRAVENOUS
Qty: 0 | Refills: 0 | COMMUNITY
Start: 2019-04-15

## 2019-04-15 RX ORDER — QUETIAPINE FUMARATE 200 MG/1
1 TABLET, FILM COATED ORAL
Qty: 0 | Refills: 0 | COMMUNITY
Start: 2019-04-15

## 2019-04-15 RX ORDER — LAMOTRIGINE 25 MG/1
1 TABLET, ORALLY DISINTEGRATING ORAL
Qty: 0 | Refills: 0 | COMMUNITY

## 2019-04-15 RX ORDER — OXYCODONE HYDROCHLORIDE 5 MG/1
1 TABLET ORAL
Qty: 0 | Refills: 0 | COMMUNITY

## 2019-04-15 RX ORDER — QUETIAPINE FUMARATE 200 MG/1
25 TABLET, FILM COATED ORAL AT BEDTIME
Qty: 0 | Refills: 0 | Status: DISCONTINUED | OUTPATIENT
Start: 2019-04-15 | End: 2019-04-15

## 2019-04-15 RX ADMIN — Medication 1 DROP(S): at 12:35

## 2019-04-15 RX ADMIN — ZOLPIDEM TARTRATE 5 MILLIGRAM(S): 10 TABLET ORAL at 00:03

## 2019-04-15 RX ADMIN — Medication 100 MILLIGRAM(S): at 15:20

## 2019-04-15 RX ADMIN — Medication 1200 MILLIGRAM(S): at 05:24

## 2019-04-15 RX ADMIN — Medication 1 DROP(S): at 00:04

## 2019-04-15 RX ADMIN — FAMOTIDINE 20 MILLIGRAM(S): 10 INJECTION INTRAVENOUS at 05:24

## 2019-04-15 RX ADMIN — Medication 100 MILLIGRAM(S): at 05:24

## 2019-04-15 RX ADMIN — Medication 10 MILLIGRAM(S): at 05:24

## 2019-04-15 RX ADMIN — Medication 3 MILLILITER(S): at 11:34

## 2019-04-15 RX ADMIN — Medication 1 DROP(S): at 16:57

## 2019-04-15 RX ADMIN — FENTANYL CITRATE 1 PATCH: 50 INJECTION INTRAVENOUS at 10:15

## 2019-04-15 RX ADMIN — FINASTERIDE 5 MILLIGRAM(S): 5 TABLET, FILM COATED ORAL at 12:35

## 2019-04-15 RX ADMIN — Medication 500 MILLIGRAM(S): at 05:25

## 2019-04-15 RX ADMIN — Medication 1 MILLIGRAM(S): at 12:35

## 2019-04-15 RX ADMIN — Medication 500 MILLIGRAM(S): at 12:35

## 2019-04-15 RX ADMIN — Medication 1 DROP(S): at 05:25

## 2019-04-15 NOTE — DISCHARGE NOTE NURSING/CASE MANAGEMENT/SOCIAL WORK - NSDCDPATPORTLINK_GEN_ALL_CORE
You can access the [facility] Patient Portal, offered by [facility], by registering with the following website: [email or web address]

## 2019-04-15 NOTE — PROVIDER CONTACT NOTE (CRITICAL VALUE NOTIFICATION) - ACTION/TREATMENT ORDERED:
MD called and made aware. No new orders. Will continue to monitor pt.
No orders at this time, to be followed by day team
vancomycin PO was ordered by Dr Prado

## 2019-04-19 DIAGNOSIS — E87.6 HYPOKALEMIA: ICD-10-CM

## 2019-04-19 DIAGNOSIS — E43 UNSPECIFIED SEVERE PROTEIN-CALORIE MALNUTRITION: ICD-10-CM

## 2019-04-19 DIAGNOSIS — E83.42 HYPOMAGNESEMIA: ICD-10-CM

## 2019-04-19 DIAGNOSIS — Z92.3 PERSONAL HISTORY OF IRRADIATION: ICD-10-CM

## 2019-04-19 DIAGNOSIS — Z66 DO NOT RESUSCITATE: ICD-10-CM

## 2019-04-19 DIAGNOSIS — Z90.49 ACQUIRED ABSENCE OF OTHER SPECIFIED PARTS OF DIGESTIVE TRACT: ICD-10-CM

## 2019-04-19 DIAGNOSIS — D61.810 ANTINEOPLASTIC CHEMOTHERAPY INDUCED PANCYTOPENIA: ICD-10-CM

## 2019-04-19 DIAGNOSIS — J91.8 PLEURAL EFFUSION IN OTHER CONDITIONS CLASSIFIED ELSEWHERE: ICD-10-CM

## 2019-04-19 DIAGNOSIS — G47.00 INSOMNIA, UNSPECIFIED: ICD-10-CM

## 2019-04-19 DIAGNOSIS — C79.51 SECONDARY MALIGNANT NEOPLASM OF BONE: ICD-10-CM

## 2019-04-19 DIAGNOSIS — Z79.84 LONG TERM (CURRENT) USE OF ORAL HYPOGLYCEMIC DRUGS: ICD-10-CM

## 2019-04-19 DIAGNOSIS — F05 DELIRIUM DUE TO KNOWN PHYSIOLOGICAL CONDITION: ICD-10-CM

## 2019-04-19 DIAGNOSIS — Z87.891 PERSONAL HISTORY OF NICOTINE DEPENDENCE: ICD-10-CM

## 2019-04-19 DIAGNOSIS — C79.31 SECONDARY MALIGNANT NEOPLASM OF BRAIN: ICD-10-CM

## 2019-04-19 DIAGNOSIS — Z92.21 PERSONAL HISTORY OF ANTINEOPLASTIC CHEMOTHERAPY: ICD-10-CM

## 2019-04-19 DIAGNOSIS — Z88.0 ALLERGY STATUS TO PENICILLIN: ICD-10-CM

## 2019-04-19 DIAGNOSIS — R50.9 FEVER, UNSPECIFIED: ICD-10-CM

## 2019-04-19 DIAGNOSIS — J10.1 INFLUENZA DUE TO OTHER IDENTIFIED INFLUENZA VIRUS WITH OTHER RESPIRATORY MANIFESTATIONS: ICD-10-CM

## 2019-04-19 DIAGNOSIS — R62.7 ADULT FAILURE TO THRIVE: ICD-10-CM

## 2019-04-19 DIAGNOSIS — Z79.01 LONG TERM (CURRENT) USE OF ANTICOAGULANTS: ICD-10-CM

## 2019-04-19 DIAGNOSIS — Z86.711 PERSONAL HISTORY OF PULMONARY EMBOLISM: ICD-10-CM

## 2019-04-19 DIAGNOSIS — C34.90 MALIGNANT NEOPLASM OF UNSPECIFIED PART OF UNSPECIFIED BRONCHUS OR LUNG: ICD-10-CM

## 2019-04-19 DIAGNOSIS — E11.9 TYPE 2 DIABETES MELLITUS WITHOUT COMPLICATIONS: ICD-10-CM

## 2019-04-19 DIAGNOSIS — A04.72 ENTEROCOLITIS DUE TO CLOSTRIDIUM DIFFICILE, NOT SPECIFIED AS RECURRENT: ICD-10-CM

## 2019-04-19 DIAGNOSIS — J44.9 CHRONIC OBSTRUCTIVE PULMONARY DISEASE, UNSPECIFIED: ICD-10-CM

## 2019-04-19 DIAGNOSIS — A41.9 SEPSIS, UNSPECIFIED ORGANISM: ICD-10-CM

## 2019-04-19 DIAGNOSIS — R33.9 RETENTION OF URINE, UNSPECIFIED: ICD-10-CM

## 2019-04-19 DIAGNOSIS — R21 RASH AND OTHER NONSPECIFIC SKIN ERUPTION: ICD-10-CM

## 2019-04-19 DIAGNOSIS — E86.0 DEHYDRATION: ICD-10-CM

## 2019-04-19 DIAGNOSIS — F31.30 BIPOLAR DISORDER, CURRENT EPISODE DEPRESSED, MILD OR MODERATE SEVERITY, UNSPECIFIED: ICD-10-CM

## 2019-06-12 ENCOUNTER — EMERGENCY (EMERGENCY)
Facility: HOSPITAL | Age: 64
LOS: 0 days | Discharge: HOME | End: 2019-06-12
Attending: EMERGENCY MEDICINE | Admitting: EMERGENCY MEDICINE
Payer: MEDICARE

## 2019-06-12 VITALS
RESPIRATION RATE: 18 BRPM | SYSTOLIC BLOOD PRESSURE: 136 MMHG | TEMPERATURE: 98 F | DIASTOLIC BLOOD PRESSURE: 88 MMHG | HEART RATE: 67 BPM | OXYGEN SATURATION: 100 %

## 2019-06-12 VITALS — WEIGHT: 160.06 LBS

## 2019-06-12 DIAGNOSIS — J44.9 CHRONIC OBSTRUCTIVE PULMONARY DISEASE, UNSPECIFIED: ICD-10-CM

## 2019-06-12 DIAGNOSIS — Z85.118 PERSONAL HISTORY OF OTHER MALIGNANT NEOPLASM OF BRONCHUS AND LUNG: ICD-10-CM

## 2019-06-12 DIAGNOSIS — R07.89 OTHER CHEST PAIN: ICD-10-CM

## 2019-06-12 DIAGNOSIS — N04.9 NEPHROTIC SYNDROME WITH UNSPECIFIED MORPHOLOGIC CHANGES: ICD-10-CM

## 2019-06-12 DIAGNOSIS — Z87.891 PERSONAL HISTORY OF NICOTINE DEPENDENCE: ICD-10-CM

## 2019-06-12 DIAGNOSIS — Z88.0 ALLERGY STATUS TO PENICILLIN: ICD-10-CM

## 2019-06-12 DIAGNOSIS — Z79.01 LONG TERM (CURRENT) USE OF ANTICOAGULANTS: ICD-10-CM

## 2019-06-12 DIAGNOSIS — Z98.1 ARTHRODESIS STATUS: Chronic | ICD-10-CM

## 2019-06-12 DIAGNOSIS — Z90.49 ACQUIRED ABSENCE OF OTHER SPECIFIED PARTS OF DIGESTIVE TRACT: Chronic | ICD-10-CM

## 2019-06-12 PROBLEM — C34.90 MALIGNANT NEOPLASM OF UNSPECIFIED PART OF UNSPECIFIED BRONCHUS OR LUNG: Chronic | Status: ACTIVE | Noted: 2019-04-04

## 2019-06-12 LAB
ALBUMIN SERPL ELPH-MCNC: 3 G/DL — LOW (ref 3.3–5)
ALP SERPL-CCNC: 140 U/L — HIGH (ref 40–120)
ALT FLD-CCNC: 19 U/L — SIGNIFICANT CHANGE UP (ref 12–78)
ANION GAP SERPL CALC-SCNC: 11 MMOL/L — SIGNIFICANT CHANGE UP (ref 5–17)
AST SERPL-CCNC: 30 U/L — SIGNIFICANT CHANGE UP (ref 15–37)
BILIRUB SERPL-MCNC: 0.2 MG/DL — SIGNIFICANT CHANGE UP (ref 0.2–1.2)
BUN SERPL-MCNC: 24 MG/DL — HIGH (ref 7–23)
CALCIUM SERPL-MCNC: 9.5 MG/DL — SIGNIFICANT CHANGE UP (ref 8.5–10.1)
CHLORIDE SERPL-SCNC: 102 MMOL/L — SIGNIFICANT CHANGE UP (ref 96–108)
CO2 SERPL-SCNC: 23 MMOL/L — SIGNIFICANT CHANGE UP (ref 22–31)
CREAT SERPL-MCNC: 1.53 MG/DL — HIGH (ref 0.5–1.3)
GLUCOSE SERPL-MCNC: 169 MG/DL — HIGH (ref 70–99)
HCT VFR BLD CALC: 36.2 % — LOW (ref 39–50)
HGB BLD-MCNC: 12.2 G/DL — LOW (ref 13–17)
LIDOCAIN IGE QN: 263 U/L — SIGNIFICANT CHANGE UP (ref 73–393)
MAGNESIUM SERPL-MCNC: 1.8 MG/DL — SIGNIFICANT CHANGE UP (ref 1.6–2.6)
MCHC RBC-ENTMCNC: 31.7 PG — SIGNIFICANT CHANGE UP (ref 27–34)
MCHC RBC-ENTMCNC: 33.7 GM/DL — SIGNIFICANT CHANGE UP (ref 32–36)
MCV RBC AUTO: 94 FL — SIGNIFICANT CHANGE UP (ref 80–100)
PLATELET # BLD AUTO: 255 K/UL — SIGNIFICANT CHANGE UP (ref 150–400)
POTASSIUM SERPL-MCNC: 4 MMOL/L — SIGNIFICANT CHANGE UP (ref 3.5–5.3)
POTASSIUM SERPL-SCNC: 4 MMOL/L — SIGNIFICANT CHANGE UP (ref 3.5–5.3)
PROT SERPL-MCNC: 7.3 GM/DL — SIGNIFICANT CHANGE UP (ref 6–8.3)
RBC # BLD: 3.85 M/UL — LOW (ref 4.2–5.8)
RBC # FLD: 13.9 % — SIGNIFICANT CHANGE UP (ref 10.3–14.5)
SODIUM SERPL-SCNC: 136 MMOL/L — SIGNIFICANT CHANGE UP (ref 135–145)
TROPONIN I SERPL-MCNC: <0.015 NG/ML — SIGNIFICANT CHANGE UP (ref 0.01–0.04)
TROPONIN I SERPL-MCNC: <0.015 NG/ML — SIGNIFICANT CHANGE UP (ref 0.01–0.04)
WBC # BLD: 12.58 K/UL — HIGH (ref 3.8–10.5)
WBC # FLD AUTO: 12.58 K/UL — HIGH (ref 3.8–10.5)

## 2019-06-12 PROCEDURE — 93010 ELECTROCARDIOGRAM REPORT: CPT

## 2019-06-12 PROCEDURE — 99284 EMERGENCY DEPT VISIT MOD MDM: CPT

## 2019-06-12 PROCEDURE — 71045 X-RAY EXAM CHEST 1 VIEW: CPT | Mod: 26

## 2019-06-12 RX ORDER — SODIUM CHLORIDE 9 MG/ML
500 INJECTION, SOLUTION INTRAVENOUS ONCE
Refills: 0 | Status: COMPLETED | OUTPATIENT
Start: 2019-06-12 | End: 2019-06-12

## 2019-06-12 RX ORDER — SODIUM CHLORIDE 9 MG/ML
500 INJECTION INTRAMUSCULAR; INTRAVENOUS; SUBCUTANEOUS ONCE
Refills: 0 | Status: COMPLETED | OUTPATIENT
Start: 2019-06-12 | End: 2019-06-12

## 2019-06-12 RX ADMIN — SODIUM CHLORIDE 500 MILLILITER(S): 9 INJECTION INTRAMUSCULAR; INTRAVENOUS; SUBCUTANEOUS at 16:08

## 2019-06-12 RX ADMIN — SODIUM CHLORIDE 500 MILLILITER(S): 9 INJECTION, SOLUTION INTRAVENOUS at 17:52

## 2019-06-12 RX ADMIN — SODIUM CHLORIDE 500 MILLILITER(S): 9 INJECTION INTRAMUSCULAR; INTRAVENOUS; SUBCUTANEOUS at 17:00

## 2019-06-12 NOTE — ED PROVIDER NOTE - CONSTITUTIONAL, MLM
normal... Awake, alert, oriented to person, place, time/situation and in no apparent distress. +appears dehydrated,

## 2019-06-12 NOTE — ED PROVIDER NOTE - OBJECTIVE STATEMENT
63 y/o male with a PMHx of stage 4 lung CA, COPD, nephrotic syndrome, h/o of cholecystectomy, and spinal fusion presents to ED c/o chest pain. Pt reports pressured chest pain. Chest pain is reported to have never been felt before. Pt also reports dizziness that he has "had for quite a while". Also reports near syncope and diarrhea. Pt denies V/N.  Non smoker, no ETOH or illicit drug use. Pt takes Eliquis. On Keytruda every 3 weeks. Last chemotherapy was 2 months ago.

## 2019-06-12 NOTE — ED ADULT TRIAGE NOTE - CHIEF COMPLAINT QUOTE
pt presents to ED due to chest pain chest pressure since this AM pt has stage IV lung ca on immunotherapy

## 2019-06-12 NOTE — ED ADULT NURSE NOTE - OBJECTIVE STATEMENT
Patient comes to ED for chest pain for about 5 days. pt reports SOb on exertion. pt has a hx of stage 4 lung ca, on immunotherapy. pt also has a hx of COPD. pt was sent in by MD to R/O cardiac etiology.

## 2019-06-12 NOTE — ED PROVIDER NOTE - PROGRESS NOTE DETAILS
Adam MILLER for ED attending, Dr. Cavanaugh: Pt has elevated creatinine of 1.53 and elevated BUN of 24. WBC slightly elevated but pt has had this range before. Will hydrate pt. I Chelsea Christianson attest that this documentation has been prepared under the direction and in the presence of Doctor Cavanaugh. Adam MILLER for ED attending, Dr. Cavanaugh: new troponin at 1830.

## 2019-06-12 NOTE — ED STATDOCS - PROGRESS NOTE DETAILS
Adam NP for Dr. Arias: 65 y/o male with a PMHx of COPD, Lung CA, Nephrotic syndrome presents to the ED c/o chest pain x5 days. Chest pain is non exertional. Denies SOB. Pt was sent by oncology. Will send pt to main ED for further evaluation.

## 2021-06-28 NOTE — ED PROVIDER NOTE - CPE EDP CARDIAC NORM
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 Right lower leg  Eczema Psoriasis Lupus   NO NO NO     Family Medical History  Skin cancer: oldest brother - melanoma  Eczema Psoriasis Lupus   NO NO NO     Sun Protection : SPF       Occupation: retired  Refer to electronic medical record (EMR) for past medical history and medications.    ROS: 14 point review of systems was negative except the symptoms listed above in the HPI.    OBJECTIVE:   GENERAL: healthy, alert and no distress.  HEENT: PERRL. Conjunctiva, sclera clear.  SKIN: Christopher Skin Type - II.  This patient was examined from the top of the head to the bottom of the feet  including scalp, face, neck, trunk,, both arms, , both hands, and all fingers and toes. The dermatoscope was used to help evaluate pigmented lesions.  Skin Pertinent Findings:    Mid chest- well healed 5 mm shave excision site         MDM : discussed topical treatment with 5 fluorouracil , excision. Discussed 5 year recurrence rate with both procedures. He is comfortable with topical treatment with 5 fluorouracil , which I think is a reasonable decision.             ASSESSMENT:     Encounter Diagnosis   Name Primary?     Squamous cell carcinoma in situ Yes     MDM: recommended yearly skin exam because of family history of melanoma and his history of squamous cell carcinoma     PLAN:   Patient Instructions   TOPICAL MEDICATION  Apply fluorouracil (Efudex) 5% cream mixed with  calcipotriene cream 0.005% - apply two times per day for 3 weeks . May apply aquaphor comfort.    Apply 1 inch past the borders of each site.    Use a Q tip for application    Make sure to wash your hands after application.    The medication is used to treat abnormal cells, so you will develop irritation at the affected areas. It reacts with abnormal skin cells but avoids normal skin cells.    However, stop applying the cream if the area becomes excessively irritated (i.e. painful or weepy).    If you notice excessive irritation, call us at [phone] and ask to have me or my colleague paged. We will call you back to discuss.    Consider application of Vaseline only for skin irritation when applying this medication.      The patient was counseled about sunscreens and sun avoidance. The patient was counseled to check the skin regularly and report any lesion that is new, changing, itching, scabbing, bleeding or otherwise bothersome. The patient was discharged ambulatory and in stable condition.       normal...

## 2021-06-28 NOTE — ED ADULT NURSE NOTE - NS ED NURSE LEVEL OF CONSCIOUSNESS AFFECT
Result Notes   Korina Hobbs, Agitated

## 2021-07-05 NOTE — H&P ADULT - NSHPSOURCEINFORD_GEN_ALL_CORE
Thyroid Biopsy Discharge Instructions    You had a procedure called fine-needle thyroid biopsy. This biopsy was done to assess a nodule or cyst in your thyroid gland or enlargement of the thyroid. During the biopsy, a very thin needle is inserted through the skin into the gland. The needle is used to remove a small amount of tissue from the gland. (This may be done more than one time to be sure to get cells from all parts of the nodule.) Or the needle is used to drain fluid from a cyst. The tissue or fluid is then studied in a lab.       Home care  Tips to take care of yourself at home:   · You will have a small adhesive bandage on your biopsy site. Leave the bandage in place for 4 to 6 hours. After this time, you don't need to keep it covered.   · If you feel discomfort after the biopsy, take over-the-counter pain medicine. Do not take aspirin.  · Ask your healthcare provider when you can return to work and normal activities. This will likely be the same day as your procedure.  · You may develop bruises or swelling at the site where the radiologist placed the biopsy needle. This is normal and nothing to worry about. The bruises should start to fade in a week to ten days.      Local Anesthetic   You were given local anesthesia to numb the area for the procedure and may experience pain to the site in a couple of hours when the anesthetic wears off.    Getting your results  Your biopsy results may take a few days. When the results are ready, your healthcare provider will discuss them with you and tell you what, if anything, needs to be done next.       Follow-up  Make a follow-up appointment as directed by your healthcare provider.      When to call your healthcare provider  Call your healthcare provider right away if you have any of the following:  · Bleeding that won’t stop  · Shortness of breath or trouble breathing  · Fever of 100.4°F (38°C) or higher  · Increasing pain, redness, tenderness, or drainage at the  biopsy site  · Swelling of the biopsy site            Spouse/Significant Other/Chart(s)/Patient

## 2023-11-23 NOTE — PATIENT PROFILE ADULT - NSPROGENANESREACTION_GEN_A_NUR
Seroquel 100mg p.o. at bedtime  Haoldol 5 mg PO/IM/IV q 6Hrs prn agitation if Qtc < 500  Obtain EKG for Qtc monitoring  no previous reaction Seroquel 100mg p.o. at bedtime  Haldol 5 mg PO/IM/IV q 6Hrs prn agitation if Qtc < 500  Obtain EKG for Qtc monitoring

## 2024-04-30 NOTE — H&P ADULT - CARDIOVASCULAR
Body Location Override (Optional - Billing Will Still Be Based On Selected Body Map Location If Applicable): left medial thigh Detail Level: Detailed Depth Of Biopsy: dermis Was A Bandage Applied: Yes Size Of Lesion In Cm: 0.3 X Size Of Lesion In Cm: 0.2 detailed exam Biopsy Type: H and E Biopsy Method: Dermablade Anesthesia Type: 1% lidocaine with epinephrine Anesthesia Volume In Cc: 0.5 Additional Anesthesia Volume In Cc (Will Not Render If 0): 0 Hemostasis: Aluminum Chloride Wound Care: Petrolatum Dressing: bandage Destruction After The Procedure: No Type Of Destruction Used: Curettage Curettage Text: The wound bed was treated with curettage after the biopsy was performed. Cryotherapy Text: The wound bed was treated with cryotherapy after the biopsy was performed. Electrodesiccation Text: The wound bed was treated with electrodesiccation after the biopsy was performed. Electrodesiccation And Curettage Text: The wound bed was treated with electrodesiccation and curettage after the biopsy was performed. Silver Nitrate Text: The wound bed was treated with silver nitrate after the biopsy was performed. Lab: -[id number] Consent: Written consent was obtained and risks were reviewed including but not limited to scarring, infection, bleeding, scabbing, incomplete removal, nerve damage and allergy to anesthesia. Post-Care Instructions: I reviewed with the patient in detail post-care instructions. Patient is to keep the biopsy site dry overnight, and then apply bacitracin twice daily until healed. Patient may apply hydrogen peroxide soaks to remove any crusting. Notification Instructions: Patient will be notified of biopsy results. However, patient instructed to call the office if not contacted within 2 weeks. Billing Type: Third-Party Bill Information: Selecting Yes will display possible errors in your note based on the variables you have selected. This validation is only offered as a suggestion for you. PLEASE NOTE THAT THE VALIDATION TEXT WILL BE REMOVED WHEN YOU FINALIZE YOUR NOTE. IF YOU WANT TO FAX A PRELIMINARY NOTE YOU WILL NEED TO TOGGLE THIS TO 'NO' IF YOU DO NOT WANT IT IN YOUR FAXED NOTE. details…

## 2024-09-04 NOTE — DISCHARGE NOTE ADULT - CAREGIVER RELATION TO PATIENT
Skilled Needs: Education and Wound Care   Caregiver involvement: Pt independent with care with the exception of wound treatment due to location   Medications reviewed and all medications are available in the home this visit.    The following education was provided regarding medications:  HERMAN  MD notified of any discrepancies/look a-like medications/medication interactions: HERMAN  Medications are effecrtive at this time.    Home health supplies by type and quantity ordered/delivered this visit include:  Supplies available   Patient education provided this visit:  Reviewed to reported any redness, swelling, warmth, fever, chills, increased heart/respiratory rate, uncontrolled pain/tenderness, drainage:green/yellow/brown, or odor to MD immediately. Wound care completed images and measurements taken, No odor this visit. Pt monitors glucose levels daily   Sharps education provided: Clinician instructed patient/CG on proper disposal of sharps: Containers should be made of hard plastic, be puncture-resistant and leakproof, such as a laundry detergent or bleach bottle.  When the container is ¾ full, it should be sealed with tape and labeled DO NOT RECYCLE prior to discarding in the regular trash.    Patient level of understanding of education provided: Verbalzied all understanding to above education  Skilled Care Performed this visit: Education and Wound Care  Patient response to procedure performed: Minimal pain during dressing change   Agency Progress toward goals: Progressing toward interventions above  Patient's Progress towards personal goals: when patient reaches goals and medication is managed, and disease processes are understood patient agrees and understand that discharge will take place
wife

## 2025-02-18 NOTE — ED PROVIDER NOTE - EYES, MLM
What Is The Reason For Today's Visit?: Full Body Skin Examination What Is The Reason For Today's Visit? (Being Monitored For X): concerning skin lesions on a periodic basis How Severe Are Your Spot(S)?: mild Clear bilaterally, pupils equal, round and reactive to light.

## 2025-07-18 NOTE — ED PROVIDER NOTE - NS ED MD DISPO DISCHARGE CCDA
Patient/Caregiver provided printed discharge information.
left knee pain  Pt's goal is to be pain free and walk better.